# Patient Record
Sex: FEMALE | Race: WHITE | Employment: UNEMPLOYED | URBAN - METROPOLITAN AREA
[De-identification: names, ages, dates, MRNs, and addresses within clinical notes are randomized per-mention and may not be internally consistent; named-entity substitution may affect disease eponyms.]

---

## 2017-11-30 ENCOUNTER — OFFICE VISIT (OUTPATIENT)
Dept: FAMILY MEDICINE CLINIC | Age: 22
End: 2017-11-30

## 2017-11-30 VITALS
SYSTOLIC BLOOD PRESSURE: 128 MMHG | RESPIRATION RATE: 18 BRPM | TEMPERATURE: 98.4 F | HEIGHT: 64 IN | DIASTOLIC BLOOD PRESSURE: 84 MMHG | BODY MASS INDEX: 22.98 KG/M2 | OXYGEN SATURATION: 100 % | WEIGHT: 134.6 LBS | HEART RATE: 94 BPM

## 2017-11-30 DIAGNOSIS — F19.11 HISTORY OF SUBSTANCE ABUSE (HCC): ICD-10-CM

## 2017-11-30 DIAGNOSIS — N92.6 MISSED MENSES: Primary | ICD-10-CM

## 2017-11-30 DIAGNOSIS — R82.90 ABNORMAL URINALYSIS: ICD-10-CM

## 2017-11-30 PROBLEM — I48.91 ATRIAL FIBRILLATION (HCC): Status: ACTIVE | Noted: 2017-11-30

## 2017-11-30 LAB
BILIRUB UR QL STRIP: NEGATIVE
GLUCOSE UR-MCNC: NEGATIVE MG/DL
HCG URINE, QL. (POC): POSITIVE
KETONES P FAST UR STRIP-MCNC: NEGATIVE MG/DL
PH UR STRIP: 6.5 [PH] (ref 4.6–8)
PROT UR QL STRIP: NEGATIVE
SP GR UR STRIP: 1.02 (ref 1–1.03)
UA UROBILINOGEN AMB POC: NORMAL (ref 0.2–1)
URINALYSIS CLARITY POC: NORMAL
URINALYSIS COLOR POC: NORMAL
URINE BLOOD POC: NORMAL
URINE LEUKOCYTES POC: NORMAL
URINE NITRITES POC: POSITIVE
VALID INTERNAL CONTROL?: YES

## 2017-11-30 RX ORDER — CEPHALEXIN 500 MG/1
500 CAPSULE ORAL 2 TIMES DAILY
Qty: 14 CAP | Refills: 0 | Status: SHIPPED | OUTPATIENT
Start: 2017-11-30 | End: 2017-12-07

## 2017-11-30 RX ORDER — METOPROLOL SUCCINATE 25 MG/1
TABLET, EXTENDED RELEASE ORAL
Refills: 5 | COMMUNITY
Start: 2017-11-03 | End: 2017-12-19

## 2017-11-30 NOTE — PROGRESS NOTES
Subjective:   Wally Devine is a 20 y.o. female who is being seen today for possible pregnancy due to missed menses. 1. Missed menses  She had 2 positive UPT at home  LMP 10/21/2017. She reports periods regular,every 28-30 days, for 3-6 days. . First uncomplicated pregnancy, , full term. Has 2 year boy. She is sexually active with her boyfriend and is not using contraception. This is unplanned pregnancy. She did not start taking prenatal vitamin. 2. Atrial fibrillation  Diagnosed with atrial fibrillation couple of years ago. Unclear about etiology. On metoprolol 25 mg daily, did not take it for couple of days. Seeing Cardiologist at [address] [place] [address] Dr. Xuan Leach ( [phone])  No chest pain, no palpitations. 3. Extensive psychiatric history ( hx of depression with SI, PTSD, antisocial and borderline personality disorder). Was on multiple medications before. Saw the psychiatrist, last time saw him almost 1 year ago, all the medications were stopped. Reports that everything \" OK now\". Lives with her 3year old son and boyfriend. Good relationship. No stress, feels safe at home. No SI/HI. Social hx: She was smoking 1ppd for couple of years but stopped when found out that she is pregnant. Reports drinking 2-3 beers couple times a week, last drink 5 days ago. Denies illicit drug use and marijuana smoking    Allergies- reviewed:   No Known Allergies        Medications- reviewed:   Current Outpatient Prescriptions   Medication Sig    metoprolol succinate (TOPROL-XL) 25 mg XL tablet TAKE 1 TABLET BY ORAL ROUTE EVERY DAY    cephALEXin (KEFLEX) 500 mg capsule Take 1 Cap by mouth two (2) times a day for 7 days.  prenatal vit-calcium-iron-fa (PRENATAL PLUS WITH CALCIUM) 27 mg iron- 1 mg tab Take 1 Tab by mouth daily.  cloNIDine HCl (CATAPRES) 0.1 mg tablet Take  by mouth two (2) times a day.     gabapentin (NEURONTIN) 300 mg capsule Take 300 mg by mouth three (3) times daily.    gabapentin (NEURONTIN) 600 mg tablet Take  by mouth daily. Nightly    prazosin (MINIPRESS) 1 mg capsule Take  by mouth nightly.  imipramine (TOFRANIL) 10 mg tablet Take 10 mg by mouth nightly.  OLANZapine (ZYPREXA) 15 mg tablet Take 15 mg by mouth nightly.  norgestimate-ethinyl estradiol (SPRINTEC, 28,) 0.25-35 mg-mcg tab Take 1 Tab by mouth daily. No current facility-administered medications for this visit. Past Medical History- reviewed:  Past Medical History:   Diagnosis Date    [facility] ([facility] 11/30/2017    Borderline personality disorder     vs. traits thereof    Chronic tonsillitis     s/p tonsillectomy 2011    Conduct disorder     History of inadequate prenatal care 3/13/2014    Migraine     Mood disorder ([facility] 5/19/2015    Oppositional defiant disorder     Substance abuse     mj, alcohol, heroin         Past Surgical History- reviewed:   Past Surgical History:   Procedure Laterality Date    HX TONSILLECTOMY  2011             Immunizations- reviewed:   Immunization History   Administered Date(s) Administered    Tdap 02/21/2014     The pt reports having Influenza vaccine in 9/2017    ROS:  General: No fevers or chills  GI: No abdominal pain, nausea, vomiting  : No vaginal bleeding. No dysuria.       Objective:     Visit Vitals    /84 (BP 1 Location: Left arm, BP Patient Position: Sitting)    Pulse 94    Temp 98.4 °F (36.9 °C) (Oral)    Resp 18    Ht 5' 4\" (1.626 m)    Wt 134 lb 9.6 oz (61.1 kg)    LMP 10/21/2017    SpO2 100%    BMI 23.1 kg/m2       Physical Exam:  GENERAL APPEARANCE: alert, well appearing, in no apparent distress  HEAD: normocephalic, atraumatic  LUNGS: clear to auscultation, no wheezes, rales or rhonchi, symmetric air entry  HEART: regular rate and rhythm, no murmurs  ABDOMEN: FHT present  BACK: no CVA tenderness    Labs:  Urine pregnancy test   Recent Results (from the past 12 hour(s))   AMB POC URINALYSIS DIP STICK AUTO W/O MICRO    Collection Time: 11/30/17  8:24 AM   Result Value Ref Range    Color (UA POC) Dark Yellow     Clarity (UA POC) Cloudy     Glucose (UA POC) Negative Negative    Bilirubin (UA POC) Negative Negative    Ketones (UA POC) Negative Negative    Specific gravity (UA POC) 1.025 1.001 - 1.035    Blood (UA POC) Trace Negative    pH (UA POC) 6.5 4.6 - 8.0    Protein (UA POC) Negative Negative    Urobilinogen (UA POC) 0.2 mg/dL 0.2 - 1    Nitrites (UA POC) Positive Negative    Leukocyte esterase (UA POC) 2+ Negative   AMB POC URINE PREGNANCY TEST, VISUAL COLOR COMPARISON    Collection Time: 11/30/17  8:24 AM   Result Value Ref Range    VALID INTERNAL CONTROL POC Yes     HCG urine, Ql. (POC) Positive Negative         Assessment:   19 yo female who is here for missed menses. ICD-10-CM ICD-9-CM    1. Missed menses N92.6 626.4 AMB POC URINALYSIS DIP STICK AUTO W/O MICRO      AMB POC URINE PREGNANCY TEST, VISUAL COLOR COMPARISON      CULTURE, URINE      10-PANEL URINE DRUG SCREEN      cephALEXin (KEFLEX) 500 mg capsule      prenatal vit-calcium-iron-fa (PRENATAL PLUS WITH CALCIUM) 27 mg iron- 1 mg tab   2. History of substance abuse Z87.898 V13.89    3. Abnormal urinalysis R82.90 791.9        Pregnancy at 5 and 5/7 weeks by LMP. FARHAD: 7/28/2017      Plan:   1. Missed menses. UPT is positive. · Return to clinic in 1 week  for initial prenatal visit for discussion of the labs and treatment of A-fib ( as below)  · Given the extensive psychiatric hx and substance abuse getting the UDS. Will discuss in detail next visit. No active problems, no Si/HI at this point. Will monitor closely, might need involve . · The pt desires the genetic screening, request was sent to [facility]  · Prenatal vitamins ordered    2. Abnormal UA. UA with LE 2+and nitrites. Concerning for asymptomatic bacteruria during the pregnancy. Will empirically treat with Keflex. Urine cx ordered. 3. H/o substance abuse. Getting UDS today. 4.Atrial fibrillation. Unclear etiology given the young age. Suspect from hx of alcohol abuse (?). Advised the pt to hold on Metoprolol for now as contraindicated during the pregnancy. Will discuss with the cardiologist treatment options in the setting of pregnancy. Orders Placed This Encounter    CULTURE, URINE    10-PANEL URINE DRUG SCREEN    AMB POC URINALYSIS DIP STICK AUTO W/O MICRO    AMB POC URINE PREGNANCY TEST, VISUAL COLOR COMPARISON    metoprolol succinate (TOPROL-XL) 25 mg XL tablet     Sig: TAKE 1 TABLET BY ORAL ROUTE EVERY DAY     Refill:  5    cephALEXin (KEFLEX) 500 mg capsule     Sig: Take 1 Cap by mouth two (2) times a day for 7 days. Dispense:  14 Cap     Refill:  0    prenatal vit-calcium-iron-fa (PRENATAL PLUS WITH CALCIUM) 27 mg iron- 1 mg tab     Sig: Take 1 Tab by mouth daily. Dispense:  30 Tab     Refill:  11         I have discussed the diagnosis with the patient and the intended plan as seen in the above orders. The patient has received an after-visit summary and questions were answered concerning future plans. I have discussed medication side effects and warnings with the patient as well. Informed pt to return to the office or go to the ER if she experiences vaginal bleeding, vaginal discharge, leaking of fluid, pelvic cramping. The patient was discussed with Dr. Arvind Luevano ( the attending physician)    Freda Valadez MD  Family Medicine Resident, PGY-2    Addendum: I called the office for Dr. Nella Gonzalez ( the cardiologist) and left a message for the nurse to call back the clinic and discuss the patient. Will attempt to call next week.

## 2017-11-30 NOTE — PATIENT INSTRUCTIONS
Nutrition During Pregnancy: Care Instructions  Your Care Instructions    Healthy eating when you are pregnant is important for you and your baby. It can help you feel well and have a successful pregnancy and delivery. During pregnancy your nutrition needs increase. Even if you have excellent eating habits, your doctor may recommend a multivitamin to make sure you get enough iron and folic acid. Many pregnant women wonder how much weight they should gain. In general, women who were at a healthy weight before they became pregnant should gain between 25 and 35 pounds. Women who were overweight before pregnancy are usually advised to gain 15 to 25 pounds. Women who were underweight before pregnancy are usually advised to gain 28 to 40 pounds. Your doctor will work with you to set a weight goal that is right for you. Gaining a healthy amount of weight helps you have a healthy baby. Follow-up care is a key part of your treatment and safety. Be sure to make and go to all appointments, and call your doctor if you are having problems. It's also a good idea to know your test results and keep a list of the medicines you take. How can you care for yourself at home? · Eat plenty of fruits and vegetables. Include a variety of orange, yellow, and leafy dark-green vegetables every day. · Choose whole-grain bread, cereal, and pasta. Good choices include whole wheat bread, whole wheat pasta, brown rice, and oatmeal.  · Get 4 or more servings of milk and milk products each day. Good choices include nonfat or low-fat milk, yogurt, and cheese. If you cannot eat milk products, you can get calcium from calcium-fortified products such as orange juice, soy milk, and tofu. Other non-milk sources of calcium include leafy green vegetables, such as broccoli, kale, mustard greens, turnip greens, bok saurav, and brussels sprouts. · If you eat meat, pick lower-fat types.  Good choices include lean cuts of meat and chicken or turkey without the skin. · Do not eat shark, swordfish, alber mackerel, or tilefish. They have high levels of mercury, which is dangerous to your baby. You can eat up to 12 ounces a week of fish or shellfish that have low mercury levels. Good choices include shrimp, wild salmon, pollock, and catfish. Do not eat more than 6 ounces of tuna each week. · Heat lunch meats (such as turkey, ham, or bologna) to 165°F before you eat them. This reduces your risk of getting sick from a kind of bacteria that can be found in lunch meats. · Do not eat unpasteurized soft cheeses, such as brie, feta, fresh mozzarella, and blue cheese. They have a bacteria that could harm your baby. · Limit caffeine. If you drink coffee or tea, have no more than 1 cup a day. Caffeine is also found in chandra. · Do not drink any alcohol. No amount of alcohol has been found to be safe during pregnancy. · Do not diet or try to lose weight. For example, do not follow a low-carbohydrate diet. If you are overweight at the start of your pregnancy, your doctor will work with you to manage your weight gain. · Tell your doctor about all vitamins and supplements you take. When should you call for help? Watch closely for changes in your health, and be sure to contact your doctor if you have any problems. Where can you learn more? Go to http://luis-keron.info/. Enter Y785 in the search box to learn more about \"Nutrition During Pregnancy: Care Instructions. \"  Current as of: March 16, 2017  Content Version: 11.4  © 5470-5731 [employer]. Care instructions adapted under license by [employer] (which disclaims liability or warranty for this information). If you have questions about a medical condition or this instruction, always ask your healthcare professional. [address] any warranty or liability for your use of this information.

## 2017-11-30 NOTE — PROGRESS NOTES
Chief Complaint   Patient presents with    Pregnancy Test    Missed Menses    Labs     1. Have you been to the ER, urgent care clinic since your last visit? Hospitalized since your last visit? No    2. Have you seen or consulted any other health care providers outside of the [address] [place] [address] since your last visit? Include any pap smears or colon screening.  No

## 2017-11-30 NOTE — MR AVS SNAPSHOT
Visit Information Date & Time Provider Department Dept. Phone Encounter #  
 11/30/2017  8:30 AM uLc Carvajal MD [address] [facility] [phone] [id number] Your Appointments 12/21/2017  9:15 AM  
NEW OB Patient with Weston Haro MD  
[address]) Appt Note: Initial Prenatal Visit [address]  
[phone]  
  
   
 [address]  
  
    
 12/26/2017  1:00 PM  
PROCEDURE with Katt Mcneill MD  
[address]) Appt Note: Dating Ultrasound [address]  
[phone]  
  
   
 [address] [facility] [phone] Upcoming Health Maintenance Date Due  
 HPV AGE 9Y-34Y (1 of 3 - Female 3 Dose Series) 11/28/2005 Pneumococcal 19-64 Medium Risk (1 of 1 - PPSV23) 11/28/2013 PAP AKA CERVICAL CYTOLOGY 11/28/2015 DTaP/Tdap/Td series (2 - Td) 2/21/2024 Allergies as of 11/30/2017  Review Complete On: 11/30/2017 By: Magdy Long LPN No Known Allergies Current Immunizations  Reviewed on 2/20/2014 Name Date Influenza Vaccine PF 2/21/2014  2:32 PM  
 Tdap 2/21/2014  2:30 PM  
  
 Not reviewed this visit You Were Diagnosed With   
  
 Codes Comments Missed menses    -  Primary ICD-10-CM: N92.6 ICD-9-CM: 626.4 History of substance abuse     ICD-10-CM: Z87.898 ICD-9-CM: V13.89 Vitals BP Pulse Temp Resp Height(growth percentile) Weight(growth percentile) 128/84 (BP 1 Location: Left arm, BP Patient Position: Sitting) 94 98.4 °F (36.9 °C) (Oral) 18 5' 4\" (1.626 m) 134 lb 9.6 oz (61.1 kg) LMP SpO2 BMI OB Status Smoking Status 10/21/2017 100% 23.1 kg/m2 Having regular periods Current Every Day Smoker Vitals History BMI and BSA Data Body Mass Index Body Surface Area  
 23.1 kg/m 2 1.66 m 2 Preferred Pharmacy Pharmacy Name Phone [id number] [address] AT [facility] [phone] Your Updated Medication List  
  
   
This list is accurate as of: 11/30/17  9:30 AM.  Always use your most recent med list.  
  
  
  
  
 cephALEXin 500 mg capsule Commonly known as:  Gwendlyn Campi Take 1 Cap by mouth two (2) times a day for 7 days. cloNIDine HCl 0.1 mg tablet Commonly known as:  CATAPRES Take  by mouth two (2) times a day. * gabapentin 300 mg capsule Commonly known as:  NEURONTIN Take 300 mg by mouth three (3) times daily. * gabapentin 600 mg tablet Commonly known as:  NEURONTIN Take  by mouth daily. Nightly  
  
 imipramine 10 mg tablet Commonly known as:  TOFRANIL Take 10 mg by mouth nightly. metoprolol succinate 25 mg XL tablet Commonly known as:  TOPROL-XL  
TAKE 1 TABLET BY ORAL ROUTE EVERY DAY  
  
 norgestimate-ethinyl estradiol 0.25-35 mg-mcg Tab Commonly known as:  [facility] (28) Take 1 Tab by mouth daily. OLANZapine 15 mg tablet Commonly known as:  ZyPREXA Take 15 mg by mouth nightly. prazosin 1 mg capsule Commonly known as:  MINIPRESS Take  by mouth nightly. prenatal vit-calcium-iron-fa 27 mg iron- 1 mg Tab Commonly known as:  PRENATAL PLUS with CALCIUM Take 1 Tab by mouth daily. * Notice: This list has 2 medication(s) that are the same as other medications prescribed for you. Read the directions carefully, and ask your doctor or other care provider to review them with you. Prescriptions Sent to Pharmacy Refills  
 cephALEXin (KEFLEX) 500 mg capsule 0 Sig: Take 1 Cap by mouth two (2) times a day for 7 days. Class: Normal  
 Pharmacy: [address] Ph #: [phone] Route: Oral  
 prenatal vit-calcium-iron-fa (PRENATAL PLUS WITH CALCIUM) 27 mg iron- 1 mg tab 11 Sig: Take 1 Tab by mouth daily. Class: Normal  
 Pharmacy: [address] #: [phone] Route: Oral  
  
We Performed the Following 10-PANEL URINE DRUG SCREEN [[id number] Custom] AMB POC URINALYSIS DIP STICK AUTO W/O MICRO [94597 CPT(R)] AMB POC URINE PREGNANCY TEST, VISUAL COLOR COMPARISON [25123 CPT(R)] CULTURE, URINE [id number] CPT(R)] Patient Instructions Nutrition During Pregnancy: Care Instructions Your Care Instructions Healthy eating when you are pregnant is important for you and your baby. It can help you feel well and have a successful pregnancy and delivery. During pregnancy your nutrition needs increase. Even if you have excellent eating habits, your doctor may recommend a multivitamin to make sure you get enough iron and folic acid. Many pregnant women wonder how much weight they should gain. In general, women who were at a healthy weight before they became pregnant should gain between 25 and 35 pounds. Women who were overweight before pregnancy are usually advised to gain 15 to 25 pounds. Women who were underweight before pregnancy are usually advised to gain 28 to 40 pounds. Your doctor will work with you to set a weight goal that is right for you. Gaining a healthy amount of weight helps you have a healthy baby. Follow-up care is a key part of your treatment and safety. Be sure to make and go to all appointments, and call your doctor if you are having problems. It's also a good idea to know your test results and keep a list of the medicines you take. How can you care for yourself at home? · Eat plenty of fruits and vegetables. Include a variety of orange, yellow, and leafy dark-green vegetables every day. · Choose whole-grain bread, cereal, and pasta. Good choices include whole wheat bread, whole wheat pasta, brown rice, and oatmeal. 
· Get 4 or more servings of milk and milk products each day.  Good choices include nonfat or low-fat milk, yogurt, and cheese. If you cannot eat milk products, you can get calcium from calcium-fortified products such as orange juice, soy milk, and tofu. Other non-milk sources of calcium include leafy green vegetables, such as broccoli, kale, mustard greens, turnip greens, bok saurav, and brussels sprouts. · If you eat meat, pick lower-fat types. Good choices include lean cuts of meat and chicken or turkey without the skin. · Do not eat shark, swordfish, alber mackerel, or tilefish. They have high levels of mercury, which is dangerous to your baby. You can eat up to 12 ounces a week of fish or shellfish that have low mercury levels. Good choices include shrimp, wild salmon, pollock, and catfish. Do not eat more than 6 ounces of tuna each week. · Heat lunch meats (such as turkey, ham, or bologna) to 165°F before you eat them. This reduces your risk of getting sick from a kind of bacteria that can be found in lunch meats. · Do not eat unpasteurized soft cheeses, such as brie, feta, fresh mozzarella, and blue cheese. They have a bacteria that could harm your baby. · Limit caffeine. If you drink coffee or tea, have no more than 1 cup a day. Caffeine is also found in chandra. · Do not drink any alcohol. No amount of alcohol has been found to be safe during pregnancy. · Do not diet or try to lose weight. For example, do not follow a low-carbohydrate diet. If you are overweight at the start of your pregnancy, your doctor will work with you to manage your weight gain. · Tell your doctor about all vitamins and supplements you take. When should you call for help? Watch closely for changes in your health, and be sure to contact your doctor if you have any problems. Where can you learn more? Go to http://luis-keron.info/. Enter Y785 in the search box to learn more about \"Nutrition During Pregnancy: Care Instructions. \" Current as of: March 16, 2017 Content Version: 11.4 © 7554-5362 [employer]wise[employer]. Care instructions adapted under license by [employer] (which disclaims liability or warranty for this information). If you have questions about a medical condition or this instruction, always ask your healthcare professional. [address] any warranty or liability for your use of this information. Introducing [facility]! Dear Atha Oppenheim: 
Thank you for requesting a Popcuts account. Our records indicate that you already have an active Popcuts account. You can access your account anytime at [email or web address] Did you know that you can access your hospital and ER discharge instructions at any time in Popcuts? You can also review all of your test results from your hospital stay or ER visit. Additional Information If you have questions, please visit the Frequently Asked Questions section of the Popcuts website at [email or web address] Remember, Popcuts is NOT to be used for urgent needs. For medical emergencies, dial 911. Now available from your iPhone and Android! Please provide this summary of care documentation to your next provider. Your primary care clinician is listed as Denia Becerra. If you have any questions after today's visit, please call [phone].

## 2017-12-01 LAB
AMPHETAMINES UR QL SCN: NEGATIVE NG/ML
BARBITURATES UR QL SCN: NEGATIVE NG/ML
BENZODIAZ UR QL: NEGATIVE NG/ML
BZE UR QL: NEGATIVE NG/ML
CANNABINOIDS UR QL SCN: NEGATIVE NG/ML
MDMA UR QL SCN: NEGATIVE NG/ML
METHADONE UR QL SCN: NEGATIVE NG/ML
METHAQUALONE UR QL: NEGATIVE NG/ML
OPIATES UR QL: NEGATIVE NG/ML
PCP UR QL: NEGATIVE NG/ML
PROPOXYPH UR QL: NEGATIVE NG/ML

## 2017-12-01 NOTE — PROGRESS NOTES
I reviewed with the resident the medical history and the resident's findings on the physical examination. I discussed with the resident the patient's diagnosis and concur with the plan.     Close follow up with patient  Resident to contact cardiologist for further management of afib during pregnancy

## 2017-12-02 LAB — BACTERIA UR CULT: ABNORMAL

## 2017-12-04 ENCOUNTER — TELEPHONE (OUTPATIENT)
Dept: FAMILY MEDICINE CLINIC | Age: 22
End: 2017-12-04

## 2017-12-04 NOTE — PROGRESS NOTES
Urine cx with E.Coli polysuscetible, the pt is already on Keflex. UDS is negative. The pt is pregnant. Will discuss next visit.

## 2017-12-04 NOTE — TELEPHONE ENCOUNTER
Kristy of Dr. Xuan Leach office returned a call to the office. Cardiosvascular.     Physician took the forrest.    Ph [phone]

## 2017-12-04 NOTE — TELEPHONE ENCOUNTER
Received the call back from the nurse Kristy for Dr. Nella Gonzalez ( the cardiologist). Per Dr. Nella Gonzalez, the pt was on Metoprolol for palpitations ( no official dx of A-fib). Per him the pt can be safely took off the medication ( Metoprolol).   Will notify the pt.    1:24 PM  12/4/2017  Freda Valadez MD

## 2017-12-08 ENCOUNTER — INITIAL PRENATAL (OUTPATIENT)
Dept: FAMILY MEDICINE CLINIC | Age: 22
End: 2017-12-08

## 2017-12-08 ENCOUNTER — HOSPITAL ENCOUNTER (OUTPATIENT)
Dept: LAB | Age: 22
Discharge: HOME | End: 2017-12-08
Payer: COMMERCIAL

## 2017-12-08 VITALS
HEIGHT: 64 IN | RESPIRATION RATE: 16 BRPM | BODY MASS INDEX: 23.73 KG/M2 | TEMPERATURE: 98.5 F | WEIGHT: 139 LBS | HEART RATE: 79 BPM | DIASTOLIC BLOOD PRESSURE: 75 MMHG | OXYGEN SATURATION: 98 % | SYSTOLIC BLOOD PRESSURE: 115 MMHG

## 2017-12-08 DIAGNOSIS — Z34.91 PRENATAL CARE IN FIRST TRIMESTER: Primary | ICD-10-CM

## 2017-12-08 DIAGNOSIS — O99.891 BACTERIURIA DURING PREGNANCY: ICD-10-CM

## 2017-12-08 DIAGNOSIS — Z34.91 PRENATAL CARE IN FIRST TRIMESTER: ICD-10-CM

## 2017-12-08 DIAGNOSIS — R82.71 BACTERIURIA DURING PREGNANCY: ICD-10-CM

## 2017-12-08 LAB
BILIRUB UR QL STRIP: NEGATIVE
GLUCOSE UR-MCNC: NORMAL MG/DL
KETONES P FAST UR STRIP-MCNC: NEGATIVE MG/DL
PH UR STRIP: 7 [PH] (ref 4.6–8)
PROT UR QL STRIP: NEGATIVE
SP GR UR STRIP: 1.02 (ref 1–1.03)
UA UROBILINOGEN AMB POC: NORMAL (ref 0.2–1)
URINALYSIS CLARITY POC: NORMAL
URINALYSIS COLOR POC: YELLOW
URINE BLOOD POC: NEGATIVE
URINE LEUKOCYTES POC: NEGATIVE
URINE NITRITES POC: NEGATIVE

## 2017-12-08 PROCEDURE — 88175 CYTOPATH C/V AUTO FLUID REDO: CPT | Performed by: FAMILY MEDICINE
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
## 2017-12-08 NOTE — PROGRESS NOTES
Chief Complaint   Patient presents with    Routine Prenatal Visit       1. Have you been to the ER, urgent care clinic since your last visit? Hospitalized since your last visit? No    2. Have you seen or consulted any other health care providers outside of the [address] [place] [address] since your last visit? Include any pap smears or colon screening.  No

## 2017-12-08 NOTE — PATIENT INSTRUCTIONS
Weeks 6 to 10 of Your Pregnancy: Care Instructions  Your Care Instructions    Congratulations on your pregnancy. This is an exciting and important time for you. During the first 6 to 10 weeks of your pregnancy, your body goes through many changes. Your baby grows very fast, even though you cannot feel it yet. You may start to notice that you feel different, both in your body and your emotions. Because each woman's pregnancy is unique, there is no right way to feel. You may feel the healthiest you have ever been, or you may feel tired or sick to your stomach (\"morning sickness\"). These early weeks are a time to make healthy choices and to eat the best foods for you and your baby. This care sheet will give you some ideas. This is also a good time to think about birth defects testing. These are tests done during pregnancy to look for possible problems with the baby. First trimester tests for birth defects can be done between 8 and 17 weeks of pregnancy, depending on the test. Talk with your doctor about what kinds of tests are available. Follow-up care is a key part of your treatment and safety. Be sure to make and go to all appointments, and call your doctor if you are having problems. It's also a good idea to know your test results and keep a list of the medicines you take. How can you care for yourself at home? Eat well  · Eat at least 3 meals and 2 healthy snacks every day. Eat fresh, whole foods, including:  ¨ 7 or more servings of bread, tortillas, cereal, rice, pasta, or oatmeal.  ¨ 3 or more servings of vegetables, especially leafy green vegetables. ¨ 2 or more servings of fruits. ¨ 3 or more servings of milk, yogurt, or cheese. ¨ 2 or more servings of meat, turkey, chicken, fish, eggs, or dried beans. · Drink plenty of fluids, especially water. Avoid sodas and other sweetened drinks. · Choose foods that have important vitamins for your baby, such as calcium, iron, and folate.   ¨ Dairy products, tofu, canned fish with bones, almonds, broccoli, dark leafy greens, corn tortillas, and fortified orange juice are good sources of calcium. ¨ Beef, poultry, liver, spinach, lentils, dried beans, fortified cereals, and dried fruits are rich in iron. ¨ Dark leafy greens, broccoli, asparagus, liver, fortified cereals, orange juice, peanuts, and almonds are good sources of folate. · Avoid foods that could harm your baby. ¨ Do not eat raw or undercooked meat, chicken, or fish (such as sushi or raw oysters). ¨ Do not eat raw eggs or foods that contain raw eggs, such as Caesar dressing. ¨ Do not eat soft cheeses and unpasteurized dairy foods, such as Brie, feta, or blue cheese. ¨ Do not eat fish that contains a lot of mercury, such as shark, swordfish, tilefish, or alber mackerel. Do not eat more than 6 ounces of tuna each week. ¨ Do not eat raw sprouts, especially alfalfa sprouts. ¨ Cut down on caffeine, such as coffee, tea, and cola. Protect yourself and your baby  · Do not touch abi litter or cat feces. They can cause an infection that could harm your baby. · High body temperature can be harmful to your baby. So if you want to use a sauna or hot tub, be sure to talk to your doctor about how to use it safely. [place] with morning sickness  · Sip small amounts of water, juices, or shakes. Try drinking between meals, not with meals. · Eat 5 or 6 small meals a day. Try dry toast or crackers when you first get up, and eat breakfast a little later. · Avoid spicy, greasy, and fatty foods. · When you feel sick, open your windows or go for a short walk to get fresh air. · Try nausea wristbands. These help some women. · Tell your doctor if you think your prenatal vitamins make you sick. Where can you learn more? Go to http://dio.info/. Enter G112 in the search box to learn more about \"Weeks 6 to 10 of Your Pregnancy: Care Instructions. \"  Current as of: March 16, 2017  Content Version: 11.4  © 4965-7795 [employer]. Care instructions adapted under license by [employer] (which disclaims liability or warranty for this information). If you have questions about a medical condition or this instruction, always ask your healthcare professional. [address] any warranty or liability for your use of this information.

## 2017-12-12 ENCOUNTER — TELEPHONE (OUTPATIENT)
Dept: FAMILY MEDICINE CLINIC | Age: 22
End: 2017-12-12

## 2017-12-12 DIAGNOSIS — A74.9 CHLAMYDIA INFECTION AFFECTING PREGNANCY IN FIRST TRIMESTER: Primary | ICD-10-CM

## 2017-12-12 DIAGNOSIS — O98.811 CHLAMYDIA INFECTION AFFECTING PREGNANCY IN FIRST TRIMESTER: Primary | ICD-10-CM

## 2017-12-12 PROBLEM — O98.819 CHLAMYDIA INFECTION AFFECTING PREGNANCY: Status: ACTIVE | Noted: 2017-12-12

## 2017-12-12 LAB
C TRACH RRNA SPEC QL NAA+PROBE: POSITIVE
N GONORRHOEA RRNA SPEC QL NAA+PROBE: NEGATIVE

## 2017-12-12 RX ORDER — AZITHROMYCIN 500 MG/1
1000 TABLET, FILM COATED ORAL ONCE
Qty: 2 TAB | Refills: 0 | Status: SHIPPED | OUTPATIENT
Start: 2017-12-12 | End: 2017-12-12

## 2017-12-12 NOTE — TELEPHONE ENCOUNTER
I called the patient at [phone] to discuss the test results. The patient was identified by 2 identifiers. Discussed results. Informed about Chlamydia infection which has to be treated. Advised to ask partner to be tested and treated, advised against sexual intercourse for now. -Script for Azithromycin 1g was sent to the pharmacy.  Will need  BHAVIK in 4 weeks after treatment and at 35-37 weeks of pregnancy     3:12 PM  12/12/2017  Johanne Mcconnell MD

## 2017-12-14 ENCOUNTER — TELEPHONE (OUTPATIENT)
Dept: FAMILY MEDICINE CLINIC | Age: 22
End: 2017-12-14

## 2017-12-14 DIAGNOSIS — O21.0 MORNING SICKNESS: Primary | ICD-10-CM

## 2017-12-14 RX ORDER — LANOLIN ALCOHOL/MO/W.PET/CERES
50 CREAM (GRAM) TOPICAL
Qty: 90 TAB | Refills: 1 | Status: SHIPPED | OUTPATIENT
Start: 2017-12-14 | End: 2018-08-10

## 2017-12-14 NOTE — TELEPHONE ENCOUNTER
Notified patient per Dr. Pepe Quinonez prescription for vitam B6 and Unisom have been sent to pharmacy. Patient verbalized understanding.

## 2017-12-14 NOTE — TELEPHONE ENCOUNTER
Spoke with patient who stated she is 8 weeks pregnant. Patient states she is having a lot of nausea which started Tuesday. Patient states she has been drinking ginger ale with crackers to help settle stomach. Advised patient to continue with drinking the ginger ale to help settle stomach. Patient is requesting a prescription to help with nausea. Advised patient a message will be sent to doctor with request. Patient verbalized understanding.

## 2017-12-14 NOTE — TELEPHONE ENCOUNTER
Patient called to say she is nauseated all the time. Nurse aware of call. While on hold, there was a phone disconnect.

## 2017-12-19 ENCOUNTER — OFFICE VISIT (OUTPATIENT)
Dept: FAMILY MEDICINE CLINIC | Age: 22
End: 2017-12-19

## 2017-12-19 VITALS
HEIGHT: 64 IN | HEART RATE: 97 BPM | SYSTOLIC BLOOD PRESSURE: 118 MMHG | OXYGEN SATURATION: 96 % | DIASTOLIC BLOOD PRESSURE: 81 MMHG | RESPIRATION RATE: 16 BRPM | BODY MASS INDEX: 23.15 KG/M2 | WEIGHT: 135.6 LBS | TEMPERATURE: 97.9 F

## 2017-12-19 DIAGNOSIS — Z34.90 ENCOUNTER FOR SUPERVISION OF NORMAL PREGNANCY, ANTEPARTUM, UNSPECIFIED GRAVIDITY: Primary | ICD-10-CM

## 2017-12-19 LAB
BILIRUB UR QL STRIP: NEGATIVE
GLUCOSE UR-MCNC: NEGATIVE MG/DL
KETONES P FAST UR STRIP-MCNC: NEGATIVE MG/DL
PH UR STRIP: 7.5 [PH] (ref 4.6–8)
PROT UR QL STRIP: NEGATIVE
SP GR UR STRIP: 1.01 (ref 1–1.03)
UA UROBILINOGEN AMB POC: NORMAL (ref 0.2–1)
URINALYSIS CLARITY POC: NORMAL
URINALYSIS COLOR POC: YELLOW
URINE BLOOD POC: NEGATIVE
URINE LEUKOCYTES POC: NORMAL
URINE NITRITES POC: NEGATIVE

## 2017-12-19 NOTE — PROGRESS NOTES
I reviewed with the resident the medical history and the resident's findings on the physical examination. I discussed with the resident the patient's diagnosis and concur with the plan.  @ 6.6  1.  IUP: IOB labs next visit, MFM referral as she desires first trimester screening  2. Hx polysubstance abuse: UDS negative, would repeat when she presents on L&D for labor  3. Hx UTI: treated, needs BHAVIK  4.   Hx psych disorders: will need close follow up after delivery for PP depression screening

## 2017-12-19 NOTE — PROGRESS NOTES
Dating Ultrasound Procedure Report    Francie Clifton is a 20 y.o.  at [address] by LMP here for dating ultrasound. Role of ultrasound in pregnancy discussed with patient. Patient consents to undergo dating ultrasound. Currently taking PNV and Vit B6 for nausea. Currently have no other concerns. Patient recently completed 1 week course of Azithromycin 1g for Chlamydia infection. [facility]  OFFICE PROCEDURE PROGRESS NOTE        Chart reviewed for the following:   I, Leopoldo Burn, MD, have reviewed the History, Physical and updated the Allergic reactions for [address] performed immediately prior to start of procedure:   I, Leopoldo Burn, MD, have performed the following reviews on Francie Clifton prior to the start of the procedure:            * Patient was identified by name and date of birth   * Agreement on procedure being performed was verified  * Risks and Benefits explained to the patient  * Procedure site verified and marked as necessary  * Patient was positioned for comfort  * Consent was signed and verified     Time: 1:25 PM      Date of procedure: 2017    Procedure performed by: Leopoldo Burn, MD    Provider assisted by: Dr. Donnamarie Cogan (supervising provider)    Patient assisted by: self    How tolerated by patient: tolerated the procedure well with no complications    Comments: Fetal poles not clear visualized      Ultrasound performed at bedside for evaluation of pregnancy. Ultrasound Type: Transabdominal  Findings: Intrauterine pregnancy but unable to estimate gestational age because fetal poles not clearly visualized. Normal uterus, other maternal structures not visualized. Estimated Gestational Age by Ultrasound: Not measured  Fetal Position: n/a  Amniotic Fluid: n/a,   Placenta: n/a   Plan:    1. IUP: Unable to estimate gestational age via Transabdominal US in clinic.  Appointment on 2017 at 10 AM at [address] for a Transvaginal ultrasound. 2. Routine prenatal care as clinically indicated with Dr. Ruddy Madera in 4 weeks. 3. Hx of Chlamydia: Treated with 1g Azithromycin. Test of cure in 3-4 weeks. Will be checked at her next Ob visit with Dr. Ruddy Madera  4. History of UTI: Treated with Keflex. Will need BHAVIK  5. Hx of polysubstance abuse: UDS negative at last visit. Will need repeat when she presents at L&D for labor. 6. Hx of multiple Psych disorders (PTSD, Depression, mood disorder, Borderline personality disorder): Will need close follow up after delivery for postpartum depression screening. 7. Hx of Afib: Currently asymptomatic and not taking any medication. Was previously on metoprolol. Will need close monitory and need to be addressed at subsequent prenatal visits. Dr. Scar Stephenson (attending provider) present for entire procedure.   Maulik Cervantes MD; Family Medicine Resident

## 2017-12-19 NOTE — PROGRESS NOTES
Chief Complaint   Patient presents with    Ultrasound     dating ultrasound     1. Have you been to the ER, urgent care clinic since your last visit? Hospitalized since your last visit? No    2. Have you seen or consulted any other health care providers outside of the [address] [place] [address] since your last visit? Include any pap smears or colon screening. No     Patient denies any bleeding, cramping or leakage of fluid. Patient scheduled on 12/22/17 at 10:00am for transvaginal ultrasound. Patient has been notified of appointment.

## 2017-12-22 ENCOUNTER — HOSPITAL ENCOUNTER (OUTPATIENT)
Dept: ULTRASOUND IMAGING | Age: 22
Discharge: HOME | End: 2017-12-22
Attending: STUDENT IN AN ORGANIZED HEALTH CARE EDUCATION/TRAINING PROGRAM
Payer: COMMERCIAL

## 2017-12-22 DIAGNOSIS — Z34.90 ENCOUNTER FOR SUPERVISION OF NORMAL PREGNANCY, ANTEPARTUM, UNSPECIFIED GRAVIDITY: ICD-10-CM

## 2017-12-22 PROCEDURE — 76801 OB US < 14 WKS SINGLE FETUS: CPT

## 2017-12-22 PROCEDURE — 76817 TRANSVAGINAL US OBSTETRIC: CPT

## 2017-12-27 ENCOUNTER — ED HISTORICAL/CONVERTED ENCOUNTER (OUTPATIENT)
Dept: OTHER | Age: 22
End: 2017-12-27

## 2017-12-28 NOTE — PROGRESS NOTES
TVUS shows Single viable intrauterine pregnancy with estimated gestational age of 9 weeks 4 days. Normal ovaries. Patient will follow up with Dr. Cyndi Monge.

## 2017-12-28 NOTE — PROGRESS NOTES
I saw and evaluated the patient, performing the key elements of the service. I discussed the findings, assessment and plan with the resident and agree with the resident's findings and plan as documented in the resident's note. Attempted transabdominal ultrasound for verification of dates. Since we do not have transvaginal capabilities will refer to hospital for TVUS for confirmation. I personally reviewed the transvaginal results with 7w4d gestation on 12/22/18. EDC updated. Cont routine prenatal care.

## 2018-01-15 ENCOUNTER — TELEPHONE (OUTPATIENT)
Dept: FAMILY MEDICINE CLINIC | Age: 23
End: 2018-01-15

## 2018-01-15 NOTE — TELEPHONE ENCOUNTER
I called and rescheduled appt for patients. I took care of her ultra sound appt as well as her routine prenatal appt.

## 2018-01-15 NOTE — TELEPHONE ENCOUNTER
Patient called and canceled both of these appointments due to work schedule. Informed the nurse would be informed to call her. Hakan Kruse was not available. Tuesday, January 16, 2018 03:00 PM    [facility] OFFICE, ESHETUT_RES_SFFP, Procedure, 30min    Dating ultrasound.     Friday, January 19, 2018 10:45 AM   [facility] OFFICE, TREASURE_RES_SFFP, Prenatal Visit

## 2018-01-16 ENCOUNTER — OFFICE VISIT (OUTPATIENT)
Dept: FAMILY MEDICINE CLINIC | Age: 23
End: 2018-01-16

## 2018-01-16 VITALS
WEIGHT: 141 LBS | DIASTOLIC BLOOD PRESSURE: 70 MMHG | SYSTOLIC BLOOD PRESSURE: 119 MMHG | TEMPERATURE: 96.8 F | HEIGHT: 64 IN | BODY MASS INDEX: 24.07 KG/M2 | OXYGEN SATURATION: 99 % | HEART RATE: 93 BPM | RESPIRATION RATE: 16 BRPM

## 2018-01-16 DIAGNOSIS — Z34.90 ENCOUNTER FOR SUPERVISION OF NORMAL PREGNANCY, ANTEPARTUM, UNSPECIFIED GRAVIDITY: Primary | ICD-10-CM

## 2018-01-16 LAB
BILIRUB UR QL STRIP: NEGATIVE
GLUCOSE UR-MCNC: NEGATIVE MG/DL
KETONES P FAST UR STRIP-MCNC: NEGATIVE MG/DL
PH UR STRIP: 7.5 [PH] (ref 4.6–8)
PROT UR QL STRIP: NEGATIVE
SP GR UR STRIP: 1.01 (ref 1–1.03)
UA UROBILINOGEN AMB POC: NORMAL (ref 0.2–1)
URINALYSIS CLARITY POC: CLEAR
URINALYSIS COLOR POC: YELLOW
URINE BLOOD POC: NEGATIVE
URINE LEUKOCYTES POC: NORMAL
URINE NITRITES POC: NEGATIVE

## 2018-01-16 NOTE — PROGRESS NOTES
Dating Ultrasound Procedure Report    Leigha Lerma is a 20 y.o. Chalmer Shana with pregnancy at 11w1d by LMP and confirmed with TVUS on 12/22/17 here for Ultrasound. Patient wasn't sure why she needed to come and repeat U/S. Fetal doppler U/S was done initially with FHR of 90s and decision was made to undergo ultrasound for second look as she also did not get a picture in her previous TVUS visit. [facility]  OFFICE PROCEDURE PROGRESS NOTE      Chart reviewed for the following:   Antoni DAMON MD, have reviewed the History, Physical and updated the Allergic reactions for [address] Em[address] Field[address] "Cecilia" [address] performed immediately prior to start of procedure:   Antoni DAMON MD, have performed the following reviews on Leigha Lerma prior to the start of the procedure:            * Patient was identified by name and date of birth   * Agreement on procedure being performed was verified  * Risks and Benefits explained to the patient  * Procedure site verified and marked as necessary  * Patient was positioned for comfort  * Consent was signed and verified     Time: 3:37 PM    Date of procedure: 1/16/2018    Procedure performed by:  Antoni Segura MD    Provider assisted by: Dr. Arias Guadarrama    Patient assisted by: self    How tolerated by patient: tolerated the procedure well with no complications    Procedure in detail:    Ultrasound performed at bedside for evaluation of pregnancy. Ultrasound Type: Transabdominal  Findings: Single viable intrauterine pregnancy with EGA of 11w1d confirmed by previous TV U/S done on 12/22/2017  Positive fetal movement, fetal heart beat present, normal appearing amiotic fluid, normal uterus, other maternal structures not visualized. FARHAD 8/6/2018    Estimated Gestational Age by Ultrasound: 11w weeks 1day     Plan:    1.  Single viable intrauterine pregnancy with EGA of 11w1d confirmed by previous TV U/S done on 12/22/2017 and FARHAD of 8/6/2018  - Today's visit showed Positive fetal movement, fetal heart beat present, normal appearing amiotic fluid, normal uterus, other maternal structures not visualize  2. Routine prenatal care  -: Pap with G/C done in the previous initial OB visit s/p Zithromax. Urine Test of cure sent today and added other initial Ob visit labs  - Continue taking PNVs  Orders Placed This Encounter    CULTURE, URINE    CBC WITH AUTOMATED DIFF    RUBELLA AB, IGG    VZV AB, IGG    HEP B SURFACE AG    T PALLIDUM SCREEN W/REFLEX    HIV 1/2 AG/AB, 4TH GENERATION,W RFLX CONFIRM    CHLAMYDIA/GC PCR     Standing Status:   Future     Standing Expiration Date:   1/17/2019     Order Specific Question:   Sample source     Answer:   Urine [258]     Order Specific Question:   Specimen source     Answer:   Urine [258]    AMB POC URINALYSIS DIP STICK AUTO W/O MICRO    ANTIBODY SCREEN     3. Polysubstance abuse: UDS negative on 12/1/17  - Will recheck again upon presentation for delivery    4. Hx of PTSD, Depression, mood disorder, borderline personality disorder:   - Not currently on any medications  - Close follow up in prenatal and  post partum course      Dr. Sandy Alvares (attending) present for entire procedure.   Bethany Michael MD; Family Medicine Resident

## 2018-01-16 NOTE — MR AVS SNAPSHOT
[address] 
[phone] Patient: Pratik Washington MRN: [id number] :1995 Visit Information Date & Time Provider Department Dept. Phone Encounter #  
 2018  3:00 PM Bernardo Leyva MD [address] [phone] [id number] Follow-up Instructions Return in about 4 weeks (around 2018). Upcoming Health Maintenance Date Due  
 HPV AGE 9Y-34Y (1 of 3 - Female 3 Dose Series) 2005 PAP AKA CERVICAL CYTOLOGY 2020 DTaP/Tdap/Td series (2 - Td) 2024 Allergies as of 2018  Review Complete On: 2018 By: Aydee Farias LPN No Known Allergies Current Immunizations  Reviewed on 2017 Name Date Influenza Vaccine PF 2014  2:32 PM  
 Tdap 2014  2:30 PM  
  
 Not reviewed this visit You Were Diagnosed With   
  
 Codes Comments Encounter for supervision of normal pregnancy, antepartum, unspecified     -  Primary ICD-10-CM: Z34.90 ICD-9-CM: V22.1 Vitals BP Pulse Temp Resp Height(growth percentile) Weight(growth percentile) 119/70 93 96.8 °F (36 °C) (Oral) 16 5' 4\" (1.626 m) 141 lb (64 kg) LMP SpO2 BMI OB Status Smoking Status 10/21/2017 (Exact Date) 99% 24.2 kg/m2 Pregnant Former Smoker Vitals History BMI and BSA Data Body Mass Index Body Surface Area  
 24.2 kg/m 2 1.7 m 2 Preferred Pharmacy Pharmacy Name Phone [id number] [address] [facility] [phone] Your Updated Medication List  
  
   
This list is accurate as of: 18  5:13 PM.  Always use your most recent med list.  
  
  
  
  
 cloNIDine HCl 0.1 mg tablet Commonly known as:  CATAPRES Take  by mouth two (2) times a day. doxylamine succinate 25 mg tablet Commonly known as:  Raad Rodes Take 1 Tab by mouth nightly as needed for Sleep. * gabapentin 300 mg capsule Commonly known as:  NEURONTIN Take 300 mg by mouth three (3) times daily. * gabapentin 600 mg tablet Commonly known as:  NEURONTIN Take  by mouth daily. Nightly  
  
 imipramine 10 mg tablet Commonly known as:  TOFRANIL Take 10 mg by mouth nightly. OLANZapine 15 mg tablet Commonly known as:  ZyPREXA Take 15 mg by mouth nightly. prenatal vit-calcium-iron-fa 27 mg iron- 1 mg Tab Commonly known as:  PRENATAL PLUS with CALCIUM Take 1 Tab by mouth daily. pyridoxine (vitamin B6) 50 mg tablet Commonly known as:  VITAMIN B-6 Take 1 Tab by mouth three (3) times daily as needed. * Notice: This list has 2 medication(s) that are the same as other medications prescribed for you. Read the directions carefully, and ask your doctor or other care provider to review them with you. We Performed the Following AMB POC URINALYSIS DIP STICK AUTO W/O MICRO [28579 CPT(R)] ANTIBODY SCREEN R010481 CPT(R)] CBC WITH AUTOMATED DIFF [96964 CPT(R)] CULTURE, URINE [id number] CPT(R)] HEP B SURFACE AG [id number] CPT(R)] HIV 1/2 AG/AB, 4TH GENERATION,W RFLX CONFIRM [id number] CPT(R)] RUBELLA AB, IGG A642165 CPT(R)] T PALLIDUM SCREEN W/REFLEX [[id number] Custom] VZV AB, IGG [id number] CPT(R)] Follow-up Instructions Return in about 4 weeks (around 2/13/2018). To-Do List   
 01/16/2018 Lab:  CHLAMYDIA/GC PCR Introducing [facility] SERVICES! Dear Leigha Lima: 
Thank you for requesting a [employer] account. Our records indicate that you already have an active Sabre account. You can access your account anytime at [email or web address]. The Other Gu[employer] Did you know that you can access your hospital and ER discharge instructions at any time in Sabre? You can also review all of your test results from your hospital stay or ER visit. Additional Information If you have questions, please visit the Frequently Asked Questions section of the [employer] website at [email or web address] Remember, [employer] is NOT to be used for urgent needs. For medical emergencies, dial 911. Now available from your iPhone and Android! Please provide this summary of care documentation to your next provider. Your primary care clinician is listed as Nam Dinero. If you have any questions after today's visit, please call [phone].

## 2018-01-16 NOTE — PROGRESS NOTES
Chief Complaint   Patient presents with    Ultrasound     dating ultrasound      1. Have you been to the ER, urgent care clinic since your last visit? Hospitalized since your last visit? No    2. Have you seen or consulted any other health care providers outside of the [address] [place] [address] since your last visit? Include any pap smears or colon screening. No     Patient denies any bleeding, cramping or leakage of fluid.

## 2018-01-17 LAB — BACTERIA UR CULT: NO GROWTH

## 2018-01-18 LAB
BASOPHILS # BLD AUTO: 0.1 X10E3/UL (ref 0–0.2)
BASOPHILS NFR BLD AUTO: 1 %
BLD GP AB SCN SERPL QL: NEGATIVE
EOSINOPHIL # BLD AUTO: 0.2 X10E3/UL (ref 0–0.4)
EOSINOPHIL NFR BLD AUTO: 2 %
ERYTHROCYTE [DISTWIDTH] IN BLOOD BY AUTOMATED COUNT: 13.2 % (ref 12.3–15.4)
HBV SURFACE AG SERPL QL IA: NEGATIVE
HCT VFR BLD AUTO: 36.6 % (ref 34–46.6)
HGB BLD-MCNC: 12.3 G/DL (ref 11.1–15.9)
HIV 1+2 AB+HIV1 P24 AG SERPL QL IA: NON REACTIVE
IMM GRANULOCYTES # BLD: 0.1 X10E3/UL (ref 0–0.1)
IMM GRANULOCYTES NFR BLD: 1 %
LYMPHOCYTES # BLD AUTO: 2.3 X10E3/UL (ref 0.7–3.1)
LYMPHOCYTES NFR BLD AUTO: 22 %
MCH RBC QN AUTO: 30.4 PG (ref 26.6–33)
MCHC RBC AUTO-ENTMCNC: 33.6 G/DL (ref 31.5–35.7)
MCV RBC AUTO: 90 FL (ref 79–97)
MONOCYTES # BLD AUTO: 0.7 X10E3/UL (ref 0.1–0.9)
MONOCYTES NFR BLD AUTO: 7 %
NEUTROPHILS # BLD AUTO: 7.4 X10E3/UL (ref 1.4–7)
NEUTROPHILS NFR BLD AUTO: 67 %
PLATELET # BLD AUTO: 253 X10E3/UL (ref 150–379)
RBC # BLD AUTO: 4.05 X10E6/UL (ref 3.77–5.28)
RUBV IGG SERPL IA-ACNC: 2.68 INDEX
T PALLIDUM AB SER QL IA: NEGATIVE
VZV IGG SER IA-ACNC: 560 INDEX
WBC # BLD AUTO: 10.7 X10E3/UL (ref 3.4–10.8)

## 2018-01-22 ENCOUNTER — TELEPHONE (OUTPATIENT)
Dept: FAMILY MEDICINE CLINIC | Age: 23
End: 2018-01-22

## 2018-01-22 NOTE — TELEPHONE ENCOUNTER
[phone]  Patient called to make an ultrasound appt for finding out the sex of the baby and she is only 16 wks ob. Spoke with nurse for advice and that is not done at this office. It is done at 21 weeks at the  center. Please call patient.

## 2018-01-22 NOTE — TELEPHONE ENCOUNTER
Meternal- Fetal Medicine form was completed and faxed to [facility]. Spoke with Meg Sabillon from  center to schedule ultrasound . Patient was scheduled on . Patient was notified of scheduled appointment.

## 2018-01-22 NOTE — PROGRESS NOTES
CBC WNL, Ab negative, Rubella immune, VZV immune, Hep B negative, T Pallidum negative, HIV NR, Urine Cx no growth

## 2018-02-01 NOTE — PROGRESS NOTES
I saw and evaluated the patient, performing the key elements of the service. I discussed the findings, assessment and plan with the resident and agree with the resident's findings and plan as documented in the resident's note. Ultrasound c/w LMP. Cont routine prenatal care. Agree with ultrasound findings per resident notation. Single viable IUP with pos cardiac activity.

## 2018-02-08 ENCOUNTER — TELEPHONE (OUTPATIENT)
Dept: FAMILY MEDICINE CLINIC | Age: 23
End: 2018-02-08

## 2018-02-08 NOTE — TELEPHONE ENCOUNTER
Please call patient, patient have questions on prenatal testing for down syndrome markers and gender. Patient asking if test are done by blood or ultrasound?     Please call , [phone]

## 2018-02-09 NOTE — TELEPHONE ENCOUNTER
I called and spoke with patient and indicated that at next appt blood work will be drawn for Down's Syndrome testing. She also stated that she will be calling back tomorrow to schedule appt for next visit. I told her that she needs to speak with a nurse to schedule appt.

## 2018-03-02 ENCOUNTER — ROUTINE PRENATAL (OUTPATIENT)
Dept: FAMILY MEDICINE CLINIC | Age: 23
End: 2018-03-02

## 2018-03-02 VITALS
TEMPERATURE: 97.9 F | RESPIRATION RATE: 17 BRPM | BODY MASS INDEX: 24.59 KG/M2 | SYSTOLIC BLOOD PRESSURE: 118 MMHG | DIASTOLIC BLOOD PRESSURE: 82 MMHG | HEIGHT: 64 IN | HEART RATE: 93 BPM | OXYGEN SATURATION: 98 % | WEIGHT: 144 LBS

## 2018-03-02 DIAGNOSIS — O98.819 CHLAMYDIA INFECTION AFFECTING PREGNANCY, ANTEPARTUM: ICD-10-CM

## 2018-03-02 DIAGNOSIS — I48.91 ATRIAL FIBRILLATION, UNSPECIFIED TYPE (HCC): ICD-10-CM

## 2018-03-02 DIAGNOSIS — F43.10 PTSD (POST-TRAUMATIC STRESS DISORDER): ICD-10-CM

## 2018-03-02 DIAGNOSIS — Z34.90 PREGNANCY, UNSPECIFIED GESTATIONAL AGE: Primary | ICD-10-CM

## 2018-03-02 DIAGNOSIS — A74.9 CHLAMYDIA INFECTION AFFECTING PREGNANCY, ANTEPARTUM: ICD-10-CM

## 2018-03-02 DIAGNOSIS — F60.2 ANTISOCIAL PERSONALITY DISORDER (HCC): ICD-10-CM

## 2018-03-02 DIAGNOSIS — F19.20 POLYSUBSTANCE DEPENDENCE (HCC): ICD-10-CM

## 2018-03-02 LAB
BILIRUB UR QL STRIP: NEGATIVE
GLUCOSE UR-MCNC: NEGATIVE MG/DL
KETONES P FAST UR STRIP-MCNC: NEGATIVE MG/DL
PH UR STRIP: 5.5 [PH] (ref 4.6–8)
PROT UR QL STRIP: NORMAL
SP GR UR STRIP: 1.02 (ref 1–1.03)
UA UROBILINOGEN AMB POC: NORMAL (ref 0.2–1)
URINALYSIS CLARITY POC: NORMAL
URINALYSIS COLOR POC: YELLOW
URINE BLOOD POC: NORMAL
URINE LEUKOCYTES POC: NORMAL
URINE NITRITES POC: NEGATIVE

## 2018-03-02 NOTE — MR AVS SNAPSHOT
[address] 
[phone] Patient: Judy Mcmillan MRN: [id number] :1995 Visit Information Date & Time Provider Department Dept. Phone Encounter #  
 3/2/2018  8:20 AM Koby Llanes MD [address] [facility] [phone] [id number] Upcoming Health Maintenance Date Due  
 HPV AGE 9Y-34Y (1 of 3 - Female 3 Dose Series) 2005 PAP AKA CERVICAL CYTOLOGY 2020 DTaP/Tdap/Td series (2 - Td) 2024 Allergies as of 3/2/2018  Review Complete On: 3/2/2018 By: Raghu Guerra LPN No Known Allergies Current Immunizations  Reviewed on 2017 Name Date Influenza Vaccine PF 2014  2:32 PM  
 Tdap 2014  2:30 PM  
  
 Not reviewed this visit You Were Diagnosed With   
  
 Codes Comments Pregnancy, unspecified gestational age    -  Primary ICD-10-CM: Z34.90 ICD-9-CM: V22.2 Chlamydia infection affecting pregnancy, antepartum     ICD-10-CM: O98.319, A74.9 ICD-9-CM: 714.70, 079.98 PTSD (post-traumatic stress disorder)     ICD-10-CM: F43.10 ICD-9-CM: 309.81 Antisocial personality disorder     ICD-10-CM: F60.2 ICD-9-CM: 301.7 Atrial fibrillation, unspecified type ([facility]     ICD-10-CM: I48.91 
ICD-9-CM: 427.31 Polysubstance dependence ([facility]     ICD-10-CM: P63.75 ICD-9-CM: 304.80 Vitals BP Pulse Temp Resp Height(growth percentile) Weight(growth percentile) 118/82 (BP 1 Location: Right arm, BP Patient Position: Sitting) 93 97.9 °F (36.6 °C) (Oral) 17 5' 4\" (1.626 m) 144 lb (65.3 kg) LMP SpO2 BMI OB Status Smoking Status 10/21/2017 (Exact Date) 98% 24.72 kg/m2 Pregnant Former Smoker BMI and BSA Data Body Mass Index Body Surface Area 24.72 kg/m 2 1.72 m 2 Preferred Pharmacy Pharmacy Name Phone [id number] [place] AT [employer] [phone] Your Updated Medication List  
  
   
This list is accurate as of 3/2/18  8:47 AM.  Always use your most recent med list.  
  
  
  
  
 cloNIDine HCl 0.1 mg tablet Commonly known as:  CATAPRES Take  by mouth two (2) times a day. doxylamine succinate 25 mg tablet Commonly known as:  Princess Sale Take 1 Tab by mouth nightly as needed for Sleep. * gabapentin 300 mg capsule Commonly known as:  NEURONTIN Take 300 mg by mouth three (3) times daily. * gabapentin 600 mg tablet Commonly known as:  NEURONTIN Take  by mouth daily. Nightly  
  
 imipramine 10 mg tablet Commonly known as:  TOFRANIL Take 10 mg by mouth nightly. OLANZapine 15 mg tablet Commonly known as:  ZyPREXA Take 15 mg by mouth nightly. prenatal vit-calcium-iron-fa 27 mg iron- 1 mg Tab Commonly known as:  PRENATAL PLUS with CALCIUM Take 1 Tab by mouth daily. pyridoxine (vitamin B6) 50 mg tablet Commonly known as:  VITAMIN B-6 Take 1 Tab by mouth three (3) times daily as needed. * Notice: This list has 2 medication(s) that are the same as other medications prescribed for you. Read the directions carefully, and ask your doctor or other care provider to review them with you. We Performed the Following AFP TETRA SCREEN, OBSTETRICAL [id number] CPT(R)] AMB POC URINALYSIS DIP STICK AUTO W/O MICRO [63892 CPT(R)] BLOOD TYPE, (ABO+RH) [15083 CPT(R)] CHLAMYDIA/GC PCR [00947 CPT(R)] Patient Instructions Semanas 14 a 18 de white embarazo: Instrucciones de cuidado - [ Ceclia Rolls 14 to 25 of Your Pregnancy: Care Instructions ] Instrucciones de cuidado Kassy [employer], es posible que se le empiece a notar que está [place]. También podría observar algunos cambios en la piel, megan picazón en algunas zonas de las awa de las leander o acné en la cely.  
Sonia Colla, white bebé puede orinar y dawood primeras heces (meconio) Mira Lawrence a acumularse en el intestino. Kuldip Marino a crecerle el itzel en la nicole. En brito próxima visita, [employer] 18 y 21, brito médico podría hacerle talat ecografía. La prueba le permite al médico verificar si hay ciertos problemas. Brito médico también puede determinar el sexo de brito bebé. Leslye es un buen momento para pensar si [facility] si brito bebé es Elby Coho. Hable con brito médico acerca de ponerse la vacuna contra la gripe para ayudar a mantenerse janet jillian el embarazo. Con el transcurrir del Vikram, es común sentirse preocupada o ansiosa. Brito cuerpo está [employer] Y usted está pensando en peace a gaurav, en la pranay de brito bebé y en convertirse en madre. Puede aprender a sobrellevar la ansiedad y el estrés que siente. La atención de seguimiento es talat parte clave de brito tratamiento y seguridad. Asegúrese de hacer y acudir a todas las citas, y llame a brito médico si está teniendo problemas. También es talat buena idea saber los resultados de los exámenes y mantener talat lista de los medicamentos que gilberto. Cómo puede cuidarse en el hogar? ?Neal Vick ? · Pida ayuda para cocinar y [employer]. ? · Entienda quién o qué le provoca estrés. Evite a estas personas o situaciones tanto megan le sea posible. ? · Relájese todos los días. Tomarse descansos de 10 a 15 minutos puede hacerle sentir tlaat gran diferencia. Camine, escuche música o tome un baño tibio. ? · [place] clases de yoga o educación prenatal para aprender técnicas de relajación. También puede comprar un disco compacto de relajación. ? · [employer] lista de dawood temores acerca de tener el bebé y ser [place]. Comparta la lista con alguien de brito confianza. Vernard Betancourt inquietudes son verdaderamente pequeñas, y trate de deshacerse de ellas. Ejercicio ? · Si no hizo mucho ejercicio antes del embarazo, comience poco a poco. Lo mejor es caminar. Regule brito ritmo y twila un poco más cada día. ? · La caminata rápida, el trote lento, los ejercicios aeróbicos de bajo impacto, los ejercicios aeróbicos en el agua y el yoga son Francie Calico opciones. Algunos deportes, megan el buceo, la equitación, el esquí Julissa, la gimnasia y el esquí acuático no son Brenden estevez. ? · Trate de hacer por lo menos 2½ horas de ejercicio moderado a la semana, megan, por ejemplo, talat caminata rápida. Viridiana Mookie de hacer esto es estar activo 30 minutos al día, por lo menos 5 días de la [place]. Está yobani estar activo en bloques de 10 minutos o más jillian el día y la [place]. ? · Use ropa holgada. Use zapatos y un sostén que le proporcionen un buen soporte. ? · Andrae Lackey de calentamiento y enfriamiento para comenzar y finalizar dawood ejercicios. ? · Si desea usar pesas, asegúrese de que rufus livianas. Estas reducen la tensión en las articulaciones. ?Manténgase en el peso ideal para usted ? · Los expertos recomiendan el aumento de 1 markus (medio kilo) al [facility] jillian los 3 primeros meses del [facility]. ? · También recomiendan aumentar 1 markus a la [employer] 6 últimos meses del [facility], para aumentar de 25 a 35 libras (11 a 16 kg) en total.  
? · Si está por debajo del peso recomendable para usted, necesitará aumentar más, de 28 a 40 libras (13 a 18 kg) aproximadamente. ? · Si tiene sobrepeso, quizás no deba aumentar tanto de [place], de 15 a 25 libras (7 a 11 kg) aproximadamente. ? · Si está subiendo de Zachery Soares, use white sentido común. Andrae Lackey [facility], y Evomail, la comida rápida y [place]. Mark Jordan, rhetts y verduras. ? · Si va a tener gemelos o más bebés, es posible que white médico la remita a un dietista. Dónde puede encontrar más información en inglés? Reddy Benitez a http://luis-keron.info/. Tasia Hanly V424 en la búsqueda para aprender más acerca de \"Semanas 14 a 18 de white embarazo:  Instrucciones de cuidado - [ Master Heater 14 to 25 of Your Pregnancy: Care Instructions ]. \" 
Revisado: 16 marzo, 2017 Versión del contenido: 11.4 © 4086-0886 Healthwise, Incorporated. Las instrucciones de cuidado fueron adaptadas bajo licencia por [employer] (which disclaims liability or warranty for this information). Si usted tiene [place] afección médica o sobre estas instrucciones, siempre pregunte a white profesional de pranay. Healthwise, Incorporated niega toda garantía o responsabilidad por white uso de esta información. Introducing [facility] SERVICES! Dear Shawna Lozoya: 
Thank you for requesting a [employer] account. Our records indicate that you already have an active [employer] account. You can access your account anytime at [email or web address]. KeepIdeas/[employer] Did you know that you can access your hospital and ER discharge instructions at any time in [employer]? You can also review all of your test results from your hospital stay or ER visit. Additional Information If you have questions, please visit the Frequently Asked Questions section of the [employer] website at [email or web address] Remember, [employer] is NOT to be used for urgent needs. For medical emergencies, dial 911. Now available from your iPhone and Android! Please provide this summary of care documentation to your next provider. Your primary care clinician is listed as Verla Babinski. If you have any questions after today's visit, please call [phone].

## 2018-03-02 NOTE — PROGRESS NOTES
I reviewed with the resident the medical history and the resident's findings on the physical examination. I discussed with the resident the patient's diagnosis and concur with the plan. Monika Covarrubias is a 20 y.o. female  at 17w4d. presents for routine PNC. Concerns addressed: Routine PNC  Pregnancy complicated by: Past hx of polysubstance abuse (currently abstienence), PTSD, Depression, Borderline (off meds). Chlamydia this pregnancy, due for BHAVIK. UTI x1 this pregnancy  Denies VB, LOF, Contractions. + Fetal movement. Weight gain reviewed.   RTC in 4 weeks    Visit Vitals    /82 (BP 1 Location: Right arm, BP Patient Position: Sitting)    Pulse 93    Temp 97.9 °F (36.6 °C) (Oral)    Resp 17    Ht 5' 4\" (1.626 m)    Wt 144 lb (65.3 kg)    LMP 10/21/2017 (Exact Date)    SpO2 98%    BMI 24.72 kg/m2     FHT: 508J  FH: below umbilicus    Recent Results (from the past 24 hour(s))   AMB POC URINALYSIS DIP STICK AUTO W/O MICRO    Collection Time: 18  8:25 AM   Result Value Ref Range    Color (UA POC) Yellow     Clarity (UA POC) Cloudy     Glucose (UA POC) Negative Negative    Bilirubin (UA POC) Negative Negative    Ketones (UA POC) Negative Negative    Specific gravity (UA POC) 1.025 1.001 - 1.035    Blood (UA POC) 1+ Negative    pH (UA POC) 5.5 4.6 - 8.0    Protein (UA POC) Trace Negative    Urobilinogen (UA POC) 0.2 mg/dL 0.2 - 1    Nitrites (UA POC) Negative Negative    Leukocyte esterase (UA POC) 1+ Negative

## 2018-03-02 NOTE — PATIENT INSTRUCTIONS
Semanas 14 a 18 de brito embarazo: Instrucciones de cuidado - [ Merry Cuadraton 14 to 25 of Your Pregnancy: Care Instructions ]  Instrucciones de cuidado    Fogd [address], es posible que se le empiece a notar que está [place]. También podría observar algunos cambios en la piel, megan picazón en algunas zonas de las awa de las leander o acné en la cely. [place] [employer], brito bebé puede orinar y dawood primeras heces (meconio) comienzan a acumularse en el intestino. Viet Remak a crecerle el itzel en la nicole. En brito próxima visita, [employer] 18 y 21, brito médico podría hacerle talat ecografía. La prueba le permite al médico verificar si hay ciertos problemas. Brito médico también puede determinar el sexo de brito bebé. Leslye es un buen momento para pensar si Jeniffer Blunt si brito bebé es Loyce Pipes. Hable con brito médico acerca de ponerse la vacuna contra la gripe para ayudar a mantenerse janet jillian el embarazo. Con el transcurrir del [facility], es común sentirse preocupada o ansiosa. Brito cuerpo está [employer] Y usted está pensando en peace a gaurav, en la pranay de brito bebé y en convertirse en madre. Puede aprender a sobrellevar la ansiedad y el estrés que siente. La atención de seguimiento es talat parte clave de brito tratamiento y seguridad. Asegúrese de hacer y acudir a todas las citas, y llame a brito médico si está teniendo problemas. También es talat buena idea saber los resultados de los exámenes y mantener talat lista de los medicamentos que gilberto. ¿Cómo puede cuidarse en el hogar? ?Avery Andino  ? · Pida ayuda para cocinar y [employer]. ? · Entienda quién o qué le provoca estrés. Evite a estas personas o situaciones tanto megan le sea posible. ? · Relájese todos los días. Tomarse descansos de 10 a 15 minutos puede hacerle sentir talat gran diferencia. Camine, escuche música o tome un baño tibio. ? · [place] clases de yoga o educación prenatal para aprender técnicas de relajación.  También puede comprar un disco compacto de relajación. ? · [employer] lista de dawood temores acerca de tener el bebé y ser [place]. Comparta la lista con alguien de white confianza. Treasure Betancourt inquietudes son verdaderamente pequeñas, y trate de deshacerse de ellas. Ejercicio  ? · Si no hizo mucho ejercicio antes del embarazo, comience poco a poco. Lo mejor es caminar. Regule white ritmo y twila un poco más cada día. ? · La caminata rápida, el trote lento, los ejercicios aeróbicos de bajo impacto, los ejercicios aeróbicos en el agua y el yoga son Isabel Ratel opciones. Algunos deportes, megan el buceo, la equitación, el esquí Julissa, la gimnasia y el esquí acuático no son Navdeep estevez. ? · Trate de hacer por lo menos 2½ horas de ejercicio moderado a la semana, megan, por ejemplo, talat caminata rápida. Cherrie Rideau de hacer esto es estar activo 30 minutos al día, por lo menos 5 días de la [place]. Está yobani estar activo en bloques de 10 minutos o más jillian el día y la Boyce. ? · Use ropa holgada. Use zapatos y un sostén que le proporcionen un buen soporte. ? · Zorita [place] de calentamiento y enfriamiento para comenzar y finalizar dawood ejercicios. ? · Si desea usar pesas, asegúrese de que rufus livianas. Estas reducen la tensión en las articulaciones. ?Manténgase en el peso ideal para usted  ? · Los expertos recomiendan el aumento de 1 markus (medio kilo) al [employer] jillian los 3 primeros meses del Zetta Mends. ? · También recomiendan aumentar 1 markus a la [employer] 6 últimos meses del [facility], para aumentar de 25 a 35 libras (11 a 16 kg) en total.   ? · Si está por debajo del peso recomendable para usted, necesitará aumentar más, de 28 a 40 libras (13 a 18 kg) aproximadamente. ? · Si tiene sobrepeso, quizás no deba aumentar tanto de [place], de 15 a 25 libras (7 a 11 kg) aproximadamente. ? · Si está subiendo de Zachery Soares, use white sentido común. Kathy Chad [facility] [employer], y [employer], la comida rápida y [place]. Loetta Leisure, frutas y verduras. ? · Si va a tener gemelos o más bebés, es posible que white médico la remita a un dietista. ¿Dónde puede encontrar más información en inglés? Caridad Quiver a http://luis-keron.info/. Shan Sit W224 en la búsqueda para aprender más acerca de \"Semanas 14 a 18 de white embarazo: Instrucciones de cuidado - [ Davina Leo 14 to 25 of Your Pregnancy: Care Instructions ]. \"  Revisado: 16 marzo, 2017  Versión del contenido: 11.4  © 6611-1218 Healthwise, Incorporated. Las instrucciones de cuidado fueron adaptadas bajo licencia por [employer] (which disclaims liability or warranty for this information). Si usted tiene [place] Fest[place] afección médica o sobre estas instrucciones, siempre pregunte a white profesional de pranay. [place], Supportie niega toda garantía o responsabilidad por white uso de esta información.
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
## 2018-03-02 NOTE — PROGRESS NOTES
Chief Complaint   Patient presents with    Routine Prenatal Visit         Low Blood Sugar     Per Patient Blood Sugar Levels have been 90s, even after eating. Ms/ Steven Flores is in the office today for a routine prenatal visit. Patient denies fluid leakage, vaginal discharge, or abnormal spotting or bleeding. Patient also denies abdominal pain or cramping or contraction at this time. Patient is compliant with taking prenatal vitamins as prescribed. Patient voiced concern in reference to blood sugar level, states blood sugar are running in the 90s after meal and has dropped at low at 40s prior to eating. POC Urinalysis order per standing order. Visit Vitals    /82 (BP 1 Location: Right arm, BP Patient Position: Sitting)    Pulse 93    Temp 97.9 °F (36.6 °C) (Oral)    Resp 17    Ht 5' 4\" (1.626 m)    Wt 144 lb (65.3 kg)    SpO2 98%    BMI 24.72 kg/m2       1. Have you been to the ER, urgent care clinic since your last visit? Hospitalized since your last visit? Yes Where: [facility] [address] Due to Influenza Reason for visit: Influenza    2. Have you seen or consulted any other health care providers outside of the [employer] since your last visit? Include any pap smears or colon screening.  No\

## 2018-03-04 LAB
BACTERIA UR CULT: NORMAL
C TRACH RRNA SPEC QL NAA+PROBE: NEGATIVE
N GONORRHOEA RRNA SPEC QL NAA+PROBE: NEGATIVE

## 2018-03-06 LAB
2ND TRIMESTER 4 SCREEN SERPL-IMP: NORMAL
2ND TRIMESTER 4 SCREEN SERPL-IMP: NORMAL
ABO GROUP BLD: NORMAL
AFP ADJ MOM SERPL: 0.54
AFP SERPL-MCNC: 22.7 NG/ML
AGE AT DELIVERY: 23.6 YEARS
COMMENTS,018272: NORMAL
FET TS 18 RISK FROM MAT AGE: NORMAL
FET TS 21 RISK FROM MAT AGE: 1084
GA METHOD: NORMAL
GA: 17.6 WEEKS
HCG ADJ MOM SERPL: 0.2
HCG SERPL-ACNC: 5852 MIU/ML
IDDM PATIENT QL: NO
INHIBIN A ADJ MOM SERPL: 0.92
INHIBIN A SERPL-MCNC: 163.41 PG/ML
MULTIPLE PREGNANCY: NO
NEURAL TUBE DEFECT RISK FETUS: NORMAL %
RESULTS, 017389: NORMAL
RH BLD: NEGATIVE
TS 18 RISK FETUS: NORMAL
TS 21 RISK FETUS: NORMAL
U ESTRIOL ADJ MOM SERPL: 0.83
U ESTRIOL SERPL-MCNC: 1.02 NG/ML

## 2018-03-20 ENCOUNTER — HOSPITAL ENCOUNTER (OUTPATIENT)
Dept: PERINATAL CARE | Age: 23
Discharge: HOME | End: 2018-03-20
Attending: OBSTETRICS & GYNECOLOGY
Payer: COMMERCIAL

## 2018-03-20 DIAGNOSIS — O09.72 SUPERVISION OF HIGH RISK PREGNANCY DUE TO SOCIAL PROBLEMS IN SECOND TRIMESTER: ICD-10-CM

## 2018-03-20 DIAGNOSIS — I48.91 ATRIAL FIBRILLATION, UNSPECIFIED TYPE (HCC): Primary | ICD-10-CM

## 2018-03-20 PROCEDURE — 76805 OB US >/= 14 WKS SNGL FETUS: CPT | Performed by: OBSTETRICS & GYNECOLOGY

## 2018-03-20 NOTE — PROGRESS NOTES
High risk pregnancy   EDC 01  Pregnancy complicated by hx of atrial fibrillation not currently on medication. Clinically stable. Referral to cardiology. Otherwise [facility] recommended follow up as clinically indicated.

## 2018-03-29 ENCOUNTER — ROUTINE PRENATAL (OUTPATIENT)
Dept: FAMILY MEDICINE CLINIC | Age: 23
End: 2018-03-29

## 2018-03-29 VITALS
TEMPERATURE: 98.8 F | RESPIRATION RATE: 16 BRPM | OXYGEN SATURATION: 99 % | SYSTOLIC BLOOD PRESSURE: 110 MMHG | BODY MASS INDEX: 26.4 KG/M2 | HEIGHT: 64 IN | DIASTOLIC BLOOD PRESSURE: 73 MMHG | HEART RATE: 82 BPM | WEIGHT: 154.6 LBS

## 2018-03-29 DIAGNOSIS — Z3A.21 21 WEEKS GESTATION OF PREGNANCY: Primary | ICD-10-CM

## 2018-03-29 DIAGNOSIS — I48.91 ATRIAL FIBRILLATION, UNSPECIFIED TYPE (HCC): ICD-10-CM

## 2018-03-29 DIAGNOSIS — Z86.19 HISTORY OF CHLAMYDIA: ICD-10-CM

## 2018-03-29 DIAGNOSIS — O26.899 RH NEGATIVE STATE IN ANTEPARTUM PERIOD: ICD-10-CM

## 2018-03-29 DIAGNOSIS — F19.11 HISTORY OF POLYDRUG ABUSE (HCC): ICD-10-CM

## 2018-03-29 DIAGNOSIS — Z67.91 RH NEGATIVE STATE IN ANTEPARTUM PERIOD: ICD-10-CM

## 2018-03-29 LAB
BILIRUB UR QL STRIP: NEGATIVE
GLUCOSE UR-MCNC: NEGATIVE MG/DL
KETONES P FAST UR STRIP-MCNC: NEGATIVE MG/DL
PH UR STRIP: 8.5 [PH] (ref 4.6–8)
PROT UR QL STRIP: NEGATIVE
SP GR UR STRIP: 1.02 (ref 1–1.03)
UA UROBILINOGEN AMB POC: ABNORMAL (ref 0.2–1)
URINALYSIS CLARITY POC: CLEAR
URINALYSIS COLOR POC: YELLOW
URINE BLOOD POC: NEGATIVE
URINE LEUKOCYTES POC: ABNORMAL
URINE NITRITES POC: NEGATIVE

## 2018-03-29 NOTE — MR AVS SNAPSHOT
[address] [facility] [address] [facility] 
[phone] Patient: Pratik Washington MRN: [id number] :1995 Visit Information Date & Time Provider Department Dept. Phone Encounter #  
 3/29/2018  9:00 AM Keren Park MD [address] [place] [address] [phone] [id number] Follow-up Instructions Return in about 4 weeks (around 2018). 2018  1:30 PM  
OB VISIT with Keren Park MD  
[address]) Appt Note: 25 Weeks 1 Day  
 [address] [facility] 3 100[address] [facility]  
[phone]  
  
   
 [address] [place] [address] 3 [facility] [phone] Upcoming Health Maintenance Date Due  
 HPV AGE 9Y-34Y (1 of 3 - Female 3 Dose Series) 2005 PAP AKA CERVICAL CYTOLOGY 2020 DTaP/Tdap/Td series (2 - Td) 2024 Allergies as of 3/29/2018  Review Complete On: 3/29/2018 By: Danni Simmons LPN No Known Allergies Current Immunizations  Reviewed on 2017 Name Date Influenza Vaccine PF 2014  2:32 PM  
 Tdap 2014  2:30 PM  
  
 Not reviewed this visit You Were Diagnosed With   
  
 Codes Comments 21 weeks gestation of pregnancy    -  Primary ICD-10-CM: Z3A.21 
ICD-9-CM: V22.2 Rh negative state in antepartum period     ICD-10-CM: O09.899 ICD-9-CM: V23.89 History of polydrug abuse     ICD-10-CM: Z87.898 ICD-9-CM: 305.93 History of chlamydia     ICD-10-CM: Z86.19 ICD-9-CM: V12.09 Vitals BP Pulse Temp Resp Height(growth percentile) Weight(growth percentile) 110/73 82 98.8 °F (37.1 °C) (Oral) 16 5' 4\" (1.626 m) 154 lb 9.6 oz (70.1 kg) LMP SpO2 BMI OB Status Smoking Status 10/21/2017 (Exact Date) 99% 26.54 kg/m2 Pregnant Former Smoker Vitals History BMI and BSA Data Body Mass Index Body Surface Area  
 26.54 kg/m 2 1.78 m 2 Preferred Pharmacy Pharmacy Name Phone [id number]- [address] [facility] [phone] Your Updated Medication List  
  
   
This list is accurate as of 3/29/18  9:45 AM.  Always use your most recent med list.  
  
  
  
  
 cloNIDine HCl 0.1 mg tablet Commonly known as:  CATAPRES Take  by mouth two (2) times a day. doxylamine succinate 25 mg tablet Commonly known as:  Alphonso Grajeda Take 1 Tab by mouth nightly as needed for Sleep. * gabapentin 300 mg capsule Commonly known as:  NEURONTIN Take 300 mg by mouth three (3) times daily. * gabapentin 600 mg tablet Commonly known as:  NEURONTIN Take  by mouth daily. Nightly  
  
 imipramine 10 mg tablet Commonly known as:  TOFRANIL Take 10 mg by mouth nightly. OLANZapine 15 mg tablet Commonly known as:  ZyPREXA Take 15 mg by mouth nightly. prenatal vit-calcium-iron-fa 27 mg iron- 1 mg Tab Commonly known as:  PRENATAL PLUS with CALCIUM Take 1 Tab by mouth daily. pyridoxine (vitamin B6) 50 mg tablet Commonly known as:  VITAMIN B-6 Take 1 Tab by mouth three (3) times daily as needed. * Notice: This list has 2 medication(s) that are the same as other medications prescribed for you. Read the directions carefully, and ask your doctor or other care provider to review them with you. We Performed the Following 10-PANEL URINE DRUG SCREEN [[id number] Custom] AMB POC URINALYSIS DIP STICK AUTO W/O MICRO [55241 CPT(R)] Follow-up Instructions Return in about 4 weeks (around 4/26/2018). To-Do List   
 03/29/2018 Lab:  CHLAMYDIA/GC PCR Patient Instructions Weeks 22 to 26 of Your Pregnancy: Care Instructions Your Care Instructions As you enter your 7th month of pregnancy at week 26, your baby's lungs are growing stronger and getting ready to breathe. You may notice that your baby responds to the sound of your or your partner's voice.  You may also notice that your baby does less turning and twisting and more squirming or jerking. Jerking often means that your baby has the hiccups. Hiccups are perfectly normal and are only temporary. You may want to think about attending a childbirth preparation class. This is also a good time to start thinking about whether you want to have pain medicine during labor. Most pregnant women are tested for gestational diabetes between weeks 25 and 28. Gestational diabetes occurs when your blood sugar level gets too high when you're pregnant. The test is important, because you can have gestational diabetes and not know it. But the condition can cause problems for your baby. Follow-up care is a key part of your treatment and safety. Be sure to make and go to all appointments, and call your doctor if you are having problems. It's also a good idea to know your test results and keep a list of the medicines you take. How can you care for yourself at home? Ease discomfort from your baby's kicking · Change your position. Sometimes this will cause your baby to change position too. · Take a deep breath while you raise your arm over your head. Then breathe out while you drop your arm. Do Kegel exercises to prevent urine from leaking · You can do Kegel exercises while you stand or sit. ¨ Squeeze the same muscles you would use to stop your urine. Your belly and thighs should not move. ¨ Hold the squeeze for 3 seconds, and then relax for 3 seconds. ¨ Start with 3 seconds. Then add 1 second each week until you are able to squeeze for 10 seconds. ¨ Repeat the exercise 10 to 15 times for each session. Do three or more sessions each day. Ease or reduce swelling in your feet, ankles, hands, and fingers · If your fingers are puffy, take off your rings. · Do not eat high-salt foods, such as potato chips. · Prop up your feet on a stool or couch as much as possible. Sleep with pillows under your feet. · Do not stand for long periods of time or wear tight shoes. · Wear support stockings. Where can you learn more? Go to http://luis-keron.info/. Enter G264 in the search box to learn more about \"Weeks 22 to 26 of Your Pregnancy: Care Instructions. \" Current as of: March 16, 2017 Content Version: 11.4 © 4890-6632 [employer]. Care instructions adapted under license by [employer] (which disclaims liability or warranty for this information). If you have questions about a medical condition or this instruction, always ask your healthcare professional. [place] [address] any warranty or liability for your use of this information. Introducing [facility]! Dear Corazon Soto: 
Thank you for requesting a weave energy account. Our records indicate that you already have an active weave energy account. You can access your account anytime at [email or web address] Did you know that you can access your hospital and ER discharge instructions at any time in weave energy? You can also review all of your test results from your hospital stay or ER visit. Additional Information If you have questions, please visit the Frequently Asked Questions section of the weave energy website at [email or web address] Remember, weave energy is NOT to be used for urgent needs. For medical emergencies, dial 911. Now available from your iPhone and Android! Please provide this summary of care documentation to your next provider. Your primary care clinician is listed as Chantelle Ziegler. If you have any questions after today's visit, please call [phone].
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
## 2018-03-29 NOTE — PATIENT INSTRUCTIONS
Weeks 22 to 26 of Your Pregnancy: Care Instructions  Your Care Instructions    As you enter your 7th month of pregnancy at week 26, your baby's lungs are growing stronger and getting ready to breathe. You may notice that your baby responds to the sound of your or your partner's voice. You may also notice that your baby does less turning and twisting and more squirming or jerking. Jerking often means that your baby has the hiccups. Hiccups are perfectly normal and are only temporary. You may want to think about attending a childbirth preparation class. This is also a good time to start thinking about whether you want to have pain medicine during labor. Most pregnant women are tested for gestational diabetes between weeks 25 and 28. Gestational diabetes occurs when your blood sugar level gets too high when you're pregnant. The test is important, because you can have gestational diabetes and not know it. But the condition can cause problems for your baby. Follow-up care is a key part of your treatment and safety. Be sure to make and go to all appointments, and call your doctor if you are having problems. It's also a good idea to know your test results and keep a list of the medicines you take. How can you care for yourself at home? Ease discomfort from your baby's kicking  · Change your position. Sometimes this will cause your baby to change position too. · Take a deep breath while you raise your arm over your head. Then breathe out while you drop your arm. Do Kegel exercises to prevent urine from leaking  · You can do Kegel exercises while you stand or sit. ¨ Squeeze the same muscles you would use to stop your urine. Your belly and thighs should not move. ¨ Hold the squeeze for 3 seconds, and then relax for 3 seconds. ¨ Start with 3 seconds. Then add 1 second each week until you are able to squeeze for 10 seconds. ¨ Repeat the exercise 10 to 15 times for each session.  Do three or more sessions each day.  Ease or reduce swelling in your feet, ankles, hands, and fingers  · If your fingers are puffy, take off your rings. · Do not eat high-salt foods, such as potato chips. · Prop up your feet on a stool or couch as much as possible. Sleep with pillows under your feet. · Do not stand for long periods of time or wear tight shoes. · Wear support stockings. Where can you learn more? Go to http://luis-keron.info/. Enter G264 in the search box to learn more about \"Weeks 22 to 26 of Your Pregnancy: Care Instructions. \"  Current as of: March 16, 2017  Content Version: 11.4  © 5179-1098 [employer]wise, [employer]. Care instructions adapted under license by [employer] (which disclaims liability or warranty for this information). If you have questions about a medical condition or this instruction, always ask your healthcare professional. [place] [address] any warranty or liability for your use of this information.

## 2018-03-29 NOTE — PROGRESS NOTES
Chief Complaint   Patient presents with    Routine Prenatal Visit     21 Weeks 3 Days       Visit Vitals    /73    Pulse 82    Temp 98.8 °F (37.1 °C) (Oral)    Resp 16    Ht 5' 4\" (1.626 m)    Wt 154 lb 9.6 oz (70.1 kg)    SpO2 99%    BMI 26.54 kg/m2       1. Have you been to the ER, urgent care clinic since your last visit? Hospitalized since your last visit? No    2. Have you seen or consulted any other health care providers outside of the [address] [place] [address] since your last visit? Include any pap smears or colon screening.  No

## 2018-04-24 ENCOUNTER — ROUTINE PRENATAL (OUTPATIENT)
Dept: FAMILY MEDICINE CLINIC | Age: 23
End: 2018-04-24

## 2018-04-24 VITALS
HEIGHT: 64 IN | SYSTOLIC BLOOD PRESSURE: 103 MMHG | BODY MASS INDEX: 27.31 KG/M2 | DIASTOLIC BLOOD PRESSURE: 65 MMHG | WEIGHT: 160 LBS | HEART RATE: 83 BPM | TEMPERATURE: 97.5 F | OXYGEN SATURATION: 100 % | RESPIRATION RATE: 17 BRPM

## 2018-04-24 DIAGNOSIS — F19.11 HISTORY OF POLYDRUG ABUSE (HCC): ICD-10-CM

## 2018-04-24 DIAGNOSIS — Z3A.25 25 WEEKS GESTATION OF PREGNANCY: ICD-10-CM

## 2018-04-24 DIAGNOSIS — Z3A.25 25 WEEKS GESTATION OF PREGNANCY: Primary | ICD-10-CM

## 2018-04-24 DIAGNOSIS — O26.899 RH NEGATIVE STATE IN ANTEPARTUM PERIOD: ICD-10-CM

## 2018-04-24 DIAGNOSIS — Z67.91 RH NEGATIVE STATE IN ANTEPARTUM PERIOD: ICD-10-CM

## 2018-04-24 DIAGNOSIS — Z86.19 HISTORY OF CHLAMYDIA INFECTION: ICD-10-CM

## 2018-04-24 LAB
BILIRUB UR QL STRIP: NEGATIVE
GLUCOSE UR-MCNC: NEGATIVE MG/DL
KETONES P FAST UR STRIP-MCNC: NEGATIVE MG/DL
PH UR STRIP: 7 [PH] (ref 4.6–8)
PROT UR QL STRIP: NEGATIVE
SP GR UR STRIP: 1.02 (ref 1–1.03)
UA UROBILINOGEN AMB POC: NORMAL (ref 0.2–1)
URINALYSIS CLARITY POC: CLEAR
URINALYSIS COLOR POC: YELLOW
URINE BLOOD POC: NEGATIVE
URINE LEUKOCYTES POC: NORMAL
URINE NITRITES POC: NEGATIVE

## 2018-04-24 NOTE — PROGRESS NOTES
at 25 weeks  Rh negative  + chlamydia in first trimester  Hx UTI during early pregnancy    + fetal movements    103/65    FH - 25 cm    FHT -- 145-155 bpm    Hx polysubstance abuse - will check UDS    Chart indicated hx atrial fib -- Dr. Sergio Corrales spoke with cardiologist --- did not have a-fib and was told metoprolol could be discontinued. I reviewed with the resident the medical history and the resident's findings on the physical examination. I discussed with the resident the patient's diagnosis and concur with the plan.
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
## 2018-04-24 NOTE — PATIENT INSTRUCTIONS
Weeks 22 to 26 of Your Pregnancy: Care Instructions  Your Care Instructions    As you enter your 7th month of pregnancy at week 26, your baby's lungs are growing stronger and getting ready to breathe. You may notice that your baby responds to the sound of your or your partner's voice. You may also notice that your baby does less turning and twisting and more squirming or jerking. Jerking often means that your baby has the hiccups. Hiccups are perfectly normal and are only temporary. You may want to think about attending a childbirth preparation class. This is also a good time to start thinking about whether you want to have pain medicine during labor. Most pregnant women are tested for gestational diabetes between weeks 25 and 28. Gestational diabetes occurs when your blood sugar level gets too high when you're pregnant. The test is important, because you can have gestational diabetes and not know it. But the condition can cause problems for your baby. Follow-up care is a key part of your treatment and safety. Be sure to make and go to all appointments, and call your doctor if you are having problems. It's also a good idea to know your test results and keep a list of the medicines you take. How can you care for yourself at home? Ease discomfort from your baby's kicking  · Change your position. Sometimes this will cause your baby to change position too. · Take a deep breath while you raise your arm over your head. Then breathe out while you drop your arm. Do Kegel exercises to prevent urine from leaking  · You can do Kegel exercises while you stand or sit. ¨ Squeeze the same muscles you would use to stop your urine. Your belly and thighs should not move. ¨ Hold the squeeze for 3 seconds, and then relax for 3 seconds. ¨ Start with 3 seconds. Then add 1 second each week until you are able to squeeze for 10 seconds. ¨ Repeat the exercise 10 to 15 times for each session.  Do three or more sessions each day.  Ease or reduce swelling in your feet, ankles, hands, and fingers  · If your fingers are puffy, take off your rings. · Do not eat high-salt foods, such as potato chips. · Prop up your feet on a stool or couch as much as possible. Sleep with pillows under your feet. · Do not stand for long periods of time or wear tight shoes. · Wear support stockings. Where can you learn more? Go to http://luis-keron.info/. Enter G264 in the search box to learn more about \"Weeks 22 to 26 of Your Pregnancy: Care Instructions. \"  Current as of: 2017  Content Version: 11.4  © 4934-4382 [employer]. Care instructions adapted under license by [employer] (which disclaims liability or warranty for this information). If you have questions about a medical condition or this instruction, always ask your healthcare professional. [place] [address] any warranty or liability for your use of this information. Weeks 26 to 30 of Your Pregnancy: Care Instructions  Your Care Instructions    You are now in your last trimester of pregnancy. Your baby is growing rapidly. And you'll probably feel your baby moving around more often. Your doctor may ask you to count your baby's kicks. Your back may ache as your body gets used to your baby's size and length. If you haven't already had the Tdap shot during this pregnancy, talk to your doctor about getting it. It will help protect your  against pertussis infection. During this time, it's important to take care of yourself and pay attention to what your body needs. If you feel sexual, explore ways to be close with your partner that match your comfort and desire. Use the tips provided in this care sheet to find ways to be sexual in your own way. Follow-up care is a key part of your treatment and safety. Be sure to make and go to all appointments, and call your doctor if you are having problems.  It's also a good idea to know your test results and keep a list of the medicines you take. How can you care for yourself at home? Take it easy at work  · Take frequent breaks. If possible, stop working when you are tired, and rest during your lunch hour. · Take bathroom breaks every 2 hours. · Change positions often. If you sit for long periods, stand up and walk around. · When you stand for a long time, keep one foot on a low stool with your knee bent. After standing a lot, sit with your feet up. · Avoid fumes, chemicals, and tobacco smoke. Be sexual in your own way  · Having sex during pregnancy is okay, unless your doctor tells you not to. · You may be very interested in sex, or you may have no interest at all. · Your growing belly can make it hard to find a good position during intercourse. [place] and explore. · You may get cramps in your uterus when your partner touches your breasts. · A back rub may relieve the backache or cramps that sometimes follow orgasm. Learn about  labor  · Watch for signs of  labor. You may be going into labor if:  ¨ You have menstrual-like cramps, with or without nausea. ¨ You have about 4 or more contractions in 20 minutes, or about 8 or more within 1 hour, even after you have had a glass of water and are resting. ¨ You have a low, dull backache that does not go away when you change your position. ¨ You have pain or pressure in your pelvis that comes and goes in a pattern. ¨ You have intestinal cramping or flu-like symptoms, with or without diarrhea. ¨ You notice an increase or change in your vaginal discharge. Discharge may be heavy, mucus-like, watery, or streaked with blood. ¨ Your water breaks. · If you think you have  labor:  ¨ Drink 2 or 3 glasses of water or juice. Not drinking enough fluids can cause contractions. ¨ Stop what you are doing, and empty your bladder. Then lie down on your left side for at least 1 hour.   ¨ While lying on your side, find your breast bone. Put your fingers in the soft spot just below it. Move your fingers down toward your belly button to find the top of your uterus. Check to see if it is tight. ¨ Contractions can be weak or strong. Record your contractions for an hour. Time a contraction from the start of one contraction to the start of the next one. ¨ Single or several strong contractions without a pattern are called Mark-Simpson contractions. They are practice contractions but not the start of labor. They often stop if you change what you are doing. ¨ Call your doctor if you have regular contractions. Where can you learn more? Go to http://luis-keron.info/. Enter W963 in the search box to learn more about \"Weeks 26 to 30 of Your Pregnancy: Care Instructions. \"  Current as of: March 16, 2017  Content Version: 11.4  © 8381-0213 [employer]. Care instructions adapted under license by [employer] (which disclaims liability or warranty for this information). If you have questions about a medical condition or this instruction, always ask your healthcare professional. [address] any warranty or liability for your use of this information.

## 2018-04-24 NOTE — MR AVS SNAPSHOT
[address] 
[phone] Patient: Mayo Garvey MRN: [id number] :1995 Visit Information Date & Time Provider Department Dept. Phone Encounter #  
 2018  1:30 PM Garrett العراقي MD [facility] [phone] [id number] Your Appointments 2018  9:00 AM  
Nurse Visit with NURSE [facility] ([address])  
 [address] [place], [place] [address] [address]  
[phone]  
  
   
 [address] [place], [place] [address] [address]  
  
    
  
 2018  9:15 AM  
OB VISIT with Garrett العراقي MD  
[facility] [address] [place] [address]) Appt Note: routine prenatal  
 [address] [place], [place] [address] [address]  
[phone]  
  
   
 [address] [place], [place] [address] [address] [address] Upcoming Health Maintenance Date Due  
 HPV Age 9Y-34Y (1 of 1 - Female 3 Dose Series) 2005 PAP AKA CERVICAL CYTOLOGY 2020 DTaP/Tdap/Td series (2 - Td) 2024 Allergies as of 2018  Review Complete On: 2018 By: Leandro Zuniga LPN No Known Allergies Current Immunizations  Reviewed on 2017 Name Date Influenza Vaccine PF 2014  2:32 PM  
 Tdap 2018, 2014  2:30 PM  
  
 Not reviewed this visit You Were Diagnosed With   
  
 Codes Comments Prenatal care in second trimester    -  Primary ICD-10-CM: Z34.92 
ICD-9-CM: V22.1 Encounter for supervision of normal pregnancy, antepartum, unspecified      ICD-10-CM: Z34.90 ICD-9-CM: V22.1 Rh negative state in antepartum period     ICD-10-CM: O09.899, Z67.91 
ICD-9-CM: V23.89 History of polydrug abuse     ICD-10-CM: Z87.898 ICD-9-CM: 305.93 Vitals BP Pulse Temp Resp Height(growth percentile) Weight(growth percentile) 103/65 83 97.5 °F (36.4 °C) (Oral) 17 5' 4\" (1.626 m) 160 lb (72.6 kg) LMP SpO2 BMI OB Status Smoking Status 10/21/2017 (Exact Date) 100% 27.46 kg/m2 Pregnant Former Smoker Vitals History BMI and BSA Data Body Mass Index Body Surface Area  
 27.46 kg/m 2 1.81 m 2 Preferred Pharmacy Pharmacy Name Phone [id number] [facility] [address] [phone] Your Updated Medication List  
  
   
This list is accurate as of 4/24/18  2:14 PM.  Always use your most recent med list.  
  
  
  
  
 cloNIDine HCl 0.1 mg tablet Commonly known as:  CATAPRES Take  by mouth two (2) times a day. doxylamine succinate 25 mg tablet Commonly known as:  Jacques Rain Take 1 Tab by mouth nightly as needed for Sleep. * gabapentin 300 mg capsule Commonly known as:  NEURONTIN Take 300 mg by mouth three (3) times daily. * gabapentin 600 mg tablet Commonly known as:  NEURONTIN Take  by mouth daily. Nightly  
  
 imipramine 10 mg tablet Commonly known as:  TOFRANIL Take 10 mg by mouth nightly. OLANZapine 15 mg tablet Commonly known as:  ZyPREXA Take 15 mg by mouth nightly. prenatal vit-calcium-iron-fa 27 mg iron- 1 mg Tab Commonly known as:  PRENATAL PLUS with CALCIUM Take 1 Tab by mouth daily. pyridoxine (vitamin B6) 50 mg tablet Commonly known as:  VITAMIN B-6 Take 1 Tab by mouth three (3) times daily as needed. * Notice: This list has 2 medication(s) that are the same as other medications prescribed for you. Read the directions carefully, and ask your doctor or other care provider to review them with you. We Performed the Following AMB POC URINALYSIS DIP STICK AUTO W/O MICRO [97965 CPT(R)] CHLAMYDIA/GC PCR [50254 CPT(R)] HGB & HCT [85906 CPT(R)] TETANUS, DIPHTHERIA TOXOIDS AND ACELLULAR PERTUSSIS VACCINE (TDAP), IN INDIVIDS. >=7, IM [id number] CPT(R)] To-Do List   
 04/24/2018 Lab:  CBC W/O DIFF   
  
 04/24/2018 Lab: GLUCOSE, GESTATIONAL 1 HR TOLERANCE Patient Instructions Weeks 22 to 26 of Your Pregnancy: Care Instructions Your Care Instructions As you enter your 7th month of pregnancy at week 26, your baby's lungs are growing stronger and getting ready to breathe. You may notice that your baby responds to the sound of your or your partner's voice. You may also notice that your baby does less turning and twisting and more squirming or jerking. Jerking often means that your baby has the hiccups. Hiccups are perfectly normal and are only temporary. You may want to think about attending a childbirth preparation class. This is also a good time to start thinking about whether you want to have pain medicine during labor. Most pregnant women are tested for gestational diabetes between weeks 25 and 28. Gestational diabetes occurs when your blood sugar level gets too high when you're pregnant. The test is important, because you can have gestational diabetes and not know it. But the condition can cause problems for your baby. Follow-up care is a key part of your treatment and safety. Be sure to make and go to all appointments, and call your doctor if you are having problems. It's also a good idea to know your test results and keep a list of the medicines you take. How can you care for yourself at home? Ease discomfort from your baby's kicking · Change your position. Sometimes this will cause your baby to change position too. · Take a deep breath while you raise your arm over your head. Then breathe out while you drop your arm. Do Kegel exercises to prevent urine from leaking · You can do Kegel exercises while you stand or sit. ¨ Squeeze the same muscles you would use to stop your urine. Your belly and thighs should not move. ¨ Hold the squeeze for 3 seconds, and then relax for 3 seconds. ¨ Start with 3 seconds. Then add 1 second each week until you are able to squeeze for 10 seconds. ¨ Repeat the exercise 10 to 15 times for each session. Do three or more sessions each day. Ease or reduce swelling in your feet, ankles, hands, and fingers · If your fingers are puffy, take off your rings. · Do not eat high-salt foods, such as potato chips. · Prop up your feet on a stool or couch as much as possible. Sleep with pillows under your feet. · Do not stand for long periods of time or wear tight shoes. · Wear support stockings. Where can you learn more? Go to http://luis-keron.info/. Enter G264 in the search box to learn more about \"Weeks 22 to 26 of Your Pregnancy: Care Instructions. \" Current as of: 2017 Content Version: 11.4 © 6725-7867 [employer]. Care instructions adapted under license by [employer] (which disclaims liability or warranty for this information). If you have questions about a medical condition or this instruction, always ask your healthcare professional. [place] [address] any warranty or liability for your use of this information. Weeks 26 to 30 of Your Pregnancy: Care Instructions Your Care Instructions You are now in your last trimester of pregnancy. Your baby is growing rapidly. And you'll probably feel your baby moving around more often. Your doctor may ask you to count your baby's kicks. Your back may ache as your body gets used to your baby's size and length. If you haven't already had the Tdap shot during this pregnancy, talk to your doctor about getting it. It will help protect your  against pertussis infection. During this time, it's important to take care of yourself and pay attention to what your body needs. If you feel sexual, explore ways to be close with your partner that match your comfort and desire. Use the tips provided in this care sheet to find ways to be sexual in your own way. Follow-up care is a key part of your treatment and safety.  Be sure to make and go to all appointments, and call your doctor if you are having problems. It's also a good idea to know your test results and keep a list of the medicines you take. How can you care for yourself at home? Take it easy at work · Take frequent breaks. If possible, stop working when you are tired, and rest during your lunch hour. · Take bathroom breaks every 2 hours. · Change positions often. If you sit for long periods, stand up and walk around. · When you stand for a long time, keep one foot on a low stool with your knee bent. After standing a lot, sit with your feet up. · Avoid fumes, chemicals, and tobacco smoke. Be sexual in your own way · Having sex during pregnancy is okay, unless your doctor tells you not to. · You may be very interested in sex, or you may have no interest at all. · Your growing belly can make it hard to find a good position during intercourse. [place] and explore. · You may get cramps in your uterus when your partner touches your breasts. · A back rub may relieve the backache or cramps that sometimes follow orgasm. Learn about  labor · Watch for signs of  labor. You may be going into labor if: 
¨ You have menstrual-like cramps, with or without nausea. ¨ You have about 4 or more contractions in 20 minutes, or about 8 or more within 1 hour, even after you have had a glass of water and are resting. ¨ You have a low, dull backache that does not go away when you change your position. ¨ You have pain or pressure in your pelvis that comes and goes in a pattern. ¨ You have intestinal cramping or flu-like symptoms, with or without diarrhea. ¨ You notice an increase or change in your vaginal discharge. Discharge may be heavy, mucus-like, watery, or streaked with blood. ¨ Your water breaks. · If you think you have  labor: ¨ Drink 2 or 3 glasses of water or juice. Not drinking enough fluids can cause contractions. ¨ Stop what you are doing, and empty your bladder. Then lie down on your left side for at least 1 hour. ¨ While lying on your side, find your breast bone. Put your fingers in the soft spot just below it. Move your fingers down toward your belly button to find the top of your uterus. Check to see if it is tight. ¨ Contractions can be weak or strong. Record your contractions for an hour. Time a contraction from the start of one contraction to the start of the next one. ¨ Single or several strong contractions without a pattern are called Mark-Simpson contractions. They are practice contractions but not the start of labor. They often stop if you change what you are doing. ¨ Call your doctor if you have regular contractions. Where can you learn more? Go to http://luis-keron.info/. Enter L840 in the search box to learn more about \"Weeks 26 to 30 of Your Pregnancy: Care Instructions. \" Current as of: March 16, 2017 Content Version: 11.4 © 0352-6062 [employer]. Care instructions adapted under license by [employer] (which disclaims liability or warranty for this information). If you have questions about a medical condition or this instruction, always ask your healthcare professional. [place] [address] any warranty or liability for your use of this information. Introducing [facility]! Dear Heriberto Funes: 
Thank you for requesting a [employer]My[employer] account. Our records indicate that you already have an active [employer]My[employer] account. You can access your account anytime at [email or web address]://[employer]. [email or web address] TV/[employer] Did you know that you can access your hospital and ER discharge instructions at any time in BuyMyHome? You can also review all of your test results from your hospital stay or ER visit. Additional Information If you have questions, please visit the Frequently Asked Questions section of the [employer] website at [email or web address] Remember, [employer] is NOT to be used for urgent needs. For medical emergencies, dial 911. Now available from your iPhone and Android! Please provide this summary of care documentation to your next provider. Your primary care clinician is listed as Lavern Neil. If you have any questions after today's visit, please call [phone].

## 2018-04-24 NOTE — PROGRESS NOTES
Chief Complaint   Patient presents with    Routine Prenatal Visit     1. Have you been to the ER, urgent care clinic since your last visit? Hospitalized since your last visit? No    2. Have you seen or consulted any other health care providers outside of the [address] [place] [address] since your last visit? Include any pap smears or colon screening.  No     No abnormal bleeding or discharge    Can feel baby move

## 2018-05-18 ENCOUNTER — LAB ONLY (OUTPATIENT)
Dept: FAMILY MEDICINE CLINIC | Age: 23
End: 2018-05-18

## 2018-05-18 DIAGNOSIS — Z67.91 RH NEGATIVE STATUS DURING PREGNANCY IN SECOND TRIMESTER: Primary | ICD-10-CM

## 2018-05-18 DIAGNOSIS — O26.892 RH NEGATIVE STATUS DURING PREGNANCY IN SECOND TRIMESTER: Primary | ICD-10-CM

## 2018-05-19 LAB
ERYTHROCYTE [DISTWIDTH] IN BLOOD BY AUTOMATED COUNT: 12.9 % (ref 12.3–15.4)
GLUCOSE 1H P 50 G GLC PO SERPL-MCNC: 133 MG/DL (ref 65–139)
HCT VFR BLD AUTO: 34.9 % (ref 34–46.6)
HGB BLD-MCNC: 11.4 G/DL (ref 11.1–15.9)
MCH RBC QN AUTO: 30 PG (ref 26.6–33)
MCHC RBC AUTO-ENTMCNC: 32.7 G/DL (ref 31.5–35.7)
MCV RBC AUTO: 92 FL (ref 79–97)
PLATELET # BLD AUTO: 222 X10E3/UL (ref 150–379)
RBC # BLD AUTO: 3.8 X10E6/UL (ref 3.77–5.28)
WBC # BLD AUTO: 15.3 X10E3/UL (ref 3.4–10.8)

## 2018-05-22 NOTE — PROGRESS NOTES
1 hour GTT upper side normal. No risk factor for gestational diabetes. No indications to perform 3 hours GTT. Will check random POC BG next visit. Discussed with Dr. Francine Salas ( the attending physician). CBC with Hg of 11.4, Plt 222.

## 2018-05-24 ENCOUNTER — ROUTINE PRENATAL (OUTPATIENT)
Dept: FAMILY MEDICINE CLINIC | Age: 23
End: 2018-05-24

## 2018-05-24 VITALS
RESPIRATION RATE: 17 BRPM | HEIGHT: 64 IN | SYSTOLIC BLOOD PRESSURE: 98 MMHG | OXYGEN SATURATION: 97 % | TEMPERATURE: 99 F | WEIGHT: 168 LBS | HEART RATE: 91 BPM | BODY MASS INDEX: 28.68 KG/M2 | DIASTOLIC BLOOD PRESSURE: 64 MMHG

## 2018-05-24 DIAGNOSIS — Z34.90 PRENATAL CARE, ANTEPARTUM: Primary | ICD-10-CM

## 2018-05-24 PROBLEM — R00.0 TACHYCARDIA: Status: ACTIVE | Noted: 2017-11-30

## 2018-05-24 LAB
BILIRUB UR QL STRIP: NEGATIVE
GLUCOSE POC: 104 MG/DL
GLUCOSE UR-MCNC: NEGATIVE MG/DL
KETONES P FAST UR STRIP-MCNC: NEGATIVE MG/DL
PH UR STRIP: 7 [PH] (ref 4.6–8)
PROT UR QL STRIP: NEGATIVE
SP GR UR STRIP: 1.02 (ref 1–1.03)
UA UROBILINOGEN AMB POC: NORMAL (ref 0.2–1)
URINALYSIS CLARITY POC: CLEAR
URINALYSIS COLOR POC: YELLOW
URINE BLOOD POC: NEGATIVE
URINE LEUKOCYTES POC: NORMAL
URINE NITRITES POC: NEGATIVE

## 2018-05-24 NOTE — PROGRESS NOTES
I reviewed with the resident the medical history and the resident's findings on the physical examination. I discussed with the resident the patient's diagnosis and concur with the plan. 23yo  @ 29.3 by LMP=first tri sono  1. IUP: GTT WNL, anatomy normal, s/p tdap   2. Rh neg: s/p rhogam on 18  3. Hx chlamydia: treated with neg BHAVIK, repeat with GBS  4. Hx UTI: treated and neg BHAVIK  5. Hx substance abuse: UDS's negative in pregnancy, checking Q trimester and at time of labor   6. Psych disorder: will need close f/u for PP depression screening   7.   Hx tachycardia: per pt's cardiologist this is due to her hx of alcohol abuse (Pt reports a hx of Afib which cardiology denies)
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
## 2018-05-24 NOTE — PATIENT INSTRUCTIONS
Weeks 26 to 30 of Your Pregnancy: Care Instructions  Your Care Instructions    You are now in your last trimester of pregnancy. Your baby is growing rapidly. And you'll probably feel your baby moving around more often. Your doctor may ask you to count your baby's kicks. Your back may ache as your body gets used to your baby's size and length. If you haven't already had the Tdap shot during this pregnancy, talk to your doctor about getting it. It will help protect your  against pertussis infection. During this time, it's important to take care of yourself and pay attention to what your body needs. If you feel sexual, explore ways to be close with your partner that match your comfort and desire. Use the tips provided in this care sheet to find ways to be sexual in your own way. Follow-up care is a key part of your treatment and safety. Be sure to make and go to all appointments, and call your doctor if you are having problems. It's also a good idea to know your test results and keep a list of the medicines you take. How can you care for yourself at home? Take it easy at work  · Take frequent breaks. If possible, stop working when you are tired, and rest during your lunch hour. · Take bathroom breaks every 2 hours. · Change positions often. If you sit for long periods, stand up and walk around. · When you stand for a long time, keep one foot on a low stool with your knee bent. After standing a lot, sit with your feet up. · Avoid fumes, chemicals, and tobacco smoke. Be sexual in your own way  · Having sex during pregnancy is okay, unless your doctor tells you not to. · You may be very interested in sex, or you may have no interest at all. · Your growing belly can make it hard to find a good position during intercourse. [place] and explore. · You may get cramps in your uterus when your partner touches your breasts.   · A back rub may relieve the backache or cramps that sometimes follow orgasm. Learn about  labor  · Watch for signs of  labor. You may be going into labor if:  ¨ You have menstrual-like cramps, with or without nausea. ¨ You have about 4 or more contractions in 20 minutes, or about 8 or more within 1 hour, even after you have had a glass of water and are resting. ¨ You have a low, dull backache that does not go away when you change your position. ¨ You have pain or pressure in your pelvis that comes and goes in a pattern. ¨ You have intestinal cramping or flu-like symptoms, with or without diarrhea. ¨ You notice an increase or change in your vaginal discharge. Discharge may be heavy, mucus-like, watery, or streaked with blood. ¨ Your water breaks. · If you think you have  labor:  ¨ Drink 2 or 3 glasses of water or juice. Not drinking enough fluids can cause contractions. ¨ Stop what you are doing, and empty your bladder. Then lie down on your left side for at least 1 hour. ¨ While lying on your side, find your breast bone. Put your fingers in the soft spot just below it. Move your fingers down toward your belly button to find the top of your uterus. Check to see if it is tight. ¨ Contractions can be weak or strong. Record your contractions for an hour. Time a contraction from the start of one contraction to the start of the next one. ¨ Single or several strong contractions without a pattern are called Mark-Simpson contractions. They are practice contractions but not the start of labor. They often stop if you change what you are doing. ¨ Call your doctor if you have regular contractions. Where can you learn more? Go to http://luis-keron.info/. Enter E346 in the search box to learn more about \"Weeks 26 to 30 of Your Pregnancy: Care Instructions. \"  Current as of: 2017  Content Version: 11.4  © 8953-6460 [employer].  Care instructions adapted under license by [employer] (which disclaims liability or warranty for this information). If you have questions about a medical condition or this instruction, always ask your healthcare professional. [place] [address] any warranty or liability for your use of this information.

## 2018-05-24 NOTE — MR AVS SNAPSHOT
[address] 
[phone] Patient: Antonieta Mcgarry MRN: [id number] :1995 Visit Information Date & Time Provider Department Dept. Phone Encounter #  
 2018  9:15 AM Valentina Ayala MD [facility] [phone] [id number] Follow-up Instructions Return in about 2 weeks (around 2018). 2018 11:05 AM  
OB VISIT with Valentina Ayala MD  
[facility]: routine prenatal  
 [address]  
[phone]  
  
   
 [phone] Upcoming Health Maintenance Date Due  
 HPV Age 9Y-34Y (1 of 1 - Female 3 Dose Series) 2005 Influenza Age 5 to Adult 2018 PAP AKA CERVICAL CYTOLOGY 2020 DTaP/Tdap/Td series (3 - Td) 2028 Allergies as of 2018  Review Complete On: 2018 By: Payam Sotelo LPN No Known Allergies Current Immunizations  Reviewed on 2018 Name Date Influenza Vaccine PF 2014  2:32 PM  
 Rho(D) Immune Globulin - IM 2018 Tdap 2018, 2014  2:30 PM  
  
 Reviewed by Valentina Ayala MD on 2018 at  9:26 AM  
You Were Diagnosed With   
  
 Codes Comments Prenatal care, antepartum    -  Primary ICD-10-CM: Z34.90 ICD-9-CM: V22.1 Vitals BP Pulse Temp Resp Height(growth percentile) Weight(growth percentile) 98/64 91 99 °F (37.2 °C) (Oral) 17 5' 4\" (1.626 m) 168 lb (76.2 kg) LMP SpO2 BMI OB Status Smoking Status 10/21/2017 (Exact Date) 97% 28.84 kg/m2 Pregnant Former Smoker Vitals History BMI and BSA Data Body Mass Index Body Surface Area  
 28.84 kg/m 2 1.85 m 2 Preferred Pharmacy Pharmacy Name Phone [id number] [address] [facility] [phone] Your Updated Medication List  
  
   
This list is accurate as of 18  9:46 AM.  Always use your most recent med list.  
  
  
  
  
 doxylamine succinate 25 mg tablet Commonly known as:  Georgia Gutierrez Take 1 Tab by mouth nightly as needed for Sleep.  
  
 prenatal vit-calcium-iron-fa 27 mg iron- 1 mg Tab Commonly known as:  PRENATAL PLUS with CALCIUM Take 1 Tab by mouth daily. pyridoxine (vitamin B6) 50 mg tablet Commonly known as:  VITAMIN B-6 Take 1 Tab by mouth three (3) times daily as needed. We Performed the Following AMB POC GLUCOSE BLOOD, BY GLUCOSE MONITORING DEVICE [58494 CPT(R)] AMB POC URINALYSIS DIP STICK AUTO W/O MICRO [09324 CPT(R)] Follow-up Instructions Return in about 2 weeks (around 2018). Patient Instructions Weeks 26 to 30 of Your Pregnancy: Care Instructions Your Care Instructions You are now in your last trimester of pregnancy. Your baby is growing rapidly. And you'll probably feel your baby moving around more often. Your doctor may ask you to count your baby's kicks. Your back may ache as your body gets used to your baby's size and length. If you haven't already had the Tdap shot during this pregnancy, talk to your doctor about getting it. It will help protect your  against pertussis infection. During this time, it's important to take care of yourself and pay attention to what your body needs. If you feel sexual, explore ways to be close with your partner that match your comfort and desire. Use the tips provided in this care sheet to find ways to be sexual in your own way. Follow-up care is a key part of your treatment and safety. Be sure to make and go to all appointments, and call your doctor if you are having problems. It's also a good idea to know your test results and keep a list of the medicines you take. How can you care for yourself at home? Take it easy at work · Take frequent breaks. If possible, stop working when you are tired, and rest during your lunch hour. · Take bathroom breaks every 2 hours. · Change positions often. If you sit for long periods, stand up and walk around. · When you stand for a long time, keep one foot on a low stool with your knee bent. After standing a lot, sit with your feet up. · Avoid fumes, chemicals, and tobacco smoke. Be sexual in your own way · Having sex during pregnancy is okay, unless your doctor tells you not to. · You may be very interested in sex, or you may have no interest at all. · Your growing belly can make it hard to find a good position during intercourse. [place] and explore. · You may get cramps in your uterus when your partner touches your breasts. · A back rub may relieve the backache or cramps that sometimes follow orgasm. Learn about  labor · Watch for signs of  labor. You may be going into labor if: 
¨ You have menstrual-like cramps, with or without nausea. ¨ You have about 4 or more contractions in 20 minutes, or about 8 or more within 1 hour, even after you have had a glass of water and are resting. ¨ You have a low, dull backache that does not go away when you change your position. ¨ You have pain or pressure in your pelvis that comes and goes in a pattern. ¨ You have intestinal cramping or flu-like symptoms, with or without diarrhea. ¨ You notice an increase or change in your vaginal discharge. Discharge may be heavy, mucus-like, watery, or streaked with blood. ¨ Your water breaks. · If you think you have  labor: ¨ Drink 2 or 3 glasses of water or juice. Not drinking enough fluids can cause contractions. ¨ Stop what you are doing, and empty your bladder. Then lie down on your left side for at least 1 hour. ¨ While lying on your side, find your breast bone. Put your fingers in the soft spot just below it.  Move your fingers down toward your belly button to find the top of your uterus. Check to see if it is tight. ¨ Contractions can be weak or strong. Record your contractions for an hour. Time a contraction from the start of one contraction to the start of the next one. ¨ Single or several strong contractions without a pattern are called [place]-Simpson contractions. They are practice contractions but not the start of labor. They often stop if you change what you are doing. ¨ Call your doctor if you have regular contractions. Where can you learn more? Go to http://luis-keron.info/. Enter O362 in the search box to learn more about \"Weeks 26 to 30 of Your Pregnancy: Care Instructions. \" Current as of: March 16, 2017 Content Version: 11.4 © 2624-9483 [employer]. Care instructions adapted under license by [employer] (which disclaims liability or warranty for this information). If you have questions about a medical condition or this instruction, always ask your healthcare professional. [place] [address] any warranty or liability for your use of this information. Introducing [facility]! Dear Joe Eid: 
Thank you for requesting a [employer] account. Our records indicate that you already have an active [employer] account. You can access your account anytime at [email or web address] Did you know that you can access your hospital and ER discharge instructions at any time in [employer]? You can also review all of your test results from your hospital stay or ER visit. Additional Information If you have questions, please visit the Frequently Asked Questions section of the [employer] website at [email or web address] Remember, [employer] is NOT to be used for urgent needs. For medical emergencies, dial 911. Now available from your iPhone and Android! Please provide this summary of care documentation to your next provider. Your primary care clinician is listed as Jyl Felty. If you have any questions after today's visit, please call [phone].

## 2018-05-24 NOTE — PROGRESS NOTES
Chief Complaint   Patient presents with    Routine Prenatal Visit     1. Have you been to the ER, urgent care clinic since your last visit? Hospitalized since your last visit? No    2. Have you seen or consulted any other health care providers outside of the [address] [place] [address] since your last visit? Include any pap smears or colon screening.  No     No abnormal bleeding or discharge    Can feel baby move

## 2018-06-08 ENCOUNTER — ROUTINE PRENATAL (OUTPATIENT)
Dept: FAMILY MEDICINE CLINIC | Age: 23
End: 2018-06-08

## 2018-06-08 VITALS
SYSTOLIC BLOOD PRESSURE: 118 MMHG | WEIGHT: 172.4 LBS | OXYGEN SATURATION: 96 % | TEMPERATURE: 98.2 F | BODY MASS INDEX: 29.43 KG/M2 | HEART RATE: 96 BPM | HEIGHT: 64 IN | RESPIRATION RATE: 18 BRPM | DIASTOLIC BLOOD PRESSURE: 81 MMHG

## 2018-06-08 DIAGNOSIS — R00.0 TACHYCARDIA: Primary | ICD-10-CM

## 2018-06-08 DIAGNOSIS — Z34.83 ENCOUNTER FOR SUPERVISION OF OTHER NORMAL PREGNANCY IN THIRD TRIMESTER: ICD-10-CM

## 2018-06-08 LAB
BILIRUB UR QL STRIP: NEGATIVE
GLUCOSE UR-MCNC: NEGATIVE MG/DL
KETONES P FAST UR STRIP-MCNC: NEGATIVE MG/DL
PH UR STRIP: 6 [PH] (ref 4.6–8)
PROT UR QL STRIP: NEGATIVE
SP GR UR STRIP: 1.02 (ref 1–1.03)
UA UROBILINOGEN AMB POC: NORMAL (ref 0.2–1)
URINALYSIS CLARITY POC: CLEAR
URINALYSIS COLOR POC: YELLOW
URINE BLOOD POC: NEGATIVE
URINE LEUKOCYTES POC: NORMAL
URINE NITRITES POC: NEGATIVE

## 2018-06-08 NOTE — PROGRESS NOTES
Identified Patient with two Patient identifiers (Name and ). Two Patient Identifiers confirmed. Reviewed record in preparation for visit and have obtained necessary documentation. Patient in the office to for routine prenatal appointment today. Patient denies vaginal discharge, abnormal vaginal spotting/bleeding or fluid leakage. Patient c/o right pelvic pain/cramping that radiates to the left side of the pelvis, denies contractions. States she is compliant with taking prenatal vitamins as prescribed. No further concerns voiced at this time     Chief Complaint   Patient presents with    Routine Prenatal Visit      -  31 Weeks 4 Days    Pelvic Pain     c/o right side pelvic pain that radiates to the left side of the pelvis, per patient pain begin this morning       Visit Vitals    /81 (BP 1 Location: Left arm, BP Patient Position: Sitting)    Pulse 96    Temp 98.2 °F (36.8 °C) (Oral)    Resp 18    Ht 5' 4\" (1.626 m)    Wt 172 lb 6.4 oz (78.2 kg)    SpO2 96%    BMI 29.59 kg/m2       1. Have you been to the ER, urgent care clinic since your last visit? Hospitalized since your last visit? No    2. Have you seen or consulted any other health care providers outside of the [address] [place] [address] since your last visit? Include any pap smears or colon screening.  No

## 2018-06-08 NOTE — PATIENT INSTRUCTIONS

## 2018-06-08 NOTE — MR AVS SNAPSHOT
[address] 
[phone] Patient: Nicolle Reid MRN: [id number] :1995 Visit Information Date & Time Provider Department Dept. Phone Encounter #  
 2018 11:05 AM Monica Cain MD [address] [place] [address] [phone] [id number] Follow-up Instructions Return in about 2 weeks (around 2018). 2018 10:45 AM  
OB VISIT with Monica Cain MD  
[address] [place] [address]) Appt Note: 33 Weeks 4 Days - Prenatal Visit [address] [place] [address],Merged with [facility] 3 [address] [place] [address]  
[phone]  
  
   
 [address] [place] [address] 3 Formerly [facility] [phone] Upcoming Health Maintenance Date Due  
 HPV Age 9Y-34Y (1 of 1 - Female 3 Dose Series) 2005 Influenza Age 5 to Adult 2018 PAP AKA CERVICAL CYTOLOGY 2020 DTaP/Tdap/Td series (3 - Td) 2028 Allergies as of 2018  Review Complete On: 2018 By: Nita Gutierrez LPN No Known Allergies Current Immunizations  Reviewed on 2018 Name Date Influenza Vaccine PF 2014  2:32 PM  
 Rho(D) Immune Globulin - IM 2018 Tdap 2018, 2014  2:30 PM  
  
 Not reviewed this visit You Were Diagnosed With   
  
 Codes Comments Tachycardia    -  Primary ICD-10-CM: R00.0 ICD-9-CM: 785.0 Encounter for supervision of other normal pregnancy in third trimester     ICD-10-CM: Z34.83 ICD-9-CM: V22.1 Vitals BP Pulse Temp Resp Height(growth percentile) Weight(growth percentile) 118/81 (BP 1 Location: Left arm, BP Patient Position: Sitting) 96 98.2 °F (36.8 °C) (Oral) 18 5' 4\" (1.626 m) 172 lb 6.4 oz (78.2 kg) LMP SpO2 BMI OB Status Smoking Status 10/21/2017 (Exact Date) 96% 29.59 kg/m2 Pregnant Former Smoker Vitals History BMI and BSA Data  Body Mass Index Body Surface Area  
 29.59 kg/m 2 1.88 m 2  
  
  
 Preferred Pharmacy Pharmacy Name Phone [id number] [place], [address] AT [facility] [phone] Your Updated Medication List  
  
   
This list is accurate as of 6/8/18 11:40 AM.  Always use your most recent med list.  
  
  
  
  
 doxylamine succinate 25 mg tablet Commonly known as:  Darryn  Take 1 Tab by mouth nightly as needed for Sleep.  
  
 prenatal vit-calcium-iron-fa 27 mg iron- 1 mg Tab Commonly known as:  PRENATAL PLUS with CALCIUM Take 1 Tab by mouth daily. pyridoxine (vitamin B6) 50 mg tablet Commonly known as:  VITAMIN B-6 Take 1 Tab by mouth three (3) times daily as needed. We Performed the Following AMB POC EKG ROUTINE W/ 12 LEADS, INTER & REP [08308 CPT(R)] AMB POC URINALYSIS DIP STICK AUTO W/O MICRO [10739 CPT(R)] Follow-up Instructions Return in about 2 weeks (around 6/22/2018). Patient Instructions Weeks 32 to 34 of Your Pregnancy: Care Instructions Your Care Instructions During the last few weeks of your pregnancy, you may have more aches and pains. It's important to rest when you can. Your growing baby is putting more pressure on your bladder. So you may need to urinate more often. Hemorrhoids are also common. These are painful, itchy veins in the rectal area. In the 36th week, most women have a test for group B streptococcus (GBS). GBS is a common bacteria that can live in the vagina and rectum. It can make your baby sick after birth. If you test positive, you will get antibiotics during labor. These will keep your baby from getting the bacteria. You may want to talk with your doctor about banking your baby's umbilical cord blood. This is the blood left in the cord after birth. If you want to save this blood, you must arrange it ahead of time. You can't decide at the last minute.  
If you haven't already had the Tdap shot during this pregnancy, talk to your doctor about getting it. It will help protect your  against pertussis infection. Follow-up care is a key part of your treatment and safety. Be sure to make and go to all appointments, and call your doctor if you are having problems. It's also a good idea to know your test results and keep a list of the medicines you take. How can you care for yourself at home? Ease hemorrhoids · Get more liquids, fruits, vegetables, and fiber in your diet. This will help keep your stools soft. · Avoid sitting for too long. Lie on your left side several times a day. · Clean yourself with soft, moist toilet paper. Or you can use witch hazel pads or personal hygiene pads. · If you are uncomfortable, try ice packs. Or you can sit in a warm sitz bath. Do these for 20 minutes at a time, as needed. · Use hydrocortisone cream for pain and itching. Two examples are Anusol and Preparation H Hydrocortisone. · Ask your doctor about taking an over-the-counter stool softener. Consider breastfeeding · Experts recommend that women breastfeed for 1 year or longer. Breast milk is the perfect food for babies. · Breast milk is easier for babies to digest than formula. And it is always available, just the right temperature, and free. · In general, babies who are  are healthier than formula-fed babies. ¨  babies are less likely to get ear infections, colds, diarrhea, and pneumonia. ¨  babies who are fed only breast milk are less likely to get asthma and allergies. ¨  babies are less likely to be obese. ¨  babies are less likely to get diabetes or heart disease. · Women who breastfeed have less bleeding after the birth. Their uteruses also shrink back faster. · Some women who breastfeed lose weight faster. Making milk burns calories. · Breastfeeding can lower your risk of breast cancer, ovarian cancer, and osteoporosis. Decide about circumcision for boys · As you make this decision, it may help to think about your personal, Yarsanism, and family traditions. You get to decide if you will keep your son's penis natural or if he will be circumcised. · If you decide that you would like to have your baby circumcised, talk with your doctor. You can share your concerns about pain. And you can discuss your preferences for anesthesia. Where can you learn more? Go to http://luis-keron.info/. Enter X110 in the search box to learn more about \"Weeks 32 to 34 of Your Pregnancy: Care Instructions. \" Current as of: March 16, 2017 Content Version: 11.4 © 2198-2033 [employer]. Care instructions adapted under license by [employer] (which disclaims liability or warranty for this information). If you have questions about a medical condition or this instruction, always ask your healthcare professional. [address] any warranty or liability for your use of this information. Introducing [facility]! Dear Danyelle Aguero: 
Thank you for requesting a Collisionable account. Our records indicate that you already have an active Collisionable account. You can access your account anytime at [email or web address] Did you know that you can access your hospital and ER discharge instructions at any time in Collisionable? You can also review all of your test results from your hospital stay or ER visit. Additional Information If you have questions, please visit the Frequently Asked Questions section of the Coll[employer]able website at [email or web address] Remember, Coll[employer]able is NOT to be used for urgent needs. For medical emergencies, dial 911. Now available from your iPhone and Android! Please provide this summary of care documentation to your next provider. Your primary care clinician is listed as David Carreno.  If you have any questions after today's visit, please call [phone].
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
## 2018-06-22 ENCOUNTER — ROUTINE PRENATAL (OUTPATIENT)
Dept: FAMILY MEDICINE CLINIC | Age: 23
End: 2018-06-22

## 2018-06-22 VITALS
OXYGEN SATURATION: 98 % | DIASTOLIC BLOOD PRESSURE: 81 MMHG | HEIGHT: 64 IN | SYSTOLIC BLOOD PRESSURE: 124 MMHG | WEIGHT: 175 LBS | BODY MASS INDEX: 29.88 KG/M2 | RESPIRATION RATE: 18 BRPM | TEMPERATURE: 98.4 F | HEART RATE: 78 BPM

## 2018-06-22 DIAGNOSIS — F19.11 HISTORY OF SUBSTANCE ABUSE (HCC): ICD-10-CM

## 2018-06-22 DIAGNOSIS — Z34.93 PRENATAL CARE IN THIRD TRIMESTER: Primary | ICD-10-CM

## 2018-06-22 LAB
BILIRUB UR QL STRIP: NEGATIVE
GLUCOSE UR-MCNC: NEGATIVE MG/DL
KETONES P FAST UR STRIP-MCNC: NEGATIVE MG/DL
PH UR STRIP: 5.5 [PH] (ref 4.6–8)
PROT UR QL STRIP: NEGATIVE
SP GR UR STRIP: 1.02 (ref 1–1.03)
UA UROBILINOGEN AMB POC: NORMAL (ref 0.2–1)
URINALYSIS CLARITY POC: CLEAR
URINALYSIS COLOR POC: YELLOW
URINE BLOOD POC: NEGATIVE
URINE LEUKOCYTES POC: NORMAL
URINE NITRITES POC: NEGATIVE

## 2018-06-22 NOTE — PROGRESS NOTES
1. Have you been to the ER, urgent care clinic since your last visit? Hospitalized since your last visit? No    2. Have you seen or consulted any other health care providers outside of the [address] [place] [address] since your last visit? Include any pap smears or colon screening. No    Chief Complaint   Patient presents with    Routine Prenatal Visit     33w4d     Patient states no bleeding, no leaking of body fluid, does sometimes have cramping.  + for fetal movement. Blood pressure 131/86, pulse (!) 125, temperature 98.4 °F (36.9 °C), temperature source Oral, resp. rate 18, height 5' 4\" (1.626 m), weight 175 lb (79.4 kg), last menstrual period 10/21/2017, SpO2 98 %.
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
## 2018-06-22 NOTE — PATIENT INSTRUCTIONS

## 2018-06-22 NOTE — MR AVS SNAPSHOT
[address] 
[phone] Patient: Adrianna Hong MRN: [id number] :1995 Visit Information Date & Time Provider Department Dept. Phone Encounter #  
 2018 10:45 AM Alisha Hoffman MD 1000 [facility] [phone] [id number]  
  
 2018  8:40 AM  
OB VISIT with Abbe Epstein MD  
[address] [place] [address] ([address] [place] [address] Road) Appt Note: Per Dr. Liss Brooke. [address] [place] [address]  
[phone]  
  
   
 9250 [facility] [phone] Upcoming Health Maintenance Date Due  
 HPV Age 9Y-34Y (1 of 1 - Female 3 Dose Series) 2005 Influenza Age 5 to Adult 2018 PAP AKA CERVICAL CYTOLOGY 2020 DTaP/Tdap/Td series (3 - Td) 2028 Allergies as of 2018  Review Complete On: 2018 By: Marissa Alvarado LPN No Known Allergies Current Immunizations  Reviewed on 2018 Name Date Influenza Vaccine PF 2014  2:32 PM  
 Rho(D) Immune Globulin - IM 2018 Tdap 2018, 2014  2:30 PM  
  
 Reviewed by Marissa Alvarado LPN on 5842 at 53:39 AM  
You Were Diagnosed With   
  
 Codes Comments Prenatal care in third trimester    -  Primary ICD-10-CM: Z34.93 
ICD-9-CM: V22.1 History of substance abuse     ICD-10-CM: Z87.898 ICD-9-CM: V13.89 Vitals BP Pulse Temp Resp Height(growth percentile) Weight(growth percentile) 124/81 (BP 1 Location: Right arm, BP Patient Position: Sitting) 78 98.4 °F (36.9 °C) (Oral) 18 5' 4\" (1.626 m) 175 lb (79.4 kg) LMP SpO2 BMI OB Status Smoking Status 10/21/2017 (Exact Date) 98% 30.04 kg/m2 Pregnant Former Smoker Vitals History BMI and BSA Data Body Mass Index Body Surface Area 30.04 kg/m 2 1.89 m 2 Preferred Pharmacy Pharmacy Name Phone [facility]Y #[id number]- [address] AT [facility] [phone] Your Updated Medication List  
  
   
This list is accurate as of 18 11:11 AM.  Always use your most recent med list.  
  
  
  
  
 doxylamine succinate 25 mg tablet Commonly known as:  Gabo Matte Take 1 Tab by mouth nightly as needed for Sleep.  
  
 prenatal vit-calcium-iron-fa 27 mg iron- 1 mg Tab Commonly known as:  PRENATAL PLUS with CALCIUM Take 1 Tab by mouth daily. pyridoxine (vitamin B6) 50 mg tablet Commonly known as:  VITAMIN B-6 Take 1 Tab by mouth three (3) times daily as needed. We Performed the Following 10-PANEL URINE DRUG SCREEN [[id number] Custom] AMB POC URINALYSIS DIP STICK AUTO W/O MICRO [67482 CPT(R)] Patient Instructions Weeks 32 to 34 of Your Pregnancy: Care Instructions Your Care Instructions During the last few weeks of your pregnancy, you may have more aches and pains. It's important to rest when you can. Your growing baby is putting more pressure on your bladder. So you may need to urinate more often. Hemorrhoids are also common. These are painful, itchy veins in the rectal area. In the 36th week, most women have a test for group B streptococcus (GBS). GBS is a common bacteria that can live in the vagina and rectum. It can make your baby sick after birth. If you test positive, you will get antibiotics during labor. These will keep your baby from getting the bacteria. You may want to talk with your doctor about banking your baby's umbilical cord blood. This is the blood left in the cord after birth. If you want to save this blood, you must arrange it ahead of time. You can't decide at the last minute. If you haven't already had the Tdap shot during this pregnancy, talk to your doctor about getting it. It will help protect your  against pertussis infection. Follow-up care is a key part of your treatment and safety. Be sure to make and go to all appointments, and call your doctor if you are having problems. It's also a good idea to know your test results and keep a list of the medicines you take. How can you care for yourself at home? Ease hemorrhoids · Get more liquids, fruits, vegetables, and fiber in your diet. This will help keep your stools soft. · Avoid sitting for too long. Lie on your left side several times a day. · Clean yourself with soft, moist toilet paper. Or you can use witch hazel pads or personal hygiene pads. · If you are uncomfortable, try ice packs. Or you can sit in a warm sitz bath. Do these for 20 minutes at a time, as needed. · Use hydrocortisone cream for pain and itching. Two examples are Anusol and Preparation H Hydrocortisone. · Ask your doctor about taking an over-the-counter stool softener. Consider breastfeeding · Experts recommend that women breastfeed for 1 year or longer. Breast milk is the perfect food for babies. · Breast milk is easier for babies to digest than formula. And it is always available, just the right temperature, and free. · In general, babies who are  are healthier than formula-fed babies. ¨  babies are less likely to get ear infections, colds, diarrhea, and pneumonia. ¨  babies who are fed only breast milk are less likely to get asthma and allergies. ¨  babies are less likely to be obese. ¨  babies are less likely to get diabetes or heart disease. · Women who breastfeed have less bleeding after the birth. Their uteruses also shrink back faster. · Some women who breastfeed lose weight faster. Making milk burns calories. · Breastfeeding can lower your risk of breast cancer, ovarian cancer, and osteoporosis. Decide about circumcision for boys · As you make this decision, it may help to think about your personal, Quaker, and family traditions. You get to decide if you will keep your son's penis natural or if he will be circumcised. · If you decide that you would like to have your baby circumcised, talk with your doctor. You can share your concerns about pain. And you can discuss your preferences for anesthesia. Where can you learn more? Go to http://luis-keron.info/. Enter G840 in the search box to learn more about \"Weeks 32 to 34 of Your Pregnancy: Care Instructions. \" Current as of: March 16, 2017 Content Version: 11.4 © 4738-7222 Good Eggs. Care instructions adapted under license by dbTwang (which disclaims liability or warranty for this information). If you have questions about a medical condition or this instruction, always ask your healthcare professional. [address] any warranty or liability for your use of this information. Introducing [facility] & HEALTH SERVICES! Dear Joe Eid: 
Thank you for requesting a Cybernet Software Systems account. Our records indicate that you already have an active Cybernet Software Systems account. You can access your account anytime at [email or web address] Did you know that you can access your hospital and ER discharge instructions at any time in Cybernet Software Systems? You can also review all of your test results from your hospital stay or ER visit. Additional Information If you have questions, please visit the Frequently Asked Questions section of the Cybernet Software Systems website at [email or web address] Remember, Cybernet Software Systems is NOT to be used for urgent needs. For medical emergencies, dial 911. Now available from your iPhone and Android! Please provide this summary of care documentation to your next provider. Your primary care clinician is listed as Nannette Christine. If you have any questions after today's visit, please call [phone].

## 2018-06-23 ENCOUNTER — HOSPITAL ENCOUNTER (EMERGENCY)
Age: 23
Discharge: HOME | End: 2018-06-24
Attending: FAMILY MEDICINE | Admitting: OBSTETRICS & GYNECOLOGY
Payer: COMMERCIAL

## 2018-06-23 LAB
A1 MICROGLOB PLACENTAL VAG QL: NEGATIVE
AMPHET UR QL SCN: NEGATIVE
AMPHETAMINES UR QL SCN: NEGATIVE NG/ML
APPEARANCE UR: CLEAR
BACTERIA URNS QL MICRO: NEGATIVE /HPF
BARBITURATES UR QL SCN: NEGATIVE
BARBITURATES UR QL SCN: NEGATIVE NG/ML
BENZODIAZ UR QL: NEGATIVE
BENZODIAZ UR QL: NEGATIVE NG/ML
BILIRUB UR QL: NEGATIVE
BZE UR QL: NEGATIVE NG/ML
CANNABINOIDS UR QL SCN: NEGATIVE
CANNABINOIDS UR QL SCN: NEGATIVE NG/ML
COCAINE UR QL SCN: NEGATIVE
COLOR UR: NORMAL
CONTROL LINE PRESENT?: NORMAL
DAILY QC (YES/NO)?: YES
DRUG SCRN COMMENT,DRGCM: NORMAL
EPITH CASTS URNS QL MICRO: NORMAL /LPF
EXPIRATION DATE: NORMAL
GLUCOSE UR STRIP.AUTO-MCNC: NEGATIVE MG/DL
HGB UR QL STRIP: NEGATIVE
HYALINE CASTS URNS QL MICRO: NORMAL /LPF (ref 0–5)
INTERNAL NEGATIVE CONTROL: NORMAL
KETONES UR QL STRIP.AUTO: NEGATIVE MG/DL
KIT LOT NO.: NORMAL
LEUKOCYTE ESTERASE UR QL STRIP.AUTO: NEGATIVE
MDMA UR QL SCN: NEGATIVE NG/ML
METHADONE UR QL SCN: NEGATIVE NG/ML
METHADONE UR QL: NEGATIVE
METHAQUALONE UR QL: NEGATIVE NG/ML
NITRITE UR QL STRIP.AUTO: NEGATIVE
OPIATES UR QL: NEGATIVE
OPIATES UR QL: NEGATIVE NG/ML
PCP UR QL: NEGATIVE
PCP UR QL: NEGATIVE NG/ML
PH UR STRIP: 7 [PH] (ref 5–8)
PH, VAGINAL FLUID: 5 (ref 5–6.1)
PROPOXYPH UR QL SCN: NEGATIVE NG/ML
PROT UR STRIP-MCNC: NEGATIVE MG/DL
RBC #/AREA URNS HPF: NORMAL /HPF (ref 0–5)
SP GR UR REFRACTOMETRY: 1 (ref 1–1.03)
UA: UC IF INDICATED,UAUC: NORMAL
UROBILINOGEN UR QL STRIP.AUTO: 0.2 EU/DL (ref 0.2–1)
WBC URNS QL MICRO: NORMAL /HPF (ref 0–4)

## 2018-06-23 PROCEDURE — 84112 EVAL AMNIOTIC FLUID PROTEIN: CPT | Performed by: OBSTETRICS & GYNECOLOGY

## 2018-06-23 PROCEDURE — 83986 ASSAY PH BODY FLUID NOS: CPT | Performed by: OBSTETRICS & GYNECOLOGY

## 2018-06-23 PROCEDURE — 80307 DRUG TEST PRSMV CHEM ANLYZR: CPT | Performed by: OBSTETRICS & GYNECOLOGY

## 2018-06-23 PROCEDURE — 81001 URINALYSIS AUTO W/SCOPE: CPT | Performed by: OBSTETRICS & GYNECOLOGY

## 2018-06-23 NOTE — IP AVS SNAPSHOT
Dani Bourgeois 
 
 
 [address] [place] [address] 
[phone] Patient: Guilherme Mac MRN: [id number] :1995 A check cholo indicates which time of day the medication should be taken. My Medications ASK your doctor about these medications Instructions Each Dose to Equal  
 Morning Noon Evening Bedtime  
 doxylamine succinate 25 mg tablet Commonly known as:  Teena Cobb Your last dose was: Your next dose is: Take 1 Tab by mouth nightly as needed for Sleep. 25 mg  
    
   
   
   
  
 prenatal vit-calcium-iron-fa 27 mg iron- 1 mg Tab Commonly known as:  PRENATAL PLUS with CALCIUM Your last dose was: Your next dose is: Take 1 Tab by mouth daily. 1 Tab  
    
   
   
   
  
 pyridoxine (vitamin B6) 50 mg tablet Commonly known as:  VITAMIN B-6 Your last dose was: Your next dose is: Take 1 Tab by mouth three (3) times daily as needed.   
 50 mg

## 2018-06-23 NOTE — IP AVS SNAPSHOT
303 [facility] 104 1007 [facility] 
[phone] Patient: Kane Becerra MRN: [id number] :1995 About your hospitalization You were admitted on:  N/A You last received care in the:  [facility] 2 LABOR & DELIVERY You were discharged on:  2018 Why you were hospitalized Your primary diagnosis was:  Not on File Follow-up Information None Your Scheduled Appointments 2018  8:40 AM EDT  
OB VISIT with Abbe Epstein MD  
[address] [place] [address]  
 2143 [facility] 7106 [facility]  
[phone] Discharge Orders None A check cholo indicates which time of day the medication should be taken. My Medications ASK your doctor about these medications Instructions Each Dose to Equal  
 Morning Noon Evening Bedtime  
 doxylamine succinate 25 mg tablet Commonly known as:  Neetu Ginger Your last dose was: Your next dose is: Take 1 Tab by mouth nightly as needed for Sleep. 25 mg  
    
   
   
   
  
 prenatal vit-calcium-iron-fa 27 mg iron- 1 mg Tab Commonly known as:  PRENATAL PLUS with CALCIUM Your last dose was: Your next dose is: Take 1 Tab by mouth daily. 1 Tab  
    
   
   
   
  
 pyridoxine (vitamin B6) 50 mg tablet Commonly known as:  VITAMIN B-6 Your last dose was: Your next dose is: Take 1 Tab by mouth three (3) times daily as needed. 50 mg Discharge Instructions GLGt Activation Thank you for requesting access to Acronis. Please follow the instructions below to securely access and download your online medical record. Acronis allows you to send messages to your doctor, view your test results, renew your prescriptions, schedule appointments, and more. How Do I Sign Up? 1. In your internet browser, go to [email or web address] 
2. Click on the First Time User? Click Here link in the Sign In box. You will be redirect to the New Member Sign Up page. 3. Enter your Ooolala Access Code exactly as it appears below. You will not need to use this code after youve completed the sign-up process. If you do not sign up before the expiration date, you must request a new code. Gamblit Gamingt Access Code: Activation code not generated Current Ooolala Status: Active (This is the date your Gamblit Gamingt access code will ) 4. Enter the last four digits of your Social Security Number (xxxx) and Date of Birth (mm/dd/yyyy) as indicated and click Submit. You will be taken to the next sign-up page. 5. Create a Gamblit Gamingt ID. This will be your Ooolala login ID and cannot be changed, so think of one that is secure and easy to remember. 6. Create a Ooolala password. You can change your password at any time. 7. Enter your Password Reset Question and Answer. This can be used at a later time if you forget your password. 8. Enter your e-mail address. You will receive e-mail notification when new information is available in [address] 9. Click Sign Up. You can now view and download portions of your medical record. 10. Click the Download Summary menu link to download a portable copy of your medical information. Additional Information If you have questions, please visit the Frequently Asked Questions section of the Ooolala website at [email or web address] Remember, Ooolala is NOT to be used for urgent needs. For medical emergencies, dial 911. Weeks 32 to 34 of Your Pregnancy: Care Instructions Your Care Instructions During the last few weeks of your pregnancy, you may have more aches and pains. It's important to rest when you can. Your growing baby is putting more pressure on your bladder. So you may need to urinate more often. Hemorrhoids are also common.  These are painful, itchy veins in the rectal area. In the 36th week, most women have a test for group B streptococcus (GBS). GBS is a common bacteria that can live in the vagina and rectum. It can make your baby sick after birth. If you test positive, you will get antibiotics during labor. These will keep your baby from getting the bacteria. You may want to talk with your doctor about banking your baby's umbilical cord blood. This is the blood left in the cord after birth. If you want to save this blood, you must arrange it ahead of time. You can't decide at the last minute. If you haven't already had the Tdap shot during this pregnancy, talk to your doctor about getting it. It will help protect your  against pertussis infection. Follow-up care is a key part of your treatment and safety. Be sure to make and go to all appointments, and call your doctor if you are having problems. It's also a good idea to know your test results and keep a list of the medicines you take. How can you care for yourself at home? Ease hemorrhoids · Get more liquids, fruits, vegetables, and fiber in your diet. This will help keep your stools soft. · Avoid sitting for too long. Lie on your left side several times a day. · Clean yourself with soft, moist toilet paper. Or you can use witch hazel pads or personal hygiene pads. · If you are uncomfortable, try ice packs. Or you can sit in a warm sitz bath. Do these for 20 minutes at a time, as needed. · Use hydrocortisone cream for pain and itching. Two examples are Anusol and Preparation H Hydrocortisone. · Ask your doctor about taking an over-the-counter stool softener. Consider breastfeeding · Experts recommend that women breastfeed for 1 year or longer. Breast milk is the perfect food for babies. · Breast milk is easier for babies to digest than formula. And it is always available, just the right temperature, and free. · In general, babies who are  are healthier than formula-fed babies. ¨  babies are less likely to get ear infections, colds, diarrhea, and pneumonia. ¨  babies who are fed only breast milk are less likely to get asthma and allergies. ¨  babies are less likely to be obese. ¨  babies are less likely to get diabetes or heart disease. · Women who breastfeed have less bleeding after the birth. Their uteruses also shrink back faster. · Some women who breastfeed lose weight faster. Making milk burns calories. · Breastfeeding can lower your risk of breast cancer, ovarian cancer, and osteoporosis. Decide about circumcision for boys · As you make this decision, it may help to think about your personal, Presybeterian, and family traditions. You get to decide if you will keep your son's penis natural or if he will be circumcised. · If you decide that you would like to have your baby circumcised, talk with your doctor. You can share your concerns about pain. And you can discuss your preferences for anesthesia. Where can you learn more? Go to http://luis-keron.info/. Enter W212 in the search box to learn more about \"Weeks 32 to 34 of Your Pregnancy: Care Instructions. \" Current as of: March 16, 2017 Content Version: 11.4 © 4052-3610 LatinComics. Care instructions adapted under license by Fliplingo (which disclaims liability or warranty for this information). If you have questions about a medical condition or this instruction, always ask your healthcare professional. [place] [address] any warranty or liability for your use of this information. Learning About When to Call Your Doctor During Pregnancy (After 20 Weeks) Your Care Instructions It's common to have concerns about what might be a problem during pregnancy.  Although most pregnant women don't have any serious problems, it's important to know when to call your doctor if you have certain symptoms or signs of labor. These are general suggestions. Your doctor may give you some more information about when to call. When to call your doctor (after 20 weeks) Call 911 anytime you think you may need emergency care. For example, call if: 
· You have severe vaginal bleeding. · You have sudden, severe pain in your belly. · You passed out (lost consciousness). · You have a seizure. · You see or feel the umbilical cord. · You think you are about to deliver your baby and can't make it safely to the hospital. 
Call your doctor now or seek immediate medical care if: 
· You have vaginal bleeding. · You have belly pain. · You have a fever. · You have symptoms of preeclampsia, such as: 
¨ Sudden swelling of your face, hands, or feet. ¨ New vision problems (such as dimness or blurring). ¨ A severe headache. · You have a sudden release of fluid from your vagina. (You think your water broke.) · You think that you may be in labor. This means that you've had at least 4 contractions within 20 minutes or at least 8 contractions in an hour. · You notice that your baby has stopped moving or is moving much less than normal. 
· You have symptoms of a urinary tract infection. These may include: 
¨ Pain or burning when you urinate. ¨ A frequent need to urinate without being able to pass much urine. ¨ Pain in the flank, which is just below the rib cage and above the waist on either side of the back. ¨ Blood in your urine. Watch closely for changes in your health, and be sure to contact your doctor if: 
· You have vaginal discharge that smells bad. · You have skin changes, such as: ¨ A rash. ¨ Itching. ¨ Yellow color to your skin. · You have other concerns about your pregnancy. If you have labor signs at 37 weeks or more If you have signs of labor at 37 weeks or more, your doctor may tell you to call when your labor becomes more active. Symptoms of active labor include: 
· Contractions that are regular. · Contractions that are less than 5 minutes apart. · Contractions that are hard to talk through. Follow-up care is a key part of your treatment and safety. Be sure to make and go to all appointments, and call your doctor if you are having problems. It's also a good idea to know your test results and keep a list of the medicines you take. Where can you learn more? Go to http://luis-keron.info/. Enter  in the search box to learn more about \"Learning About When to Call Your Doctor During Pregnancy (After 20 Weeks). \" 
Current as of: March 16, 2017 Content Version: 11.4 © 8747-8593 Netccm. Care instructions adapted under license by Hunton Oil (which disclaims liability or warranty for this information). If you have questions about a medical condition or this instruction, always ask your healthcare professional. [place] [address] any warranty or liability for your use of this information. Introducing [facility] & HEALTH SERVICES! Dear Oscar Hollins: 
Thank you for requesting a Edustation.me account. Our records indicate that you already have an active Edustation.me account. You can access your account anytime at [email or web address] Did you know that you can access your hospital and ER discharge instructions at any time in Edustation.me? You can also review all of your test results from your hospital stay or ER visit. Additional Information If you have questions, please visit the Frequently Asked Questions section of the Edustation.me website at [email or web address] Remember, Edustation.me is NOT to be used for urgent needs. For medical emergencies, dial 911. Now available from your iPhone and Android! Introducing Yusuf Dale As a [facility] patient, I wanted to make you aware of our electronic visit tool called Yusuf X BODYwhitleyHandUp PBC. Noelle Old 24/7 allows you to connect within minutes with a medical provider 24 hours a day, seven days a week via a mobile device or tablet or logging into a secure website from your computer. You can access Sometrics from anywhere in the [place]. A virtual visit might be right for you when you have a simple condition and feel like you just dont want to get out of bed, or cant get away from work for an appointment, when your regular [facility] provider is not available (evenings, weekends or holidays), or when youre out of town and need minor care. Electronic visits cost only $49 and if the [facility] 24/7 provider determines a prescription is needed to treat your condition, one can be electronically transmitted to a nearby pharmacy*. Please take a moment to enroll today if you have not already done so. The enrollment process is free and takes just a few minutes. To enroll, please download the Noelle Old 24/7 tarun to your tablet or phone, or visit [email or web address] to enroll on your computer. And, as an [address] [place] [address] patient with a Energy Points account, the results of your visits will be scanned into your electronic medical record and your primary care provider will be able to view the scanned results. We urge you to continue to see your regular [facility] provider for your ongoing medical care. And while your primary care provider may not be the one available when you seek a Sometrics virtual visit, the peace of mind you get from getting a real diagnosis real time can be priceless. For more information on Sometrics, view our Frequently Asked Questions (FAQs) at [email or web address]. Sincerely, 
 
Harmeet Garcia MD 
Chief Medical Officer [facility] *:  certain medications cannot be prescribed via Sometrics Unresulted tests-please follow up with your PCP on these results Procedure/Test Authorizing Provider Lionel Cough, URINE Rusty Aguiar MD  
 URINALYSIS W/ REFLEX CULTURE Rusty Aguiar MD  
  
Providers Seen During Your Hospitalization Provider Specialty Primary office phone Marlon Halsted, MD Family Practice [phone] Your Primary Care Physician (PCP) Primary Care Physician Office Phone Office Fax DEONTE MORENO ** None ** ** None ** You are allergic to the following No active allergies Recent Documentation Height Weight BMI OB Status Smoking Status 1.651 m 79.4 kg 29.12 kg/m2 Pregnant Former Smoker Emergency Contacts Name Discharge Info Relation Home Work Mobile [place] DISCHARGE CAREGIVER [3] Mother [14] [phone] [place]aim DISCHARGE CAREGIVER [3] Father [15] [phone] [place]aim  Parent [1] [phone] Patient Belongings The following personal items are in your possession at time of discharge: 
                             
 
  
  
 Please provide this summary of care documentation to your next provider. Signatures-by signing, you are acknowledging that this After Visit Summary has been reviewed with you and you have received a copy. Patient Signature:  ____________________________________________________________ Date:  ____________________________________________________________  
  
Inder Dunn Provider Signature:  ____________________________________________________________ Date:  ____________________________________________________________

## 2018-06-24 VITALS
DIASTOLIC BLOOD PRESSURE: 72 MMHG | HEIGHT: 65 IN | HEART RATE: 118 BPM | OXYGEN SATURATION: 98 % | BODY MASS INDEX: 29.16 KG/M2 | TEMPERATURE: 98.6 F | WEIGHT: 175 LBS | SYSTOLIC BLOOD PRESSURE: 117 MMHG | RESPIRATION RATE: 16 BRPM

## 2018-06-24 PROCEDURE — 99285 EMERGENCY DEPT VISIT HI MDM: CPT

## 2018-06-24 PROCEDURE — 59025 FETAL NON-STRESS TEST: CPT

## 2018-06-24 NOTE — DISCHARGE INSTRUCTIONS
[employer] Activation    Thank you for requesting access to [employer]. Please follow the instructions below to securely access and download your online medical record. [employer] allows you to send messages to your doctor, view your test results, renew your prescriptions, schedule appointments, and more. How Do I Sign Up? 1. In your internet browser, go to [email or web address]  2. Click on the First Time User? Click Here link in the Sign In box. You will be redirect to the New Member Sign Up page. 3. Enter your [employer] Access Code exactly as it appears below. You will not need to use this code after youve completed the sign-up process. If you do not sign up before the expiration date, you must request a new code. [employer] Access Code: Activation code not generated  Current [employer] Status: Active (This is the date your [employer] access code will )    4. Enter the last four digits of your Social Security Number (xxxx) and Date of Birth (mm/dd/yyyy) as indicated and click Submit. You will be taken to the next sign-up page. 5. Create a [employer] ID. This will be your [employer] login ID and cannot be changed, so think of one that is secure and easy to remember. 6. Create a [employer] password. You can change your password at any time. 7. Enter your Password Reset Question and Answer. This can be used at a later time if you forget your password. 8. Enter your e-mail address. You will receive e-mail notification when new information is available in [address] 9. Click Sign Up. You can now view and download portions of your medical record. 10. Click the Download Summary menu link to download a portable copy of your medical information. Additional Information    If you have questions, please visit the Frequently Asked Questions section of the [employer] website at [email or web address] Remember, [employer] is NOT to be used for urgent needs. For medical emergencies, dial 911.            Weeks 32 to 34 of Your Pregnancy: Care Instructions  Your Care Instructions    During the last few weeks of your pregnancy, you may have more aches and pains. It's important to rest when you can. Your growing baby is putting more pressure on your bladder. So you may need to urinate more often. Hemorrhoids are also common. These are painful, itchy veins in the rectal area. In the 36th week, most women have a test for group B streptococcus (GBS). GBS is a common bacteria that can live in the vagina and rectum. It can make your baby sick after birth. If you test positive, you will get antibiotics during labor. These will keep your baby from getting the bacteria. You may want to talk with your doctor about banking your baby's umbilical cord blood. This is the blood left in the cord after birth. If you want to save this blood, you must arrange it ahead of time. You can't decide at the last minute. If you haven't already had the Tdap shot during this pregnancy, talk to your doctor about getting it. It will help protect your  against pertussis infection. Follow-up care is a key part of your treatment and safety. Be sure to make and go to all appointments, and call your doctor if you are having problems. It's also a good idea to know your test results and keep a list of the medicines you take. How can you care for yourself at home? Ease hemorrhoids  · Get more liquids, fruits, vegetables, and fiber in your diet. This will help keep your stools soft. · Avoid sitting for too long. Lie on your left side several times a day. · Clean yourself with soft, moist toilet paper. Or you can use witch hazel pads or personal hygiene pads. · If you are uncomfortable, try ice packs. Or you can sit in a warm sitz bath. Do these for 20 minutes at a time, as needed. · Use hydrocortisone cream for pain and itching. Two examples are Anusol and Preparation H Hydrocortisone.   · Ask your doctor about taking an over-the-counter stool softener. Consider breastfeeding  · Experts recommend that women breastfeed for 1 year or longer. Breast milk is the perfect food for babies. · Breast milk is easier for babies to digest than formula. And it is always available, just the right temperature, and free. · In general, babies who are  are healthier than formula-fed babies. ¨  babies are less likely to get ear infections, colds, diarrhea, and pneumonia. ¨  babies who are fed only breast milk are less likely to get asthma and allergies. ¨  babies are less likely to be obese. ¨  babies are less likely to get diabetes or heart disease. · Women who breastfeed have less bleeding after the birth. Their uteruses also shrink back faster. · Some women who breastfeed lose weight faster. Making milk burns calories. · Breastfeeding can lower your risk of breast cancer, ovarian cancer, and osteoporosis. Decide about circumcision for boys  · As you make this decision, it may help to think about your personal, Congregation, and family traditions. You get to decide if you will keep your son's penis natural or if he will be circumcised. · If you decide that you would like to have your baby circumcised, talk with your doctor. You can share your concerns about pain. And you can discuss your preferences for anesthesia. Where can you learn more? Go to http://luis-keron.info/. Enter B382 in the search box to learn more about \"Weeks 32 to 34 of Your Pregnancy: Care Instructions. \"  Current as of: March 16, 2017  Content Version: 11.4  © 4129-7494 [employer]. Care instructions adapted under license by [email or web address] (which disclaims liability or warranty for this information). If you have questions about a medical condition or this instruction, always ask your healthcare professional. [place] [address] any warranty or liability for your use of this information. Learning About When to Call Your Doctor During Pregnancy (After 20 Weeks)  Your Care Instructions  It's common to have concerns about what might be a problem during pregnancy. Although most pregnant women don't have any serious problems, it's important to know when to call your doctor if you have certain symptoms or signs of labor. These are general suggestions. Your doctor may give you some more information about when to call. When to call your doctor (after 20 weeks)  Call 911 anytime you think you may need emergency care. For example, call if:  · You have severe vaginal bleeding. · You have sudden, severe pain in your belly. · You passed out (lost consciousness). · You have a seizure. · You see or feel the umbilical cord. · You think you are about to deliver your baby and can't make it safely to the hospital.  Call your doctor now or seek immediate medical care if:  · You have vaginal bleeding. · You have belly pain. · You have a fever. · You have symptoms of preeclampsia, such as:  ¨ Sudden swelling of your face, hands, or feet. ¨ New vision problems (such as dimness or blurring). ¨ A severe headache. · You have a sudden release of fluid from your vagina. (You think your water broke.)  · You think that you may be in labor. This means that you've had at least 4 contractions within 20 minutes or at least 8 contractions in an hour. · You notice that your baby has stopped moving or is moving much less than normal.  · You have symptoms of a urinary tract infection. These may include:  ¨ Pain or burning when you urinate. ¨ A frequent need to urinate without being able to pass much urine. ¨ Pain in the flank, which is just below the rib cage and above the waist on either side of the back. ¨ Blood in your urine. Watch closely for changes in your health, and be sure to contact your doctor if:  · You have vaginal discharge that smells bad.   · You have skin changes, such as:  ¨ A rash.  ¨ Itching. ¨ Yellow color to your skin. · You have other concerns about your pregnancy. If you have labor signs at 37 weeks or more  If you have signs of labor at 37 weeks or more, your doctor may tell you to call when your labor becomes more active. Symptoms of active labor include:  · Contractions that are regular. · Contractions that are less than 5 minutes apart. · Contractions that are hard to talk through. Follow-up care is a key part of your treatment and safety. Be sure to make and go to all appointments, and call your doctor if you are having problems. It's also a good idea to know your test results and keep a list of the medicines you take. Where can you learn more? Go to http://luis-keron.info/. Enter  in the search box to learn more about \"Learning About When to Call Your Doctor During Pregnancy (After 20 Weeks). \"  Current as of: March 16, 2017  Content Version: 11.4  © 6270-3812 [employer]. Care instructions adapted under license by [employer] (which disclaims liability or warranty for this information). If you have questions about a medical condition or this instruction, always ask your healthcare professional. [place] [address] any warranty or liability for your use of this information.

## 2018-06-24 NOTE — H&P
History & Physical    Name: Swetha Garcia MRN: [id number]  SSN: [id number]    YOB: 1995  Age: 20 y.o. Sex: female      Subjective:     Reason for Admission:  Pregnancy and leakage of fluid    History of Present Illness: Ms. Yahir Sanchez is a 20 y.o.   female with an estimated gestational age of 32w6d with Estimated Date of Delivery: 18. Patient complains of leakage of fluids since 7:30PM. Pregnancy has been complicated by hx of substance abuse, Rh negative status, hx of Chlamydia in 1st trimester, hx of tachycardia, hx of multiple psych disorders. Patient denies contractions, vaginal bleeding, chest pain, headaches, blurred vision, dysuria, hematuria. Endorses good fetal movement. OB History    Para Term  AB Living   2 1 1   1   SAB TAB Ectopic Molar Multiple Live Births              # Outcome Date GA Lbr Ray/2nd Weight Sex Delivery Anes PTL Lv   2 Current            1 Term 14   6 lb 8 oz (2.948 kg) M VAGINAL DELI         Birth Comments: Delivered at home, no prenatal care        Past Medical History:   Diagnosis Date    Atrial fibrillation ([facility] 2017    Borderline personality disorder     vs. traits thereof    Chlamydia infection affecting pregnancy 2017    Chronic tonsillitis     s/p tonsillectomy     Conduct disorder     History of inadequate prenatal care 3/13/2014    Migraine     [facility]    Oppositional defiant disorder     Substance abuse     mj, alcohol, heroin     Past Surgical History:   Procedure Laterality Date    HX OTHER SURGICAL      HX TONSILLECTOMY       Social History     Occupational History    Not on file. Social History Main Topics    Smoking status: Former Smoker     Packs/day: 0.25     Quit date: 2016    Smokeless tobacco: Never Used    Alcohol use Yes      Comment: socially    Drug use: No      Comment: states no longer doing marijuana.      Sexual activity: Yes Partners: Male     Birth control/ protection: None      Family History   Problem Relation Age of Onset    Anxiety Mother     Depression Mother     Alcohol abuse Mother      still using    Anxiety Father     Lupus Maternal Grandmother     Depression Sister     Depression Sister        No Known Allergies  Prior to Admission medications    Medication Sig Start Date End Date Taking? Authorizing Provider   prenatal vit-calcium-iron-fa (PRENATAL PLUS WITH CALCIUM) 27 mg iron- 1 mg tab Take 1 Tab by mouth daily. 17  Yes Maryuri King MD   doxylamine succinate (UNISOM) 25 mg tablet Take 1 Tab by mouth nightly as needed for Sleep. 17   Maryuri King MD   pyridoxine, vitamin B6, (VITAMIN B-6) 50 mg tablet Take 1 Tab by mouth three (3) times daily as needed. 17   Maryuri King MD        Review of Systems:  Pertinent items are noted in the History of Present Illness. Objective:     Vitals:    Vitals:    18 2120 18   BP: 128/85     Pulse: (!) 130     Resp:  16    Temp:  98.6 °F (37 °C)    Weight:   175 lb (79.4 kg)   Height:   5' 5\" (1.651 m)      Temp (24hrs), Av.6 °F (37 °C), Min:98.6 °F (37 °C), Max:98.6 °F (37 °C)    BP  Min: 128/85  Max: 128/85     Physical Exam:  Heart: Regular rate and rhythm, S1S2 present or without murmur or extra heart sounds  Lung: clear to auscultation throughout lung fields, no wheezes, no rales, no rhonchi and normal respiratory effort  Abdomen: soft, nontender  Lower Extremities: no edema or TTP  Membranes:  Intact  Uterine Activity:  Contractions q8 min  Fetal Heart Rate: 135, mod variability, accels present, no decels    Lab/Data Review:  Recent Results (from the past 24 hour(s))   PH BY NITRAZINE, POC    Collection Time: 18  9:46 PM   Result Value Ref Range    pH-Nitrazine paper 5.0 5.0 - 6.1    Daily QC performed?  Yes    RUPTURE OF FETAL MEMBRANES, POC    Collection Time: 18  9:59 PM   Result Value Ref Range    Rupture of fetal membrane Negative Negative    Control line present? Acceptable     Internal negative control Acceptable     Kit Lot No. [id number]     Expiration date 10-        Assessment and Plan:     Ms. Caroline Carrillo is a 20 y.o.   female pregnancy complicated by hx of substance abuse, Rh negative status, hx of Chlamydia in 1st trimester, hx of tachycardia, hx of multiple psych disorders with an estimated gestational age of 32w6d who is being observed for leakage of fluid.   PNL:  B-, rubella immune, VZV immune, HepB neg, Tpal neg, HIV NR, 1 hr OGTT 133, genetic screening neg  - Nitrazine and Amnisure neg  - UA, UDS  - PO hydrate  - Will continue to monitor FHT    Patient discussed with Dr. Jerod Nick DO  Family Medicine Resident

## 2018-06-24 NOTE — PROGRESS NOTES
Dr Mars Sabillon in room to see pt.  Discharge orders given  0154 Pt off the unit in stable condition

## 2018-06-27 NOTE — PROGRESS NOTES
I reviewed with the resident the medical history and the resident's findings on the physical examination. I discussed with the resident the patient's diagnosis and concur with the plan. 21yo  @ 31.4 by LMP=first tri sono  1. IUP: GTT WNL, anatomy normal, s/p tdap   2. Rh neg: s/p rhogam on 18  3. Hx chlamydia: treated with neg BHAVIK, repeat with GBS  4. Hx UTI: treated and neg BHAVIK  5. Hx substance abuse: UDS's negative in pregnancy, checking Q trimester and at time of labor   6. Psych disorder: will need close f/u for PP depression screening   7.   Hx tachycardia: per pt's cardiologist this is due to her hx of alcohol abuse (Pt reports a hx of Afib which cardiology denies)

## 2018-06-27 NOTE — PROGRESS NOTES
I reviewed with the resident the medical history and the resident's findings on the physical examination.   I discussed with the resident the patient's diagnosis and concur with the plan.     21yo  @ 33.4 by LMP=first tri sono  1.  IUP: GTT WNL, anatomy normal, s/p tdap   2.  Rh neg: s/p rhogam on 18  3.  Hx chlamydia: treated with neg BHAVIK, repeat with GBS  4.  Hx UTI: treated and neg BHAVIK  5.  Hx substance abuse: UDS's negative in pregnancy, checking Q trimester and at time of labor   6.  Psych disorder: will need close f/u for PP depression screening   7.  Hx tachycardia: per pt's cardiologist this is due to her hx of alcohol abuse (Pt reports a hx of Afib which cardiology denies)

## 2018-07-03 ENCOUNTER — ROUTINE PRENATAL (OUTPATIENT)
Dept: FAMILY MEDICINE CLINIC | Age: 23
End: 2018-07-03

## 2018-07-03 VITALS
RESPIRATION RATE: 18 BRPM | WEIGHT: 177 LBS | DIASTOLIC BLOOD PRESSURE: 78 MMHG | OXYGEN SATURATION: 98 % | TEMPERATURE: 98.9 F | HEART RATE: 96 BPM | HEIGHT: 65 IN | BODY MASS INDEX: 29.49 KG/M2 | SYSTOLIC BLOOD PRESSURE: 113 MMHG

## 2018-07-03 DIAGNOSIS — Z3A.35 35 WEEKS GESTATION OF PREGNANCY: Primary | ICD-10-CM

## 2018-07-03 LAB
BILIRUB UR QL STRIP: NEGATIVE
GLUCOSE UR-MCNC: NEGATIVE MG/DL
KETONES P FAST UR STRIP-MCNC: NEGATIVE MG/DL
PH UR STRIP: 7 [PH] (ref 4.6–8)
PROT UR QL STRIP: NORMAL
SP GR UR STRIP: 1.02 (ref 1–1.03)
UA UROBILINOGEN AMB POC: NORMAL (ref 0.2–1)
URINALYSIS CLARITY POC: CLEAR
URINALYSIS COLOR POC: YELLOW
URINE BLOOD POC: NEGATIVE
URINE LEUKOCYTES POC: NORMAL
URINE NITRITES POC: NEGATIVE

## 2018-07-03 NOTE — PATIENT INSTRUCTIONS
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
## 2018-07-03 NOTE — PROGRESS NOTES
Chief Complaint   Patient presents with    Routine Prenatal Visit     35w1d     1. Have you been to the ER, urgent care clinic since your last visit? Hospitalized since your last visit? No    2. Have you seen or consulted any other health care providers outside of the [address] [place] [address] since your last visit? Include any pap smears or colon screening.  No

## 2018-07-03 NOTE — MR AVS SNAPSHOT
[address] 
[phone] Patient: Sorin Early MRN: [id number] :1995 Visit Information Date & Time Provider Department Dept. Phone Encounter #  
 7/3/2018  3:15 PM Rene Reed MD 15[facility] [phone] [id number] 7/10/2018  1:30 PM  
OB VISIT with Rene Reed MD  
[address]) Appt Note: 36 weeks [address] [place] [address]  
[phone]  
  
   
 [address] [place] [id number] Upcoming Health Maintenance Date Due  
 HPV Age 9Y-34Y (1 of 1 - Female 3 Dose Series) 2005 Influenza Age 5 to Adult 2018 PAP AKA CERVICAL CYTOLOGY 2020 DTaP/Tdap/Td series (3 - Td) 2028 Allergies as of 7/3/2018  Review Complete On: 2018 By: Derrick Graves RN No Known Allergies Current Immunizations  Reviewed on 2018 Name Date Influenza Vaccine PF 2014  2:32 PM  
 Rho(D) Immune Globulin - IM 2018 Tdap 2018, 2014  2:30 PM  
  
 Not reviewed this visit You Were Diagnosed With   
  
 Codes Comments 35 weeks gestation of pregnancy    -  Primary ICD-10-CM: Z3A.35 
ICD-9-CM: V22.2 Vitals BP Pulse Temp Resp Height(growth percentile) Weight(growth percentile) 113/78 (BP 1 Location: Right arm, BP Patient Position: Sitting) 96 98.9 °F (37.2 °C) (Oral) 18 5' 5\" (1.651 m) 177 lb (80.3 kg) LMP SpO2 BMI OB Status Smoking Status 10/21/2017 (Exact Date) 98% 29.45 kg/m2 Pregnant Former Smoker Vitals History BMI and BSA Data Body Mass Index Body Surface Area  
 29.45 kg/m 2 1.92 m 2 Preferred Pharmacy Pharmacy Name Phone [id number] [place] [address] AT [facility] [phone] Your Updated Medication List  
  
   
 This list is accurate as of 7/3/18  4:19 PM.  Always use your most recent med list.  
  
  
  
  
 doxylamine succinate 25 mg tablet Commonly known as:  Morelia Handler Take 1 Tab by mouth nightly as needed for Sleep.  
  
 prenatal vit-calcium-iron-fa 27 mg iron- 1 mg Tab Commonly known as:  PRENATAL PLUS with CALCIUM Take 1 Tab by mouth daily. pyridoxine (vitamin B6) 50 mg tablet Commonly known as:  VITAMIN B-6 Take 1 Tab by mouth three (3) times daily as needed. We Performed the Following AMB POC URINALYSIS DIP STICK AUTO W/O MICRO [28089 CPT(R)] CHLAMYDIA/GC PCR [82188 CPT(R)] GROUP B STREP BY EDUARDO [[id number] Custom] Patient Instructions Weeks 34 to 36 of Your Pregnancy: Care Instructions Your Care Instructions By now, your baby and your belly have grown quite large. It is almost time to give birth. A full-term pregnancy can deliver between 37 and 42 weeks. Your baby's lungs are almost ready to breathe air. The bones in your baby's head are now firm enough to protect it, but soft enough to move down through the birth canal. 
You may feel excited, happy, anxious, or scared. You may wonder how you will know if you are in labor or what to expect during labor. Try to be flexible in your expectations of the birth. Because each birth is different, there is no way to know exactly what childbirth will be like for you. This care sheet will help you know what to expect and how to prepare. This may make your childbirth easier. If you haven't already had the Tdap shot during this pregnancy, talk to your doctor about getting it. It will help protect your  against pertussis infection. In the 36th week, most women have a test for group B streptococcus (GBS). GBS is a common bacteria that can live in the vagina and rectum. It can make your baby sick after birth. If you test positive, you will get antibiotics during labor.  The medicine will keep your baby from getting the bacteria. Follow-up care is a key part of your treatment and safety. Be sure to make and go to all appointments, and call your doctor if you are having problems. It's also a good idea to know your test results and keep a list of the medicines you take. How can you care for yourself at home? Learn about pain relief choices · Pain is different for every woman. Talk with your doctor about your feelings about pain. · You can choose from several types of pain relief. These include medicine or breathing techniques, as well as comfort measures. You can use more than one option. · If you choose to have pain medicine during labor, talk to your doctor about your options. Some medicines lower anxiety and help with some of the pain. Others make your lower body numb so that you won't feel pain. · Be sure to tell your doctor about your pain medicine choice before you start labor or very early in your labor. You may be able to change your mind as labor progresses. · Rarely, a woman is put to sleep by medicine given through a mask or an IV. Labor and delivery · The first stage of labor has three parts: early, active, and transition. ¨ Most women have early labor at home. You can stay busy or rest, eat light snacks, drink clear fluids, and start counting contractions. ¨ When talking during a contraction gets hard, you may be moving to active labor. During active labor, you should head for the hospital if you are not there already. ¨ You are in active labor when contractions come every 3 to 4 minutes and last about 60 seconds. Your cervix is opening more rapidly. ¨ If your water breaks, contractions will come faster and stronger. ¨ During transition, your cervix is stretching, and contractions are coming more rapidly. ¨ You may want to push, but your cervix might not be ready. Your doctor will tell you when to push. · The second stage starts when your cervix is completely opened and you are ready to push. ¨ Contractions are very strong to push the baby down the birth canal. 
¨ You will feel the urge to push. You may feel like you need to have a bowel movement. ¨ You may be coached to push with contractions. These contractions will be very strong, but you will not have them as often. You can get a little rest between contractions. ¨ You may be emotional and irritable. You may not be aware of what is going on around you. ¨ One last push, and your baby is born. · The third stage is when a few more contractions push out the placenta. This may take 30 minutes or less. · The fourth stage is the welcome recovery. You may feel overwhelmed with emotions and exhausted but alert. This is a good time to start breastfeeding. Where can you learn more? Go to http://luis-keron.info/. Enter W954 in the search box to learn more about \"Weeks 34 to 36 of Your Pregnancy: Care Instructions. \" Current as of: March 16, 2017 Content Version: 11.4 © 6606-8653 [employer]. Care instructions adapted under license by CloudAccess (which disclaims liability or warranty for this information). If you have questions about a medical condition or this instruction, always ask your healthcare professional. [place] [address] any warranty or liability for your use of this information. Introducing [facility]! Dear Sanjeev Navarro: 
Thank you for requesting a Factery account. Our records indicate that you already have an active Factery account. You can access your account anytime at [email or web address] Did you know that you can access your hospital and ER discharge instructions at any time in Factery? You can also review all of your test results from your hospital stay or ER visit. Additional Information If you have questions, please visit the Frequently Asked Questions section of the Factery website at [email or web address] Remember, MyChart is NOT to be used for urgent needs. For medical emergencies, dial 911. Now available from your iPhone and Android! Please provide this summary of care documentation to your next provider. Your primary care clinician is listed as Lissette Rocha. If you have any questions after today's visit, please call [phone].

## 2018-07-04 LAB
C TRACH RRNA SPEC QL NAA+PROBE: NEGATIVE
N GONORRHOEA RRNA SPEC QL NAA+PROBE: NEGATIVE

## 2018-07-05 LAB — GP B STREP DNA SPEC QL NAA+PROBE: NEGATIVE

## 2018-07-10 ENCOUNTER — ROUTINE PRENATAL (OUTPATIENT)
Dept: FAMILY MEDICINE CLINIC | Age: 23
End: 2018-07-10

## 2018-07-10 VITALS
SYSTOLIC BLOOD PRESSURE: 116 MMHG | DIASTOLIC BLOOD PRESSURE: 82 MMHG | RESPIRATION RATE: 18 BRPM | OXYGEN SATURATION: 97 % | BODY MASS INDEX: 29.42 KG/M2 | HEART RATE: 94 BPM | TEMPERATURE: 98.1 F | WEIGHT: 176.6 LBS | HEIGHT: 65 IN

## 2018-07-10 DIAGNOSIS — Z34.83 ENCOUNTER FOR SUPERVISION OF OTHER NORMAL PREGNANCY IN THIRD TRIMESTER: Primary | ICD-10-CM

## 2018-07-10 LAB
BILIRUB UR QL STRIP: NORMAL
GLUCOSE UR-MCNC: NEGATIVE MG/DL
KETONES P FAST UR STRIP-MCNC: NEGATIVE MG/DL
PH UR STRIP: 6 [PH] (ref 4.6–8)
PROT UR QL STRIP: NORMAL
SP GR UR STRIP: 1.03 (ref 1–1.03)
UA UROBILINOGEN AMB POC: NORMAL (ref 0.2–1)
URINALYSIS CLARITY POC: NORMAL
URINALYSIS COLOR POC: YELLOW
URINE BLOOD POC: NEGATIVE
URINE LEUKOCYTES POC: NEGATIVE
URINE NITRITES POC: NEGATIVE

## 2018-07-10 NOTE — MR AVS SNAPSHOT
[address] 
[phone] Patient: Mali Skinner MRN: [id number] :1995 Visit Information Date & Time Provider Department Dept. Phone Encounter #  
 7/10/2018  1:30 PM Doc Noble MD [facility] [phone] [id number] Follow-up Instructions Return in about 1 week (around 2018). 2018  1:45 PM  
OB VISIT with Doc Noble MD  
[address] [place] [address] [facility]: 40 Week Prenatal Visit [address] [employer] [address]  
[phone]  
  
   
 [facility] [address] Upcoming Health Maintenance Date Due  
 HPV Age 9Y-34Y (1 of 1 - Female 3 Dose Series) 2005 Influenza Age 5 to Adult 2018 PAP AKA CERVICAL CYTOLOGY 2020 DTaP/Tdap/Td series (3 - Td) 2028 Allergies as of 7/10/2018  Review Complete On: 7/10/2018 By: Brad Cain LPN No Known Allergies Current Immunizations  Reviewed on 2018 Name Date Influenza Vaccine PF 2014  2:32 PM  
 Rho(D) Immune Globulin - IM 2018 Tdap 2018, 2014  2:30 PM  
  
 Not reviewed this visit You Were Diagnosed With   
  
 Codes Comments Encounter for supervision of other normal pregnancy in third trimester    -  Primary ICD-10-CM: Z34.83 ICD-9-CM: V22.1 Vitals BP Pulse Temp Resp Height(growth percentile) Weight(growth percentile) 116/82 (BP 1 Location: Right arm, BP Patient Position: Sitting) 94 98.1 °F (36.7 °C) (Oral) 18 5' 5\" (1.651 m) 176 lb 9.6 oz (80.1 kg) LMP SpO2 BMI OB Status Smoking Status 10/21/2017 (Exact Date) 97% 29.39 kg/m2 Pregnant Former Smoker Vitals History BMI and BSA Data Body Mass Index Body Surface Area  
 29.39 kg/m 2 1.92 m 2 Preferred Pharmacy Pharmacy Name Phone [id number] [address] AT [facility] [phone] Your Updated Medication List  
  
   
This list is accurate as of 7/10/18  1:44 PM.  Always use your most recent med list.  
  
  
  
  
 doxylamine succinate 25 mg tablet Commonly known as:  Jordon Deleon Take 1 Tab by mouth nightly as needed for Sleep.  
  
 prenatal vit-calcium-iron-fa 27 mg iron- 1 mg Tab Commonly known as:  PRENATAL PLUS with CALCIUM Take 1 Tab by mouth daily. pyridoxine (vitamin B6) 50 mg tablet Commonly known as:  VITAMIN B-6 Take 1 Tab by mouth three (3) times daily as needed. We Performed the Following AMB POC URINALYSIS DIP STICK AUTO W/O MICRO [91208 CPT(R)] Follow-up Instructions Return in about 1 week (around 7/17/2018). Patient Instructions Week 37 of Your Pregnancy: Care Instructions Your Care Instructions You are near the end of your pregnancy-and you're probably pretty uncomfortable. It may be harder to walk around. Lying down probably isn't comfortable either. You may have trouble getting to sleep or staying asleep. Most women deliver their babies between 40 and 41 weeks. This is a good time to think about packing a bag for the hospital with items you'll need. Then you'll be ready when labor starts. Follow-up care is a key part of your treatment and safety. Be sure to make and go to all appointments, and call your doctor if you are having problems. It's also a good idea to know your test results and keep a list of the medicines you take. How can you care for yourself at home? Learn about breastfeeding · Breastfeeding is best for your baby and good for you. · Breast milk has antibodies to help your baby fight infections. · Mothers who breastfeed often lose weight faster, because making milk burns calories. · Learning the best ways to hold your baby will make breastfeeding easier. · Let your partner bathe and diaper the baby to keep your partner from feeling left out. Snuggle together when you breastfeed. · You may want to learn how to use a breast pump and store your milk. · If you choose to bottle feed, make the feeding feel like breastfeeding so you can bond with your baby. Always hold your baby and the bottle. Do not prop bottles or let your baby fall asleep with a bottle. Learn about crying · It is common for babies to cry for 1 to 3 hours a day. Some cry more, some cry less. · Babies don't cry to make you upset or because you are a bad parent. · Crying is how your baby communicates. Your baby may be hungry; have gas; need a diaper change; or feel cold, warm, tired, lonely, or tense. Sometimes babies cry for unknown reasons. · If you respond to your baby's needs, he or she will learn to trust you. · Try to stay calm when your baby cries. Your baby may get more upset if he or she senses that you are upset. Know how to care for your  · Your baby's umbilical cord stump will drop off on its own, usually between 1 and 2 weeks. To care for your baby's umbilical cord area: ¨ Clean the area at the bottom of the cord 2 or 3 times a day. ¨ Pay special attention to the area where the cord attaches to the skin. ¨ Keep the diaper folded below the cord. ¨ Use a damp washcloth or cotton ball to sponge bathe your baby until the stump has come off. · Your baby's first dark stool is called meconium. After the meconium is passed, your baby will develop his or her own bowel pattern. ¨ Some babies, especially  babies, have several bowel movements a day. Others have one or two a day, or one every 2 to 3 days. ¨  babies often have loose, yellow stools. Formula-fed babies have more formed stools. ¨ If your baby's stools look like little pellets, he or she is constipated. After 2 days of constipation, call your baby's doctor. · If your baby will be circumcised, you can care for him at home. ¨ Gently rinse his penis with warm water after every diaper change. Do not try to remove the film that forms on the penis. This film will go away on its own. Pat dry. ¨ Put petroleum ointment, such as Vaseline, on the area of the diaper that will touch your baby's penis. This will keep the diaper from sticking to your baby. ¨ Ask the doctor about giving your baby acetaminophen (Tylenol) for pain. Where can you learn more? Go to http://luis-keron.info/. Enter 68 21 [id number] in the search box to learn more about \"Week 37 of Your Pregnancy: Care Instructions. \" Current as of: November 21, 2017 Content Version: 11.7 © 0712-1772 [employer]. Care instructions adapted under license by Aprilage (which disclaims liability or warranty for this information). If you have questions about a medical condition or this instruction, always ask your healthcare professional. [place] [address] any warranty or liability for your use of this information. Introducing [facility]! Dear Rachele Up: 
Thank you for requesting a Skylines account. Our records indicate that you already have an active Skylines account. You can access your account anytime at [email or web address] Did you know that you can access your hospital and ER discharge instructions at any time in Skylines? You can also review all of your test results from your hospital stay or ER visit. Additional Information If you have questions, please visit the Frequently Asked Questions section of the Skylines website at [email or web address] / [email or web address]/. Remember, Skylines is NOT to be used for urgent needs. For medical emergencies, dial 911. Now available from your iPhone and Android! Please provide this summary of care documentation to your next provider. Your primary care clinician is listed as Sajan Nevarez. If you have any questions after today's visit, please call [phone].

## 2018-07-10 NOTE — PROGRESS NOTES
Identified Patient with two Patient identifiers (Name and ). Two Patient Identifiers confirmed. Reviewed record in preparation for visit and have obtained necessary documentation. Chief Complaint   Patient presents with    Routine Prenatal Visit     36 Weeks 1 Day     Patient in the office to for routine prenatal appointment today. Patient denies vaginal discharge, abnormal vaginal spotting/bleeding or fluid leakage. Patient also denies abdominal pain/cramping/discomfort. State she has had irregular contractions. States she is compliant with taking prenatal vitamins as prescribed. No further concerns voiced at this time     Visit Vitals    /82 (BP 1 Location: Right arm, BP Patient Position: Sitting)    Pulse (!) 101    Temp 98.1 °F (36.7 °C) (Oral)    Resp 18    Ht 5' 5\" (1.651 m)    Wt 176 lb 9.6 oz (80.1 kg)    SpO2 97%    BMI 29.39 kg/m2       1. Have you been to the ER, urgent care clinic since your last visit? Hospitalized since your last visit? No    2. Have you seen or consulted any other health care providers outside of the [facility] since your last visit? Include any pap smears or colon screening.  No
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
## 2018-07-10 NOTE — PATIENT INSTRUCTIONS
Week 37 of Your Pregnancy: Care Instructions  Your Care Instructions    You are near the end of your pregnancy-and you're probably pretty uncomfortable. It may be harder to walk around. Lying down probably isn't comfortable either. You may have trouble getting to sleep or staying asleep. Most women deliver their babies between 40 and 41 weeks. This is a good time to think about packing a bag for the hospital with items you'll need. Then you'll be ready when labor starts. Follow-up care is a key part of your treatment and safety. Be sure to make and go to all appointments, and call your doctor if you are having problems. It's also a good idea to know your test results and keep a list of the medicines you take. How can you care for yourself at home? Learn about breastfeeding  · Breastfeeding is best for your baby and good for you. · Breast milk has antibodies to help your baby fight infections. · Mothers who breastfeed often lose weight faster, because making milk burns calories. · Learning the best ways to hold your baby will make breastfeeding easier. · Let your partner bathe and diaper the baby to keep your partner from feeling left out. Snuggle together when you breastfeed. · You may want to learn how to use a breast pump and store your milk. · If you choose to bottle feed, make the feeding feel like breastfeeding so you can bond with your baby. Always hold your baby and the bottle. Do not prop bottles or let your baby fall asleep with a bottle. Learn about crying  · It is common for babies to cry for 1 to 3 hours a day. Some cry more, some cry less. · Babies don't cry to make you upset or because you are a bad parent. · Crying is how your baby communicates. Your baby may be hungry; have gas; need a diaper change; or feel cold, warm, tired, lonely, or tense. Sometimes babies cry for unknown reasons. · If you respond to your baby's needs, he or she will learn to trust you.   · Try to stay calm when your baby cries. Your baby may get more upset if he or she senses that you are upset. Know how to care for your   · Your baby's umbilical cord stump will drop off on its own, usually between 1 and 2 weeks. To care for your baby's umbilical cord area:  ¨ Clean the area at the bottom of the cord 2 or 3 times a day. ¨ Pay special attention to the area where the cord attaches to the skin. ¨ Keep the diaper folded below the cord. ¨ Use a damp washcloth or cotton ball to sponge bathe your baby until the stump has come off. · Your baby's first dark stool is called meconium. After the meconium is passed, your baby will develop his or her own bowel pattern. ¨ Some babies, especially  babies, have several bowel movements a day. Others have one or two a day, or one every 2 to 3 days. ¨  babies often have loose, yellow stools. Formula-fed babies have more formed stools. ¨ If your baby's stools look like little pellets, he or she is constipated. After 2 days of constipation, call your baby's doctor. · If your baby will be circumcised, you can care for him at home. ¨ Gently rinse his penis with warm water after every diaper change. Do not try to remove the film that forms on the penis. This film will go away on its own. Pat dry. ¨ Put petroleum ointment, such as Vaseline, on the area of the diaper that will touch your baby's penis. This will keep the diaper from sticking to your baby. ¨ Ask the doctor about giving your baby acetaminophen (Tylenol) for pain. Where can you learn more? Go to http://luis-keron.info/. Enter 68  97 in the search box to learn more about \"Week 37 of Your Pregnancy: Care Instructions. \"  Current as of: 2017  Content Version: 11.7  © 4918-5405 [employer]. Care instructions adapted under license by [employer]uncher [employer] (which disclaims liability or warranty for this information).  If you have questions about a medical condition or this instruction, always ask your healthcare professional. [place] [address] any warranty or liability for your use of this information.

## 2018-07-13 ENCOUNTER — TELEPHONE (OUTPATIENT)
Dept: FAMILY MEDICINE CLINIC | Age: 23
End: 2018-07-13

## 2018-07-13 ENCOUNTER — HOSPITAL ENCOUNTER (EMERGENCY)
Age: 23
Discharge: HOME | End: 2018-07-13
Attending: FAMILY MEDICINE | Admitting: OBSTETRICS & GYNECOLOGY
Payer: COMMERCIAL

## 2018-07-13 VITALS
HEIGHT: 65 IN | DIASTOLIC BLOOD PRESSURE: 89 MMHG | WEIGHT: 176 LBS | OXYGEN SATURATION: 97 % | TEMPERATURE: 98 F | RESPIRATION RATE: 16 BRPM | HEART RATE: 96 BPM | BODY MASS INDEX: 29.32 KG/M2 | SYSTOLIC BLOOD PRESSURE: 131 MMHG

## 2018-07-13 LAB
AMPHET UR QL SCN: NEGATIVE
APPEARANCE UR: CLEAR
BACTERIA URNS QL MICRO: NEGATIVE /HPF
BARBITURATES UR QL SCN: NEGATIVE
BENZODIAZ UR QL: NEGATIVE
BILIRUB UR QL: NEGATIVE
CANNABINOIDS UR QL SCN: NEGATIVE
COCAINE UR QL SCN: NEGATIVE
COLOR UR: NORMAL
DRUG SCRN COMMENT,DRGCM: NORMAL
EPITH CASTS URNS QL MICRO: NORMAL /LPF
GLUCOSE UR STRIP.AUTO-MCNC: NEGATIVE MG/DL
HGB UR QL STRIP: NEGATIVE
HYALINE CASTS URNS QL MICRO: NORMAL /LPF (ref 0–5)
KETONES UR QL STRIP.AUTO: NEGATIVE MG/DL
LEUKOCYTE ESTERASE UR QL STRIP.AUTO: NEGATIVE
METHADONE UR QL: NEGATIVE
NITRITE UR QL STRIP.AUTO: NEGATIVE
OPIATES UR QL: NEGATIVE
PCP UR QL: NEGATIVE
PH UR STRIP: 6.5 [PH] (ref 5–8)
PROT UR STRIP-MCNC: NEGATIVE MG/DL
RBC #/AREA URNS HPF: NORMAL /HPF (ref 0–5)
SP GR UR REFRACTOMETRY: <1.005 (ref 1–1.03)
UA: UC IF INDICATED,UAUC: NORMAL
UROBILINOGEN UR QL STRIP.AUTO: 0.2 EU/DL (ref 0.2–1)
WBC URNS QL MICRO: NORMAL /HPF (ref 0–4)

## 2018-07-13 PROCEDURE — 80307 DRUG TEST PRSMV CHEM ANLYZR: CPT | Performed by: OBSTETRICS & GYNECOLOGY

## 2018-07-13 PROCEDURE — 99283 EMERGENCY DEPT VISIT LOW MDM: CPT

## 2018-07-13 PROCEDURE — 81001 URINALYSIS AUTO W/SCOPE: CPT | Performed by: OBSTETRICS & GYNECOLOGY

## 2018-07-13 RX ORDER — ACETAMINOPHEN 325 MG/1
650 TABLET ORAL ONCE
Status: DISCONTINUED | OUTPATIENT
Start: 2018-07-13 | End: 2018-07-14 | Stop reason: HOSPADM

## 2018-07-13 NOTE — IP AVS SNAPSHOT
[address] 
[phone] Patient: Ольга Calvillo MRN: [id number] :1995 About your hospitalization You were admitted on:  N/A You last received care in the:  [facility] 2 LABOR & DELIVERY You were discharged on:  2018 Why you were hospitalized Your primary diagnosis was:  Not on File Follow-up Information None Your Scheduled Appointments 2018  1:45 PM EDT  
OB VISIT with Heather Wright MD  
[address] [place] [address]  
 [address]  
[phone] Discharge Orders None A check cholo indicates which time of day the medication should be taken. My Medications ASK your doctor about these medications Instructions Each Dose to Equal  
 Morning Noon Evening Bedtime  
 doxylamine succinate 25 mg tablet Commonly known as:  Muriel Bingham Your last dose was: Your next dose is: Take 1 Tab by mouth nightly as needed for Sleep. 25 mg  
    
   
   
   
  
 prenatal vit-calcium-iron-fa 27 mg iron- 1 mg Tab Commonly known as:  PRENATAL PLUS with CALCIUM Your last dose was: Your next dose is: Take 1 Tab by mouth daily. 1 Tab  
    
   
   
   
  
 pyridoxine (vitamin B6) 50 mg tablet Commonly known as:  VITAMIN B-6 Your last dose was: Your next dose is: Take 1 Tab by mouth three (3) times daily as needed. 50 mg Discharge Instructions Bookyahart Activation Thank you for requesting access to Tilera. Please follow the instructions below to securely access and download your online medical record. Tilera allows you to send messages to your doctor, view your test results, renew your prescriptions, schedule appointments, and more. How Do I Sign Up? 1. In your internet browser, go to [email or web address] 
2. Click on the First Time User? Click Here link in the Sign In box. You will be redirect to the New Member Sign Up page. 3. Enter your Veset Access Code exactly as it appears below. You will not need to use this code after youve completed the sign-up process. If you do not sign up before the expiration date, you must request a new code. DemoHiret Access Code: Activation code not generated Current Veset Status: Active (This is the date your DemoHiret access code will ) 4. Enter the last four digits of your Social Security Number (xxxx) and Date of Birth (mm/dd/yyyy) as indicated and click Submit. You will be taken to the next sign-up page. 5. Create a DemoHiret ID. This will be your Veset login ID and cannot be changed, so think of one that is secure and easy to remember. 6. Create a Veset password. You can change your password at any time. 7. Enter your Password Reset Question and Answer. This can be used at a later time if you forget your password. 8. Enter your e-mail address. You will receive e-mail notification when new information is available in [address] 9. Click Sign Up. You can now view and download portions of your medical record. 10. Click the Download Summary menu link to download a portable copy of your medical information. Additional Information If you have questions, please visit the Frequently Asked Questions section of the Veset website at [email or web address] Remember, Veset is NOT to be used for urgent needs. For medical emergencies, dial 911. Pregnancy Precautions: Care Instructions Your Care Instructions There is no sure way to prevent labor before your due date ( labor) or to prevent most other pregnancy problems. But there are things you can do to increase your chances of a healthy pregnancy.  Go to your appointments, follow your doctor's advice, and take good care of yourself. Eat well, and exercise (if your doctor agrees). And make sure to drink plenty of water. Follow-up care is a key part of your treatment and safety. Be sure to make and go to all appointments, and call your doctor if you are having problems. It's also a good idea to know your test results and keep a list of the medicines you take. How can you care for yourself at home? · Make sure you go to your prenatal appointments. At each visit, your doctor will check your blood pressure. Your doctor will also check to see if you have protein in your urine. High blood pressure and protein in urine are signs of preeclampsia. This condition can be dangerous for you and your baby. · Drink plenty of fluids, enough so that your urine is light yellow or clear like water. Dehydration can cause contractions. If you have kidney, heart, or liver disease and have to limit fluids, talk with your doctor before you increase the amount of fluids you drink. · Tell your doctor right away if you notice any symptoms of an infection, such as: ¨ Burning when you urinate. ¨ A foul-smelling discharge from your vagina. ¨ Vaginal itching. ¨ Unexplained fever. ¨ Unusual pain or soreness in your uterus or lower belly. · Eat a balanced diet. Include plenty of foods that are high in calcium and iron. ¨ Foods high in calcium include milk, cheese, yogurt, almonds, and broccoli. ¨ Foods high in iron include red meat, shellfish, poultry, eggs, beans, raisins, whole-grain bread, and leafy green vegetables. · Do not smoke. If you need help quitting, talk to your doctor about stop-smoking programs and medicines. These can increase your chances of quitting for good. · Do not drink alcohol or use illegal drugs. · Follow your doctor's directions about activity. Your doctor will let you know how much, if any, exercise you can do. · Ask your doctor if you can have sex. If you are at risk for early labor, your doctor may ask you to not have sex. · Take care to prevent falls. During pregnancy, your joints are loose, and your balance is off. Sports such as bicycling, skiing, or in-line skating can increase your risk of falling. And don't ride horses or motorcycles, dive, water ski, scuba dive, or parachute jump while you are pregnant. · Avoid getting very hot. Do not use saunas or hot tubs. Avoid staying out in the sun in hot weather for long periods. Take acetaminophen (Tylenol) to lower a high fever. · Do not take any over-the-counter or herbal medicines or supplements without talking to your doctor or pharmacist first. 
When should you call for help? Call 911 anytime you think you may need emergency care. For example, call if: 
  · You passed out (lost consciousness).  
  · You have severe vaginal bleeding.  
  · You have severe pain in your belly or pelvis.  
  · You have had fluid gushing or leaking from your vagina and you know or think the umbilical cord is bulging into your vagina. If this happens, immediately get down on your knees so your rear end (buttocks) is higher than your head. This will decrease the pressure on the cord until help arrives.  
[facility] your doctor now or seek immediate medical care if: 
  · You have signs of preeclampsia, such as: 
¨ Sudden swelling of your face, hands, or feet. ¨ New vision problems (such as dimness or blurring). ¨ A severe headache.  
  · You have any vaginal bleeding.  
  · You have belly pain or cramping.  
  · You have a fever.  
  · You have had regular contractions (with or without pain) for an hour. This means that you have 8 or more within 1 hour or 4 or more in 20 minutes after you change your position and drink fluids.  
  · You have a sudden release of fluid from your vagina.  
  · You have low back pain or pelvic pressure that does not go away.   · You notice that your baby has stopped moving or is moving much less than normal.  
 Watch closely for changes in your health, and be sure to contact your doctor if you have any problems. Where can you learn more? Go to http://luis-keron.info/. Enter [id number] in the search box to learn more about \"Pregnancy Precautions: Care Instructions. \" Current as of: November 21, 2017 Content Version: 11.7 © 2513-3512 [employer]. Care instructions adapted under license by Flash [employer]et (which disclaims liability or warranty for this information). If you have questions about a medical condition or this instruction, always ask your healthcare professional. [address] any warranty or liability for your use of this information. Introducing \[facility] [place] [facility]\"" & HEALTH SERVICES! Dear Krissy Raymond: 
Thank you for requesting a FORMTEK account. Our records indicate that you already have an active FORMTEK account. You can access your account anytime at [email or web address] Like [email or web address] Waitress Did you know that you can access your hospital and ER discharge instructions at any time in FORMTEK? You can also review all of your test results from your hospital stay or ER visit. Additional Information If you have questions, please visit the Frequently Asked Questions section of the FORMTEK website at [email or web address] Remember, FORMTEK is NOT to be used for urgent needs. For medical emergencies, dial 911. Now available from your iPhone and Android! Introducing Yusuf Dale As a [employer] patient, I wanted to make you aware of our electronic visit tool called Yusuf Dale. [employer] 24/7 allows you to connect within minutes with a medical provider 24 hours a day, seven days a week via a mobile device or tablet or logging into a secure website from your computer.   You can access [facility] [email or web address] 24/7 from anywhere in the [place]. A virtual visit might be right for you when you have a simple condition and feel like you just dont want to get out of bed, or cant get away from work for an appointment, when your regular Chrissy Sis provider is not available (evenings, weekends or holidays), or when youre out of town and need minor care. Electronic visits cost only $49 and if the Selfie.[email or web address] 24/7 provider determines a prescription is needed to treat your condition, one can be electronically transmitted to a nearby pharmacy*. Please take a moment to enroll today if you have not already done so. The enrollment process is free and takes just a few minutes. To enroll, please download the Selfie.[email or web address] 24/7 tarun to your tablet or phone, or visit [email or web address] to enroll on your computer. And, as an [address] [place] [address] patient with a Upower account, the results of your visits will be scanned into your electronic medical record and your primary care provider will be able to view the scanned results. We urge you to continue to see your regular Chrissy Sis provider for your ongoing medical care. And while your primary care provider may not be the one available when you seek a Banksnob virtual visit, the peace of mind you get from getting a real diagnosis real time can be priceless. For more information on Yusuf Actus Digitalwhitleyfin, view our Frequently Asked Questions (FAQs) at [email or web address]. Sincerely, 
 
Tennille Martinez MD 
Chief Medical Officer [facility] Davina Zurita *:  certain medications cannot be prescribed via Tempeestwhitleyfin Providers Seen During Your Hospitalization Provider Specialty Primary office phone Jareth Fraga MD Family Practice [phone] Your Primary Care Physician (PCP) Primary Care Physician Office Phone Office Fax  DEONTE MORENO ** None ** ** None **  
  
 You are allergic to the following No active allergies Recent Documentation Height Weight BMI OB Status Smoking Status 1.651 m 79.8 kg 29.29 kg/m2 Pregnant Former Smoker Emergency Contacts Name Discharge Info Relation Home Work Mobile [place] DISCHARGE CAREGIVER [3] Mother [14] [phone] [place] DISCHARGE CAREGIVER [3] Father [15] [phone] [place]  Parent [1] [phone] Patient Belongings The following personal items are in your possession at time of discharge: 
  Dental Appliances: None  Visual Aid: Glasses      Home Medications: None   Jewelry: Ring, Earrings  Clothing: At bedside    Other Valuables: Cell Phone Please provide this summary of care documentation to your next provider. Signatures-by signing, you are acknowledging that this After Visit Summary has been reviewed with you and you have received a copy. Patient Signature:  ____________________________________________________________ Date:  ____________________________________________________________  
  
Andria Randhawa Provider Signature:  ____________________________________________________________ Date:  ____________________________________________________________

## 2018-07-13 NOTE — TELEPHONE ENCOUNTER
Pt calling in she is currently 36 wks pregnant. Yesterday she started with just mild tightening, this morning contractions started approximately 1 hour apart and have slowing decreased time between. Currently contractions are 10-12 minutes apart and becoming more intense. Pt experiencing clear discharge. Per physician, Dr. French Poole, have pt seen in ED for evaluation. Pt understood.

## 2018-07-14 NOTE — DISCHARGE SUMMARY
Antepartum Discharge Summary     Name: Zenobia Wise MRN: [id number]  SSN: [id number]    YOB: 1995  Age: 20 y.o. Sex: female      Admit Date: 7/13/2018    Discharge Date: 7/13/2018     Admitting Physician: Migue Mendoza MD     Attending Physician:  Sydnee Velarde MD     Admission Diagnoses: There are no admission diagnoses documented for this encounter. Discharge Diagnoses:   Problem List as of 7/13/2018  Date Reviewed: 11/30/2017          Codes Class Noted - Resolved    Rh negative state in antepartum period ICD-10-CM: O09.899, Z67.91  ICD-9-CM: V23.89  3/29/2018 - Present        Chlamydia infection affecting pregnancy ICD-10-CM: O98.819, A74.9  ICD-9-CM: 647.60, 079.98  12/12/2017 - Present    Overview Signed 12/12/2017  4:04 PM by Yaritza Phillips MD     In the first trimester             Bacteriuria during pregnancy ICD-10-CM: O99.89, R82.71  ICD-9-CM: 646.50  12/8/2017 - Present    Overview Signed 12/8/2017 10:35 PM by Yaritza Phillips MD     UCx with E.coli, treated with Keflex             Tachycardia ICD-10-CM: R00.0  ICD-9-CM: 785.0  11/30/2017 - Present    Overview Signed 3/29/2018  1:17 PM by Yaritza Phillips MD     The patient reports having Afib. I personally talked to Dr. Maximiliano morrell ( cardiologist) who said that the patient had tachycardia and was put on Metoprolol. He recommended to stop Metoprolol and no follow was indicated.               Polysubstance dependence ([facility] [address] ICD-10-CM: F19.20  ICD-9-CM: 304.80  9/7/2015 - Present        PTSD (post-traumatic stress disorder) ICD-10-CM: F43.10  ICD-9-CM: 309.81  9/7/2015 - Present        Alcohol intoxication ([facility] [address] ICD-10-CM: A46.313  ICD-9-CM: 305.00  9/7/2015 - Present        Borderline personality disorder ICD-10-CM: F60.3  ICD-9-CM: 301.83  9/7/2015 - Present        Antisocial personality disorder ICD-10-CM: F60.2  ICD-9-CM: 301.7  9/7/2015 - Present        Oppositional defiant disorder ICD-10-CM: F91.3  ICD-9-CM: 313.81  2015 - Present        Conduct disorder ICD-10-CM: F91.9  ICD-9-CM: 312.9  2015 - Present        Depression with suicidal ideation ICD-10-CM: F32.9, R45.851  ICD-9-CM: 420, V62.84  2015 - Present        Overdose ICD-10-CM: T50.901A  ICD-9-CM: 977.9, E980.5  2015 - Present        Migraine ICD-10-CM: R67.832  ICD-9-CM: 346.90  2015 - Present        Skin change ICD-10-CM: R23.9  ICD-9-CM: 782.9  2015 - Present        Mood disorder ([address] ICD-10-CM: F39  ICD-9-CM: 296.90  2015 - Present        RESOLVED: History of inadequate prenatal care ICD-10-CM: O09.30  ICD-9-CM: V23.7  3/13/2014 - 3/13/2014        RESOLVED: Tonsillitis, chronic ICD-10-CM: J35.01  ICD-9-CM: 474.00  2011 - 3/13/2014    Overview Signed 3/13/2014  1:54 PM by Lakshmi Martines MD     S/p tonsillectomy                  No results found for: RUBELLAEXT, GRBSEXT    Immunization(s):   Immunization History   Administered Date(s) Administered    Influenza Vaccine PF 2014    Rho(D) Immune Globulin - IM 2018    Tdap 2014, 2018        Hospital Course:    Patient is a 20yo  at 36w4d who was admitted to observation in L&D triage for contractions. Patient was found to not be in labor with category 1 FHT, no contractions on Toconometry, and a closed cervix. Patient was encouraged to PO hydrate and take tylenol PRN pain. She was discharged with labor precautions and will follow up with continuity OB provider, Dr Freddy Blackman. Condition at Discharge: stable    Patient Instructions:   Current Discharge Medication List      CONTINUE these medications which have NOT CHANGED    Details   prenatal vit-calcium-iron-fa (PRENATAL PLUS WITH CALCIUM) 27 mg iron- 1 mg tab Take 1 Tab by mouth daily. Qty: 30 Tab, Refills: 11    Associated Diagnoses: Missed menses      doxylamine succinate (UNISOM) 25 mg tablet Take 1 Tab by mouth nightly as needed for Sleep.   Qty: 30 Tab, Refills: 1 Associated Diagnoses: Morning sickness      pyridoxine, vitamin B6, (VITAMIN B-6) 50 mg tablet Take 1 Tab by mouth three (3) times daily as needed.   Qty: 90 Tab, Refills: 1    Associated Diagnoses: Morning sickness                Signed By:  Bertram Green MD    Family Medicine Resident

## 2018-07-14 NOTE — H&P
History & Physical    Name: Bozena Foster MRN: [id number]  SSN: [id number]    YOB: 1995  Age: 20 y.o. Sex: female      Subjective:     Reason for Admission:  Pregnancy and Contractions    History of Present Illness: Ms. Chris Heaton is a 20 y.o.   female with an estimated gestational age of 37w2d with Estimated Date of Delivery: 18. Patient complains of contractions that began 2 days ago and intensifed today at 11AM. Later in the afternoon she noticed they started occurring every 10-12minutes so she decided to come to the emergency room. She denies LOF, VB. Endorses normal fetal movement. Denies dysuria. Pregnancy has been complicated by chlamydia positive s/p treatment and negative BHAVIK on 3/2/18 and 7/3/18, maternal hx of substance abuse, RH negative status s/p Rhogam, significant maternal psychiatric history including PTSD, depression, borderline personality disorder. Patient denies headache , nausea and vomiting, right upper quadrant pain   and shortness of breath.     OB History    Para Term  AB Living   2 1 1   1   SAB TAB Ectopic Molar Multiple Live Births              # Outcome Date GA Lbr Ray/2nd Weight Sex Delivery Anes PTL Lv   2 Current            1 Term 14   6 lb 8 oz (2.948 kg) M VAGINAL DELI         Birth Comments: Delivered at home, no prenatal care        Past Medical History:   Diagnosis Date    Atrial fibrillation ([facility] [address] 2017    Borderline personality disorder     vs. traits thereof    Chlamydia infection affecting pregnancy 2017    Chronic tonsillitis     s/p tonsillectomy     Conduct disorder     History of inadequate prenatal care 3/13/2014    Migraine     Mood disorder ([facility] [address] 2015    Oppositional defiant disorder     Substance abuse     mj, alcohol, heroin     Past Surgical History:   Procedure Laterality Date    HX OTHER SURGICAL      HX TONSILLECTOMY       Social History     Occupational History    Not on file.     Social History Main Topics    Smoking status: Former Smoker     Packs/day: 0.25     Quit date: 2016    Smokeless tobacco: Never Used    Alcohol use Yes      Comment: socially    Drug use: No      Comment: states no longer doing marijuana.  Sexual activity: Yes     Partners: Male     Birth control/ protection: None      Family History   Problem Relation Age of Onset    Anxiety Mother     Depression Mother     Alcohol abuse Mother      still using    Anxiety Father     Lupus Maternal Grandmother     Depression Sister     Depression Sister        No Known Allergies  Prior to Admission medications    Medication Sig Start Date End Date Taking? Authorizing Provider   prenatal vit-calcium-iron-fa (PRENATAL PLUS WITH CALCIUM) 27 mg iron- 1 mg tab Take 1 Tab by mouth daily. 17  Yes Marialuisa Morillo MD   doxylamine succinate (UNISOM) 25 mg tablet Take 1 Tab by mouth nightly as needed for Sleep. 17   Marialuisa Morillo MD   pyridoxine, vitamin B6, (VITAMIN B-6) 50 mg tablet Take 1 Tab by mouth three (3) times daily as needed. 17   Marialuisa Morillo MD        Review of Systems:  A comprehensive review of systems was negative except for that written in the History of Present Illness.      Objective:     Vitals:    Vitals:    18 2003 18   BP:       Pulse:       Resp:       Temp:       SpO2: 96% 95% 96% 97%   Weight:       Height:          Temp (24hrs), Av °F (36.7 °C), Min:98 °F (36.7 °C), Max:98 °F (36.7 °C)    BP  Min: 131/89  Max: 131/89     Physical Exam:  Heart: Regular rate and rhythm, S1S2 present or without murmur or extra heart sounds  Lung: clear to auscultation throughout lung fields, no wheezes, no rales, no rhonchi and normal respiratory effort  Abdomen: soft, nontender, gravid  Lower Extremities: no lower extremity edema  Membranes:  Intact  Uterine Activity:  none  Fetal Heart Rate:  Baseline 130, moderate variability, +acels, no decels      Cervical Exam: closed/ long/ high    Lab/Data Review:  Recent Results (from the past 24 hour(s))   URINALYSIS W/ REFLEX CULTURE    Collection Time: 18  8:03 PM   Result Value Ref Range    Color YELLOW/STRAW      Appearance CLEAR CLEAR      Specific gravity <1.005 1.003 - 1.030    pH (UA) 6.5 5.0 - 8.0      Protein NEGATIVE  NEG mg/dL    Glucose NEGATIVE  NEG mg/dL    Ketone NEGATIVE  NEG mg/dL    Bilirubin NEGATIVE  NEG      Blood NEGATIVE  NEG      Urobilinogen 0.2 0.2 - 1.0 EU/dL    Nitrites NEGATIVE  NEG      Leukocyte Esterase NEGATIVE  NEG      WBC 0-4 0 - 4 /hpf    RBC 0-5 0 - 5 /hpf    Epithelial cells FEW FEW /lpf    Bacteria NEGATIVE  NEG /hpf    UA:UC IF INDICATED CULTURE NOT INDICATED BY UA RESULT CNI      Hyaline cast 0-2 0 - 5 /lpf   DRUG SCREEN, URINE    Collection Time: 18  8:03 PM   Result Value Ref Range    AMPHETAMINES NEGATIVE  NEG      BARBITURATES NEGATIVE  NEG      BENZODIAZEPINES NEGATIVE  NEG      COCAINE NEGATIVE  NEG      METHADONE NEGATIVE  NEG      OPIATES NEGATIVE  NEG      PCP(PHENCYCLIDINE) NEGATIVE  NEG      THC (TH-CANNABINOL) NEGATIVE  NEG      Drug screen comment (NOTE)        Assessment and Plan:     Ms. Tj Perez is a 20 y.o.   female with an estimated gestational age of 37w2d who is admitted to observation for contractions. IUP at 36w4d by 7wk ultrasound   1. Patient likely having ryan forbes contractions.  Patient advised to PO hydrate and take tylenol as needed for pain and follow up with PCP outpatient   2. UA negative    Hx Substance abuse:  -Patient denies substance abuse during pregnancy  -UDS negative      Patient discussed with Dr. Bhanu Merino ( On-call OB/GYN)    Chito Winters MD  Family Medicine Resident

## 2018-07-14 NOTE — H&P
History & Physical    Name: Shalonda Leal MRN: [id number]  SSN: [id number]    YOB: 1995  Age: 20 y.o. Sex: female        Subjective:     Estimated Date of Delivery: 18  OB History    Para Term  AB Living   2 1 1   1   SAB TAB Ectopic Molar Multiple Live Births              # Outcome Date GA Lbr Ray/2nd Weight Sex Delivery Anes PTL Lv   2 Current            1 Term 14   2.948 kg M VAGINAL DELI         Birth Comments: Delivered at home, no prenatal care          Ms. Chakraborty is seen with pregnancy at 36w4d for back pain . Prenatal course was complicated by pysch history. Please see prenatal records for details. Past Medical History:   Diagnosis Date    Atrial fibrillation ([facility] [address] 2017    Borderline personality disorder     vs. traits thereof    Chlamydia infection affecting pregnancy 2017    Chronic tonsillitis     s/p tonsillectomy     Conduct disorder     History of inadequate prenatal care 3/13/2014    Migraine     Mood disorder ([facility] [address] 2015    Oppositional defiant disorder     Substance abuse     mj, alcohol, heroin     Past Surgical History:   Procedure Laterality Date    HX OTHER SURGICAL      HX TONSILLECTOMY       Social History     Occupational History    Not on file. Social History Main Topics    Smoking status: Former Smoker     Packs/day: 0.25     Quit date: 2016    Smokeless tobacco: Never Used    Alcohol use Yes      Comment: socially    Drug use: No      Comment: states no longer doing marijuana.  Sexual activity: Yes     Partners: Male     Birth control/ protection: None     Family History   Problem Relation Age of Onset    Anxiety Mother     Depression Mother     Alcohol abuse Mother      still using    Anxiety Father     Lupus Maternal Grandmother     Depression Sister     Depression Sister        No Known Allergies  Prior to Admission medications    Medication Sig Start Date End Date Taking? Authorizing Provider   prenatal vit-calcium-iron-fa (PRENATAL PLUS WITH CALCIUM) 27 mg iron- 1 mg tab Take 1 Tab by mouth daily. 11/30/17  Yes Rene Reed MD   doxylamine succinate (UNISOM) 25 mg tablet Take 1 Tab by mouth nightly as needed for Sleep. 12/14/17   Rene Reed MD   pyridoxine, vitamin B6, (VITAMIN B-6) 50 mg tablet Take 1 Tab by mouth three (3) times daily as needed. 12/14/17   Rene Reed MD        Review of Systems: Constitutional: negative  Respiratory: negative  Cardiovascular: negative  Gastrointestinal: negative  Genitourinary:negative  Hematologic/lymphatic: negative  Musculoskeletal:negative  Neurological: negative  Behavioral/Psych: negative  Endocrine: negative    Objective:     Vitals:  Vitals:    07/13/18 2003 07/13/18 2008 07/13/18 2013 07/13/18 2018   BP:       Pulse:       Resp:       Temp:       SpO2: 96% 95% 96% 97%   Weight:       Height:            Physical Exam:  Patient without distress.   Heart: Regular rate and rhythm  Lung: clear to auscultation throughout lung fields and normal respiratory effort  Abdomen: soft, nontender  Perineum: blood absent, amniotic fluid absent  Cervical Exam: l/c  Membranes:  Intact  Fetal Heart Rate: fhr 130s cat I tracing without decels without ctx    Prenatal Labs:   Lab Results   Component Value Date/Time    ABO/Rh(D) B NEG 02/19/2014 04:00 PM         Assessment/Plan:      back pain with pregnancy    Plan   Precautions given tylenol prn for pain    Signed By:  Dustin Marion MD     July 13, 2018

## 2018-07-14 NOTE — PROGRESS NOTES
2200 Resident into see pt & reviews plan of care. Dr Gutierres Speaker notified of pt & plan of care. 2207 Discharge instructions given, verbally & preprinted. Pt signs & verbalizes understanding.

## 2018-07-14 NOTE — DISCHARGE INSTRUCTIONS
[employer] Activation    Thank you for requesting access to [facility]. Please follow the instructions below to securely access and download your online medical record. [facility]In[facility] allows you to send messages to your doctor, view your test results, renew your prescriptions, schedule appointments, and more. How Do I Sign Up? 1. In your internet browser, go to [email or web address]  2. Click on the First Time User? Click Here link in the Sign In box. You will be redirect to the New Member Sign Up page. 3. Enter your [facility]InTime Access Code exactly as it appears below. You will not need to use this code after youve completed the sign-up process. If you do not sign up before the expiration date, you must request a new code. [facility]In[facility] Access Code: Activation code not generated  Current [facility]In[facility] Status: Active (This is the date your [facility]ineInTime access code will )    4. Enter the last four digits of your Social Security Number (xxxx) and Date of Birth (mm/dd/yyyy) as indicated and click Submit. You will be taken to the next sign-up page. 5. Create a [facility]InTime ID. This will be your [facility]InTime login ID and cannot be changed, so think of one that is secure and easy to remember. 6. Create a [facility]InTime password. You can change your password at any time. 7. Enter your Password Reset Question and Answer. This can be used at a later time if you forget your password. 8. Enter your e-mail address. You will receive e-mail notification when new information is available in [address] 9. Click Sign Up. You can now view and download portions of your medical record. 10. Click the Download Summary menu link to download a portable copy of your medical information. Additional Information    If you have questions, please visit the Frequently Asked Questions section of the [facility]In[facility] website at [email or web address] Remember, [facility]In[facility] is NOT to be used for urgent needs. For medical emergencies, dial 911.          Pregnancy Precautions: Care Instructions  Your Care Instructions    There is no sure way to prevent labor before your due date ( labor) or to prevent most other pregnancy problems. But there are things you can do to increase your chances of a healthy pregnancy. Go to your appointments, follow your doctor's advice, and take good care of yourself. Eat well, and exercise (if your doctor agrees). And make sure to drink plenty of water. Follow-up care is a key part of your treatment and safety. Be sure to make and go to all appointments, and call your doctor if you are having problems. It's also a good idea to know your test results and keep a list of the medicines you take. How can you care for yourself at home? · Make sure you go to your prenatal appointments. At each visit, your doctor will check your blood pressure. Your doctor will also check to see if you have protein in your urine. High blood pressure and protein in urine are signs of preeclampsia. This condition can be dangerous for you and your baby. · Drink plenty of fluids, enough so that your urine is light yellow or clear like water. Dehydration can cause contractions. If you have kidney, heart, or liver disease and have to limit fluids, talk with your doctor before you increase the amount of fluids you drink. · Tell your doctor right away if you notice any symptoms of an infection, such as:  ¨ Burning when you urinate. ¨ A foul-smelling discharge from your vagina. ¨ Vaginal itching. ¨ Unexplained fever. ¨ Unusual pain or soreness in your uterus or lower belly. · Eat a balanced diet. Include plenty of foods that are high in calcium and iron. ¨ Foods high in calcium include milk, cheese, yogurt, almonds, and broccoli. ¨ Foods high in iron include red meat, shellfish, poultry, eggs, beans, raisins, whole-grain bread, and leafy green vegetables. · Do not smoke. If you need help quitting, talk to your doctor about stop-smoking programs and medicines. These can increase your chances of quitting for good. · Do not drink alcohol or use illegal drugs. · Follow your doctor's directions about activity. Your doctor will let you know how much, if any, exercise you can do. · Ask your doctor if you can have sex. If you are at risk for early labor, your doctor may ask you to not have sex. · Take care to prevent falls. During pregnancy, your joints are loose, and your balance is off. Sports such as bicycling, skiing, or in-line skating can increase your risk of falling. And don't ride horses or motorcycles, dive, water ski, scuba dive, or parachute jump while you are pregnant. · Avoid getting very hot. Do not use saunas or hot tubs. Avoid staying out in the sun in hot weather for long periods. Take acetaminophen (Tylenol) to lower a high fever. · Do not take any over-the-counter or herbal medicines or supplements without talking to your doctor or pharmacist first.  When should you call for help? Call 911 anytime you think you may need emergency care. For example, call if:    · You passed out (lost consciousness).     · You have severe vaginal bleeding.     · You have severe pain in your belly or pelvis.     · You have had fluid gushing or leaking from your vagina and you know or think the umbilical cord is bulging into your vagina. If this happens, immediately get down on your knees so your rear end (buttocks) is higher than your head. This will decrease the pressure on the cord until help arrives.   [facility] your doctor now or seek immediate medical care if:    · You have signs of preeclampsia, such as:  ¨ Sudden swelling of your face, hands, or feet. ¨ New vision problems (such as dimness or blurring). ¨ A severe headache.     · You have any vaginal bleeding.     · You have belly pain or cramping.     · You have a fever.     · You have had regular contractions (with or without pain) for an hour.  This means that you have 8 or more within 1 hour or 4 or more in 20 minutes after you change your position and drink fluids.     · You have a sudden release of fluid from your vagina.     · You have low back pain or pelvic pressure that does not go away.     · You notice that your baby has stopped moving or is moving much less than normal.    Watch closely for changes in your health, and be sure to contact your doctor if you have any problems. Where can you learn more? Go to http://luis-keron.info/. Enter [id number] in the search box to learn more about \"Pregnancy Precautions: Care Instructions. \"  Current as of: November 21, 2017  Content Version: 11.7  © 6921-2035 [employer]. Care instructions adapted under license by [employer] (which disclaims liability or warranty for this information). If you have questions about a medical condition or this instruction, always ask your healthcare professional. [address] any warranty or liability for your use of this information. Latasha Cage Contractions: Care Instructions  Your Care Instructions    Mark Simpson contractions prepare your uterus for labor. Think of them as a \"warm-up\" exercise that your body does. You may begin to feel them between the 28th and 30th weeks of your pregnancy. But they start as early as the 20th week. Gem Simpson contractions usually occur more often during the ninth month. They may go away when you are active and return when you rest. These contractions are like mild contractions of true labor, but they occur less often. (You feel fewer than 8 in an hour.) They don't cause your cervix to open. It may be hard for you to tell the difference between Latasha Cage contractions and true labor, especially in your first pregnancy. Follow-up care is a key part of your treatment and safety. Be sure to make and go to all appointments, and call your doctor if you are having problems.  It's also a good idea to know your test results and keep a list of the medicines you take. How can you care for yourself at home? · Try a warm bath to help relieve muscle tension and reduce pain. · Change positions every 30 minutes. Take breaks if you must sit for a long time. Get up and walk around. · Drink plenty of water, enough so that your urine is light yellow or clear like water. · Taking short walks may help you feel better. Your doctor needs to check any contractions that are getting stronger or closer together. Where can you learn more? Go to http://luis-keron.info/. Enter [id number] in the search box to learn more about \"Mark Simpson Contractions: Care Instructions. \"  Current as of: November 21, 2017  Content Version: 11.7  © 7065-1890 "[employer]. Care instructions adapted under license by [employer] (which disclaims liability or warranty for this information). If you have questions about a medical condition or this instruction, always ask your healthcare professional. [address] any warranty or liability for your use of this information.

## 2018-07-16 ENCOUNTER — ROUTINE PRENATAL (OUTPATIENT)
Dept: FAMILY MEDICINE CLINIC | Age: 23
End: 2018-07-16

## 2018-07-16 VITALS
RESPIRATION RATE: 18 BRPM | HEIGHT: 65 IN | SYSTOLIC BLOOD PRESSURE: 123 MMHG | WEIGHT: 177.4 LBS | OXYGEN SATURATION: 97 % | BODY MASS INDEX: 29.56 KG/M2 | HEART RATE: 96 BPM | TEMPERATURE: 98.4 F | DIASTOLIC BLOOD PRESSURE: 87 MMHG

## 2018-07-16 DIAGNOSIS — Z34.03 ENCOUNTER FOR SUPERVISION OF NORMAL FIRST PREGNANCY IN THIRD TRIMESTER: Primary | ICD-10-CM

## 2018-07-16 LAB
BILIRUB UR QL STRIP: NORMAL
GLUCOSE UR-MCNC: NEGATIVE MG/DL
KETONES P FAST UR STRIP-MCNC: NORMAL MG/DL
PH UR STRIP: 6.5 [PH] (ref 4.6–8)
PROT UR QL STRIP: NORMAL
SP GR UR STRIP: 1.02 (ref 1–1.03)
UA UROBILINOGEN AMB POC: NORMAL (ref 0.2–1)
URINALYSIS CLARITY POC: NORMAL
URINALYSIS COLOR POC: NORMAL
URINE BLOOD POC: NEGATIVE
URINE LEUKOCYTES POC: NORMAL
URINE NITRITES POC: NEGATIVE

## 2018-07-16 NOTE — PROGRESS NOTES
I reviewed with the resident the medical history and the resident's findings on the physical examination. Marguerite Painting discussed with the resident the patient's diagnosis and concur with the plan.      22yo  @ 35.1 by LMP=first tri sono  1.  IUP: GTT WNL, anatomy normal, s/p tdap, GBS this visit   2.  Rh neg: s/p rhogam on 18  3.  Hx chlamydia: treated with neg BHAVIK, repeat this visit   4.  Hx UTI: treated and neg BHAVIK  5.  Hx substance abuse: UDS's negative in pregnancy, checking Q trimester and at time of labor   6.  Psych disorder: will need close f/u for PP depression screening   7.  Hx tachycardia: per pt's cardiologist this is due to her hx of alcohol abuse (Pt reports a hx of Afib which cardiology denies)

## 2018-07-16 NOTE — PROGRESS NOTES
I reviewed with the resident the medical history and the resident's findings on the physical examination. I discussed with the resident the patient's diagnosis and concur with the plan. Kalina Deleon is a 20 y.o. female  at 37w0d. presents for routine PNC. 2018, 7w4d ultrasound (Date entered prior to episode creation)  Concerns addressed:  Routine PNC  Pregnancy complicated by: Rh-, Chlamydia with neg BHAVIK. GBS neg  Denies VB, LOF, Contractions. + Fetal movement. Weight gain reviewed.   RTC in 1 week    Visit Vitals    /87    Pulse 96    Temp 98.4 °F (36.9 °C) (Oral)    Resp 18    Ht 5' 5\" (1.651 m)    Wt 177 lb 6.4 oz (80.5 kg)    LMP 10/21/2017 (Exact Date)    SpO2 97%    BMI 29.52 kg/m2     FHT: 150s  FH: 37cm  Bedside U/S confirms cephalic presentation    Recent Results (from the past 24 hour(s))   AMB POC URINALYSIS DIP STICK AUTO W/O MICRO    Collection Time: 18  1:30 PM   Result Value Ref Range    Color (UA POC) Stacia     Clarity (UA POC) Cloudy     Glucose (UA POC) Negative Negative    Bilirubin (UA POC) 1+ Negative    Ketones (UA POC) 3+ Negative    Specific gravity (UA POC) 1.025 1.001 - 1.035    Blood (UA POC) Negative Negative    pH (UA POC) 6.5 4.6 - 8.0    Protein (UA POC) Trace Negative    Urobilinogen (UA POC) 0.2 mg/dL 0.2 - 1    Nitrites (UA POC) Negative Negative    Leukocyte esterase (UA POC) 1+ Negative

## 2018-07-16 NOTE — MR AVS SNAPSHOT
[address] 
[phone] Patient: Sorin Early MRN: [id number] :[id number] Visit Information Date & Time Provider Department Dept. Phone Encounter #  
 2018  1:45 PM Rene Reed MD [facility] [phone] [id number] Follow-up Instructions Return in about 1 week (around 2018). Upcoming Health Maintenance Date Due  
 HPV Age 9Y-34Y (1 of 1 - Female 3 Dose Series) 2005 Influenza Age 5 to Adult 2018 PAP AKA CERVICAL CYTOLOGY 2020 DTaP/Tdap/Td series (3 - Td) 2028 Allergies as of 2018  Review Complete On: 2018 By: Polo Torres LPN No Known Allergies Current Immunizations  Reviewed on 2018 Name Date Influenza Vaccine PF 2014  2:32 PM  
 Rho(D) Immune Globulin - IM 2018 Tdap 2018, 2014  2:30 PM  
  
 Not reviewed this visit You Were Diagnosed With   
  
 Codes Comments Encounter for supervision of normal first pregnancy in third trimester    -  Primary ICD-10-CM: Z34.03 
ICD-9-CM: V22.0 Vitals BP Pulse Temp Resp Height(growth percentile) Weight(growth percentile) 123/87 96 98.4 °F (36.9 °C) (Oral) 18 5' 5\" (1.651 m) 177 lb 6.4 oz (80.5 kg) LMP SpO2 BMI OB Status Smoking Status 10/21/2017 (Exact Date) 97% 29.52 kg/m2 Pregnant Former Smoker Vitals History BMI and BSA Data Body Mass Index Body Surface Area  
 29.52 kg/m 2 1.92 m 2 Preferred Pharmacy Pharmacy Name Phone [id number] [place], [address] AT [facility] [phone] Your Updated Medication List  
  
   
This list is accurate as of 18  2:13 PM.  Always use your most recent med list.  
  
  
  
  
 doxylamine succinate 25 mg tablet Commonly known as:  Renan Rocha Take 1 Tab by mouth nightly as needed for Sleep. prenatal vit-calcium-iron-fa 27 mg iron- 1 mg Tab Commonly known as:  PRENATAL PLUS with CALCIUM Take 1 Tab by mouth daily. pyridoxine (vitamin B6) 50 mg tablet Commonly known as:  VITAMIN B-6 Take 1 Tab by mouth three (3) times daily as needed. We Performed the Following AMB POC URINALYSIS DIP STICK AUTO W/O MICRO [81692 CPT(R)] Follow-up Instructions Return in about 1 week (around 7/23/2018). Patient Instructions Week 37 of Your Pregnancy: Care Instructions Your Care Instructions You are near the end of your pregnancy-and you're probably pretty uncomfortable. It may be harder to walk around. Lying down probably isn't comfortable either. You may have trouble getting to sleep or staying asleep. Most women deliver their babies between 40 and 41 weeks. This is a good time to think about packing a bag for the hospital with items you'll need. Then you'll be ready when labor starts. Follow-up care is a key part of your treatment and safety. Be sure to make and go to all appointments, and call your doctor if you are having problems. It's also a good idea to know your test results and keep a list of the medicines you take. How can you care for yourself at home? Learn about breastfeeding · Breastfeeding is best for your baby and good for you. · Breast milk has antibodies to help your baby fight infections. · Mothers who breastfeed often lose weight faster, because making milk burns calories. · Learning the best ways to hold your baby will make breastfeeding easier. · Let your partner bathe and diaper the baby to keep your partner from feeling left out. Snuggle together when you breastfeed. · You may want to learn how to use a breast pump and store your milk. · If you choose to bottle feed, make the feeding feel like breastfeeding so you can bond with your baby. Always hold your baby and the bottle.  Do not prop bottles or let your baby fall asleep with a bottle. Learn about crying · It is common for babies to cry for 1 to 3 hours a day. Some cry more, some cry less. · Babies don't cry to make you upset or because you are a bad parent. · Crying is how your baby communicates. Your baby may be hungry; have gas; need a diaper change; or feel cold, warm, tired, lonely, or tense. Sometimes babies cry for unknown reasons. · If you respond to your baby's needs, he or she will learn to trust you. · Try to stay calm when your baby cries. Your baby may get more upset if he or she senses that you are upset. Know how to care for your  · Your baby's umbilical cord stump will drop off on its own, usually between 1 and 2 weeks. To care for your baby's umbilical cord area: ¨ Clean the area at the bottom of the cord 2 or 3 times a day. ¨ Pay special attention to the area where the cord attaches to the skin. ¨ Keep the diaper folded below the cord. ¨ Use a damp washcloth or cotton ball to sponge bathe your baby until the stump has come off. · Your baby's first dark stool is called meconium. After the meconium is passed, your baby will develop his or her own bowel pattern. ¨ Some babies, especially  babies, have several bowel movements a day. Others have one or two a day, or one every 2 to 3 days. ¨  babies often have loose, yellow stools. Formula-fed babies have more formed stools. ¨ If your baby's stools look like little pellets, he or she is constipated. After 2 days of constipation, call your baby's doctor. · If your baby will be circumcised, you can care for him at home. ¨ Gently rinse his penis with warm water after every diaper change. Do not try to remove the film that forms on the penis. This film will go away on its own. Pat dry. ¨ Put petroleum ointment, such as Vaseline, on the area of the diaper that will touch your baby's penis.  This will keep the diaper from sticking to your baby. ¨ Ask the doctor about giving your baby acetaminophen (Tylenol) for pain. Where can you learn more? Go to http://luis-keron.info/. Enter 68 21 97 in the search box to learn more about \"Week 37 of Your Pregnancy: Care Instructions. \" Current as of: November 21, 2017 Content Version: 11.7 © 0499-1058 [employer]. Care instructions adapted under license by [employer] (which disclaims liability or warranty for this information). If you have questions about a medical condition or this instruction, always ask your healthcare professional. [address] any warranty or liability for your use of this information. Introducing [facility] & HEALTH SERVICES! Dear Kathy Ibarra: 
Thank you for requesting a [employer] account. Our records indicate that you already have an active [employer] account. You can access your account anytime at [email or web address] Did you know that you can access your hospital and ER discharge instructions at any time in [employer]? You can also review all of your test results from your hospital stay or ER visit. Additional Information If you have questions, please visit the Frequently Asked Questions section of the [employer] website at [email or web address] Remember, [employer] is NOT to be used for urgent needs. For medical emergencies, dial 911. Now available from your iPhone and Android! Please provide this summary of care documentation to your next provider. Your primary care clinician is listed as Michelle Talbert. If you have any questions after today's visit, please call [phone].

## 2018-07-16 NOTE — PROGRESS NOTES
Identified Patient with two Patient identifiers (Name and ). Two Patient Identifiers confirmed. Reviewed record in preparation for visit and have obtained necessary documentation. Chief Complaint   Patient presents with    Routine Prenatal Visit     37 Weeks 0 Days -     Contractions     Patient in the office to for routine prenatal appointment today. Patient denies vaginal discharge, abnormal vaginal spotting/bleeding or fluid leakage. Patient c/o contraction which has decrease in severity since evaluated at Labor & Delivery  Friday . States she is compliant with taking prenatal vitamins as prescribed. No further concerns voiced at this time     Visit Vitals    /87    Pulse (!) 121    Temp 98.4 °F (36.9 °C) (Oral)    Resp 18    Ht 5' 5\" (1.651 m)    Wt 177 lb 6.4 oz (80.5 kg)    SpO2 97%    BMI 29.52 kg/m2       1. Have you been to the ER, urgent care clinic since your last visit? Hospitalized since your last visit? Yes When: 2018 [facility] Labor& Delivery due to contractions    2. Have you seen or consulted any other health care providers outside of the [address] [place] [address] since your last visit? Include any pap smears or colon screening.  No

## 2018-07-16 NOTE — PATIENT INSTRUCTIONS
Week 37 of Your Pregnancy: Care Instructions  Your Care Instructions    You are near the end of your pregnancy-and you're probably pretty uncomfortable. It may be harder to walk around. Lying down probably isn't comfortable either. You may have trouble getting to sleep or staying asleep. Most women deliver their babies between 40 and 41 weeks. This is a good time to think about packing a bag for the hospital with items you'll need. Then you'll be ready when labor starts. Follow-up care is a key part of your treatment and safety. Be sure to make and go to all appointments, and call your doctor if you are having problems. It's also a good idea to know your test results and keep a list of the medicines you take. How can you care for yourself at home? Learn about breastfeeding  · Breastfeeding is best for your baby and good for you. · Breast milk has antibodies to help your baby fight infections. · Mothers who breastfeed often lose weight faster, because making milk burns calories. · Learning the best ways to hold your baby will make breastfeeding easier. · Let your partner bathe and diaper the baby to keep your partner from feeling left out. Snuggle together when you breastfeed. · You may want to learn how to use a breast pump and store your milk. · If you choose to bottle feed, make the feeding feel like breastfeeding so you can bond with your baby. Always hold your baby and the bottle. Do not prop bottles or let your baby fall asleep with a bottle. Learn about crying  · It is common for babies to cry for 1 to 3 hours a day. Some cry more, some cry less. · Babies don't cry to make you upset or because you are a bad parent. · Crying is how your baby communicates. Your baby may be hungry; have gas; need a diaper change; or feel cold, warm, tired, lonely, or tense. Sometimes babies cry for unknown reasons. · If you respond to your baby's needs, he or she will learn to trust you.   · Try to stay calm when your baby cries. Your baby may get more upset if he or she senses that you are upset. Know how to care for your   · Your baby's umbilical cord stump will drop off on its own, usually between 1 and 2 weeks. To care for your baby's umbilical cord area:  ¨ Clean the area at the bottom of the cord 2 or 3 times a day. ¨ Pay special attention to the area where the cord attaches to the skin. ¨ Keep the diaper folded below the cord. ¨ Use a damp washcloth or cotton ball to sponge bathe your baby until the stump has come off. · Your baby's first dark stool is called meconium. After the meconium is passed, your baby will develop his or her own bowel pattern. ¨ Some babies, especially  babies, have several bowel movements a day. Others have one or two a day, or one every 2 to 3 days. ¨  babies often have loose, yellow stools. Formula-fed babies have more formed stools. ¨ If your baby's stools look like little pellets, he or she is constipated. After 2 days of constipation, call your baby's doctor. · If your baby will be circumcised, you can care for him at home. ¨ Gently rinse his penis with warm water after every diaper change. Do not try to remove the film that forms on the penis. This film will go away on its own. Pat dry. ¨ Put petroleum ointment, such as Vaseline, on the area of the diaper that will touch your baby's penis. This will keep the diaper from sticking to your baby. ¨ Ask the doctor about giving your baby acetaminophen (Tylenol) for pain. Where can you learn more? Go to http://luis-keron.info/. Enter 68  97 in the search box to learn more about \"Week 37 of Your Pregnancy: Care Instructions. \"  Current as of: 2017  Content Version: 11.7  © 9890-6162 [employer]. Care instructions adapted under license by [employer] (which disclaims liability or warranty for this information).  If you have questions about a medical condition or this instruction, always ask your healthcare professional. [place] [address] any warranty or liability for your use of this information.
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
## 2018-07-19 ENCOUNTER — TELEPHONE (OUTPATIENT)
Dept: FAMILY MEDICINE CLINIC | Age: 23
End: 2018-07-19

## 2018-07-19 LAB — BACTERIA UR CULT: ABNORMAL

## 2018-07-19 RX ORDER — AMOXICILLIN AND CLAVULANATE POTASSIUM 875; 125 MG/1; MG/1
1 TABLET, FILM COATED ORAL EVERY 12 HOURS
Qty: 10 TAB | Refills: 0 | Status: SHIPPED | OUTPATIENT
Start: 2018-07-19 | End: 2018-07-24

## 2018-07-19 NOTE — PROGRESS NOTES
Asymptomatic bacteruria in pregnant patient with Coagulase negative Staph species. Will treat with Augmentin.

## 2018-07-19 NOTE — TELEPHONE ENCOUNTER
I called the patient at [phone] x [phone] to discuss the test results. Unable to leave a message. Called at [phone] and left a message. The patient is pregnant, she was found to have asymptomatic bacteruria which needs a treatment with Augmentin. Will send the script to the pharmacy for Augmentin x 5 days. If the patient calls back, please notify.      6:20 PM  7/19/2018  Johanne Mcconnell MD

## 2018-07-24 ENCOUNTER — ROUTINE PRENATAL (OUTPATIENT)
Dept: FAMILY MEDICINE CLINIC | Age: 23
End: 2018-07-24

## 2018-07-24 VITALS
RESPIRATION RATE: 17 BRPM | HEIGHT: 65 IN | OXYGEN SATURATION: 99 % | WEIGHT: 178 LBS | BODY MASS INDEX: 29.66 KG/M2 | DIASTOLIC BLOOD PRESSURE: 79 MMHG | TEMPERATURE: 98.7 F | SYSTOLIC BLOOD PRESSURE: 130 MMHG | HEART RATE: 96 BPM

## 2018-07-24 DIAGNOSIS — Z34.93 PRENATAL CARE IN THIRD TRIMESTER: Primary | ICD-10-CM

## 2018-07-24 LAB
BILIRUB UR QL STRIP: NEGATIVE
GLUCOSE UR-MCNC: NEGATIVE MG/DL
KETONES P FAST UR STRIP-MCNC: NEGATIVE MG/DL
PH UR STRIP: 7 [PH] (ref 4.6–8)
PROT UR QL STRIP: NEGATIVE
SP GR UR STRIP: 1.01 (ref 1–1.03)
UA UROBILINOGEN AMB POC: NORMAL (ref 0.2–1)
URINALYSIS CLARITY POC: CLEAR
URINALYSIS COLOR POC: YELLOW
URINE BLOOD POC: NEGATIVE
URINE LEUKOCYTES POC: NEGATIVE
URINE NITRITES POC: NEGATIVE
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
## 2018-07-24 NOTE — PROGRESS NOTES
I reviewed with the resident the medical history and the resident's findings on the physical examination. Floresita Weinberg discussed with the resident the patient's diagnosis and concur with the plan.      22yo  @ 37.0 by LMP=first tri sono  1.  IUP: GTT WNL, anatomy normal, s/p tdap, GBS neg  2.  Rh neg: s/p rhogam on 18  3.  Hx chlamydia: treated with neg BHAVIK, repeat neg  4.  Hx UTI: treated and neg BHAVIK  5.  Hx substance abuse: UDS's negative in pregnancy, checking Q trimester and at time of labor   6.  Psych disorder: will need close f/u for PP depression screening   7.  Hx tachycardia: per pt's cardiologist this is due to her hx of alcohol abuse (Pt reports a hx of Afib which cardiology denies)

## 2018-07-24 NOTE — MR AVS SNAPSHOT
[address] 
[phone] Patient: Rajni Holder MRN: [id number] :1995 Visit Information Date & Time Provider Department Dept. Phone Encounter #  
 2018  1:30 PM Ivory Araya MD [address] [facility] [phone] [id number]  
  
 2018 10:35 AM  
OB VISIT with Carol Arrieta MD  
[address]) Appt Note: routine prenatal  
 [address] [facility]  
[phone]  
  
   
 [address] [facility] [phone] Upcoming Health Maintenance Date Due  
 HPV Age 9Y-34Y (1 of 1 - Female 3 Dose Series) 2005 Influenza Age 5 to Adult 2018 PAP AKA CERVICAL CYTOLOGY 2020 DTaP/Tdap/Td series (3 - Td) 2028 Allergies as of 2018  Review Complete On: 2018 By: Flavia Layton LPN No Known Allergies Current Immunizations  Reviewed on 2018 Name Date Influenza Vaccine PF 2014  2:32 PM  
 Rho(D) Immune Globulin - IM 2018 Tdap 2018, 2014  2:30 PM  
  
 Not reviewed this visit You Were Diagnosed With   
  
 Codes Comments Prenatal care in third trimester    -  Primary ICD-10-CM: Z34.93 
ICD-9-CM: V22.1 Vitals BP Pulse Temp Resp Height(growth percentile) Weight(growth percentile) 130/79 96 98.7 °F (37.1 °C) (Oral) 17 5' 5\" (1.651 m) 178 lb (80.7 kg) LMP SpO2 BMI OB Status Smoking Status 10/21/2017 (Exact Date) 99% 29.62 kg/m2 Pregnant Former Smoker Vitals History BMI and BSA Data Body Mass Index Body Surface Area  
 29.62 kg/m 2 1.92 m 2 Preferred Pharmacy Pharmacy Name Phone [id number] [address] [phone] Your Updated Medication List  
  
   
This list is accurate as of 18  1:50 PM.  Always use your most recent med list.  
  
  
  
  
 amoxicillin-clavulanate 875-125 mg per tablet Commonly known as:  AUGMENTIN Take 1 Tab by mouth every twelve (12) hours for 5 days. doxylamine succinate 25 mg tablet Commonly known as:  Kvng Feeling Take 1 Tab by mouth nightly as needed for Sleep.  
  
 prenatal vit-calcium-iron-fa 27 mg iron- 1 mg Tab Commonly known as:  PRENATAL PLUS with CALCIUM Take 1 Tab by mouth daily. pyridoxine (vitamin B6) 50 mg tablet Commonly known as:  VITAMIN B-6 Take 1 Tab by mouth three (3) times daily as needed. We Performed the Following AMB POC URINALYSIS DIP STICK AUTO W/O MICRO [17761 CPT(R)] Patient Instructions Week 38 of Your Pregnancy: Care Instructions Your Care Instructions Believe it or not, your baby is almost here. You may have ideas about your baby's personality because of how much he or she moves. Or you may have noticed how he or she responds to sounds, warmth, cold, and light. You may even know what kind of music your baby likes. By now, you have a better idea of what to expect during delivery. You may have talked about your birth preferences with your doctor. But even if you want a vaginal birth, it is a good idea to learn about  births.  birth means that your baby is born through a cut (incision) in your lower belly. It is sometimes the best choice for the health of the baby and the mother. This care sheet can help you understand  births. It also gives you information about what to expect after your baby is born. And it helps you understand more about postpartum depression. Follow-up care is a key part of your treatment and safety. Be sure to make and go to all appointments, and call your doctor if you are having problems. It's also a good idea to know your test results and keep a list of the medicines you take. How can you care for yourself at home? Learn about  birth · Most C-sections are unplanned. They are done because of problems that occur during labor. These problems might include: 
¨ Labor that slows or stops. ¨ High blood pressure or other problems for the mother. ¨ Signs of distress in the baby. These signs may include a very fast or slow heart rate. · Although most mothers and babies do well after , it is major surgery. It has more risks than a vaginal delivery. · In some cases, a planned  may be safer than a vaginal delivery. This may be the case if: ¨ The mother has a health problem, such as a heart condition. ¨ The baby isn't in a head-down position for delivery. This is called a breech position. ¨ The uterus has scars from past surgeries. This could increase the chance of a tear in the uterus. ¨ There is a problem with the placenta. ¨ The mother has an infection, such as genital herpes, that could be spread to the baby. ¨ The mother is having twins or more. ¨ The baby weighs 9 to 10 pounds or more. · Because of the risks of , planned C-sections generally should be done only for medical reasons. And a planned  should be done at 39 weeks or later unless there is a medical reason to do it sooner. Know what to expect after delivery, and plan for the first few weeks at home · You, your baby, and your partner or  will get identification bands. Only people with matching bands can  the baby from the nursery. · You will learn how to feed, diaper, and bathe your baby. And you will learn how to care for the umbilical cord stump. If your baby will be circumcised, you will also learn how to care for that. · Ask people to wait to visit you until you are at home. And ask them to wash their hands before they touch your baby. · Make sure you have another adult in your home for at least 2 or 3 days after the birth. · During the first 2 weeks, limit when friends and family can visit. · Do not allow visitors who have colds or infections. Make sure all visitors are up to date with their vaccinations. Never let anyone smoke around your baby. · Try to nap when the baby naps. Be aware of postpartum depression · \"Baby blues\" are common for the first 1 to 2 weeks after birth. You may cry or feel sad or irritable for no reason. · For some women, these feelings last longer and are more intense. This is called postpartum depression. · If your symptoms last for more than a few weeks or you feel very depressed, ask your doctor for help. · Postpartum depression can be treated. Support groups and counseling can help. Sometimes medicine can also help. Where can you learn more? Go to http://luis-keron.info/. Enter B044 in the search box to learn more about \"Week 38 of Your Pregnancy: Care Instructions. \" Current as of: November 21, 2017 Content Version: 11.7 © 8640-7431 [employer]. Care instructions adapted under license by [employer] (which disclaims liability or warranty for this information). If you have questions about a medical condition or this instruction, always ask your healthcare professional. [place] [address] any warranty or liability for your use of this information. Introducing [facility]! Dear Johnathan Coe: 
Thank you for requesting a [employer] account. Our records indicate that you already have an active [employer] account. You can access your account anytime at [email or web address] Did you know that you can access your hospital and ER discharge instructions at any time in [employer]? You can also review all of your test results from your hospital stay or ER visit. Additional Information If you have questions, please visit the Frequently Asked Questions section of the [employer] website at [email or web address] Remember, [employer] is NOT to be used for urgent needs. For medical emergencies, dial 911. Now available from your iPhone and Android! Please provide this summary of care documentation to your next provider. Your primary care clinician is listed as Sajan Nevarez. If you have any questions after today's visit, please call [phone].

## 2018-07-24 NOTE — PROGRESS NOTES
Chief Complaint   Patient presents with    Routine Prenatal Visit     1. Have you been to the ER, urgent care clinic since your last visit? Hospitalized since your last visit? No    2. Have you seen or consulted any other health care providers outside of the [facility] since your last visit? Include any pap smears or colon screening.  No      No abnormal bleeding or discharge    Can feel baby move

## 2018-07-24 NOTE — PATIENT INSTRUCTIONS
Week 38 of Your Pregnancy: Care Instructions  Your Care Instructions    Believe it or not, your baby is almost here. You may have ideas about your baby's personality because of how much he or she moves. Or you may have noticed how he or she responds to sounds, warmth, cold, and light. You may even know what kind of music your baby likes. By now, you have a better idea of what to expect during delivery. You may have talked about your birth preferences with your doctor. But even if you want a vaginal birth, it is a good idea to learn about  births.  birth means that your baby is born through a cut (incision) in your lower belly. It is sometimes the best choice for the health of the baby and the mother. This care sheet can help you understand  births. It also gives you information about what to expect after your baby is born. And it helps you understand more about postpartum depression. Follow-up care is a key part of your treatment and safety. Be sure to make and go to all appointments, and call your doctor if you are having problems. It's also a good idea to know your test results and keep a list of the medicines you take. How can you care for yourself at home? Learn about  birth  · Most C-sections are unplanned. They are done because of problems that occur during labor. These problems might include:  ¨ Labor that slows or stops. ¨ High blood pressure or other problems for the mother. ¨ Signs of distress in the baby. These signs may include a very fast or slow heart rate. · Although most mothers and babies do well after , it is major surgery. It has more risks than a vaginal delivery. · In some cases, a planned  may be safer than a vaginal delivery. This may be the case if:  ¨ The mother has a health problem, such as a heart condition. ¨ The baby isn't in a head-down position for delivery. This is called a breech position.   ¨ The uterus has scars from past surgeries. This could increase the chance of a tear in the uterus. ¨ There is a problem with the placenta. ¨ The mother has an infection, such as genital herpes, that could be spread to the baby. ¨ The mother is having twins or more. ¨ The baby weighs 9 to 10 pounds or more. · Because of the risks of , planned C-sections generally should be done only for medical reasons. And a planned  should be done at 39 weeks or later unless there is a medical reason to do it sooner. Know what to expect after delivery, and plan for the first few weeks at home  · You, your baby, and your partner or  will get identification bands. Only people with matching bands can  the baby from the nursery. · You will learn how to feed, diaper, and bathe your baby. And you will learn how to care for the umbilical cord stump. If your baby will be circumcised, you will also learn how to care for that. · Ask people to wait to visit you until you are at home. And ask them to wash their hands before they touch your baby. · Make sure you have another adult in your home for at least 2 or 3 days after the birth. · During the first 2 weeks, limit when friends and family can visit. · Do not allow visitors who have colds or infections. Make sure all visitors are up to date with their vaccinations. Never let anyone smoke around your baby. · Try to nap when the baby naps. Be aware of postpartum depression  · \"Baby blues\" are common for the first 1 to 2 weeks after birth. You may cry or feel sad or irritable for no reason. · For some women, these feelings last longer and are more intense. This is called postpartum depression. · If your symptoms last for more than a few weeks or you feel very depressed, ask your doctor for help. · Postpartum depression can be treated. Support groups and counseling can help. Sometimes medicine can also help. Where can you learn more?   Go to http://luis-keron.info/. Enter B044 in the search box to learn more about \"Week 38 of Your Pregnancy: Care Instructions. \"  Current as of: November 21, 2017  Content Version: 11.7  © 5349-6565 [employer]. Care instructions adapted under license by [employer] (which disclaims liability or warranty for this information). If you have questions about a medical condition or this instruction, always ask your healthcare professional. [address] any warranty or liability for your use of this information.

## 2018-08-02 ENCOUNTER — ROUTINE PRENATAL (OUTPATIENT)
Dept: FAMILY MEDICINE CLINIC | Age: 23
End: 2018-08-02

## 2018-08-02 ENCOUNTER — HOSPITAL ENCOUNTER (EMERGENCY)
Age: 23
Discharge: HOME | End: 2018-08-03
Attending: OBSTETRICS & GYNECOLOGY | Admitting: OBSTETRICS & GYNECOLOGY
Payer: COMMERCIAL

## 2018-08-02 VITALS
HEIGHT: 65 IN | OXYGEN SATURATION: 99 % | SYSTOLIC BLOOD PRESSURE: 118 MMHG | RESPIRATION RATE: 16 BRPM | WEIGHT: 177 LBS | HEART RATE: 104 BPM | DIASTOLIC BLOOD PRESSURE: 75 MMHG | TEMPERATURE: 98.3 F | BODY MASS INDEX: 29.49 KG/M2

## 2018-08-02 DIAGNOSIS — Z3A.39 39 WEEKS GESTATION OF PREGNANCY: Primary | ICD-10-CM

## 2018-08-02 LAB
ALBUMIN SERPL-MCNC: 2.9 G/DL (ref 3.5–5)
ALBUMIN/GLOB SERPL: 0.9 {RATIO} (ref 1.1–2.2)
ALP SERPL-CCNC: 121 U/L (ref 45–117)
ALT SERPL-CCNC: 13 U/L (ref 12–78)
AMPHET UR QL SCN: NEGATIVE
ANION GAP SERPL CALC-SCNC: 9 MMOL/L (ref 5–15)
AST SERPL-CCNC: 11 U/L (ref 15–37)
BARBITURATES UR QL SCN: NEGATIVE
BENZODIAZ UR QL: NEGATIVE
BILIRUB SERPL-MCNC: 0.3 MG/DL (ref 0.2–1)
BILIRUB UR QL STRIP: NORMAL
BUN SERPL-MCNC: 3 MG/DL (ref 6–20)
BUN/CREAT SERPL: 4 (ref 12–20)
CALCIUM SERPL-MCNC: 8.6 MG/DL (ref 8.5–10.1)
CANNABINOIDS UR QL SCN: NEGATIVE
CHLORIDE SERPL-SCNC: 104 MMOL/L (ref 97–108)
CO2 SERPL-SCNC: 23 MMOL/L (ref 21–32)
COCAINE UR QL SCN: NEGATIVE
CREAT SERPL-MCNC: 0.72 MG/DL (ref 0.55–1.02)
CREAT UR-MCNC: <13 MG/DL
DRUG SCRN COMMENT,DRGCM: NORMAL
ERYTHROCYTE [DISTWIDTH] IN BLOOD BY AUTOMATED COUNT: 13.5 % (ref 11.5–14.5)
GLOBULIN SER CALC-MCNC: 3.3 G/DL (ref 2–4)
GLUCOSE SERPL-MCNC: 107 MG/DL (ref 65–100)
GLUCOSE UR-MCNC: NEGATIVE MG/DL
HCT VFR BLD AUTO: 36.6 % (ref 35–47)
HGB BLD-MCNC: 11.8 G/DL (ref 11.5–16)
KETONES P FAST UR STRIP-MCNC: NEGATIVE MG/DL
LDH SERPL L TO P-CCNC: 135 U/L (ref 81–246)
MCH RBC QN AUTO: 28 PG (ref 26–34)
MCHC RBC AUTO-ENTMCNC: 32.2 G/DL (ref 30–36.5)
MCV RBC AUTO: 86.7 FL (ref 80–99)
METHADONE UR QL: NEGATIVE
NRBC # BLD: 0 K/UL (ref 0–0.01)
NRBC BLD-RTO: 0 PER 100 WBC
OPIATES UR QL: NEGATIVE
PCP UR QL: NEGATIVE
PH UR STRIP: 6.5 [PH] (ref 4.6–8)
PLATELET # BLD AUTO: 252 K/UL (ref 150–400)
PMV BLD AUTO: 11.3 FL (ref 8.9–12.9)
POTASSIUM SERPL-SCNC: 3.6 MMOL/L (ref 3.5–5.1)
PROT SERPL-MCNC: 6.2 G/DL (ref 6.4–8.2)
PROT UR QL STRIP: NORMAL
PROT UR-MCNC: <6 MG/DL (ref 0–11.9)
PROT/CREAT UR-RTO: NORMAL
RBC # BLD AUTO: 4.22 M/UL (ref 3.8–5.2)
SODIUM SERPL-SCNC: 136 MMOL/L (ref 136–145)
SP GR UR STRIP: 1.02 (ref 1–1.03)
UA UROBILINOGEN AMB POC: NORMAL (ref 0.2–1)
URATE SERPL-MCNC: 4 MG/DL (ref 2.6–6)
URINALYSIS CLARITY POC: CLEAR
URINALYSIS COLOR POC: YELLOW
URINE BLOOD POC: NORMAL
URINE LEUKOCYTES POC: NORMAL
URINE NITRITES POC: NEGATIVE
WBC # BLD AUTO: 20 K/UL (ref 3.6–11)

## 2018-08-02 PROCEDURE — 85027 COMPLETE CBC AUTOMATED: CPT | Performed by: STUDENT IN AN ORGANIZED HEALTH CARE EDUCATION/TRAINING PROGRAM

## 2018-08-02 PROCEDURE — 36415 COLL VENOUS BLD VENIPUNCTURE: CPT | Performed by: STUDENT IN AN ORGANIZED HEALTH CARE EDUCATION/TRAINING PROGRAM

## 2018-08-02 PROCEDURE — 80307 DRUG TEST PRSMV CHEM ANLYZR: CPT | Performed by: STUDENT IN AN ORGANIZED HEALTH CARE EDUCATION/TRAINING PROGRAM

## 2018-08-02 PROCEDURE — 82570 ASSAY OF URINE CREATININE: CPT | Performed by: STUDENT IN AN ORGANIZED HEALTH CARE EDUCATION/TRAINING PROGRAM

## 2018-08-02 PROCEDURE — 83615 LACTATE (LD) (LDH) ENZYME: CPT | Performed by: STUDENT IN AN ORGANIZED HEALTH CARE EDUCATION/TRAINING PROGRAM

## 2018-08-02 PROCEDURE — 80053 COMPREHEN METABOLIC PANEL: CPT | Performed by: STUDENT IN AN ORGANIZED HEALTH CARE EDUCATION/TRAINING PROGRAM

## 2018-08-02 PROCEDURE — 84550 ASSAY OF BLOOD/URIC ACID: CPT | Performed by: STUDENT IN AN ORGANIZED HEALTH CARE EDUCATION/TRAINING PROGRAM
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
## 2018-08-02 NOTE — MR AVS SNAPSHOT
[address] 
[phone] Patient: Kleber Torres MRN: [id number] :1995 Visit Information Date & Time Provider Department Dept. Phone Encounter #  
 2018 10:05 AM Sonja Barney MD [facility] [phone] [id number]  
  
 2018  2:45 PM  
OB VISIT with Antony Robins DO [facility] ([facility]) Appt Note: ob visit 33[facility] [address] [place], [place] [phone]  
  
   
 [address] [place] [address] 3 [facility] [phone] Upcoming Health Maintenance Date Due  
 HPV Age 9Y-34Y (1 of 1 - Female 3 Dose Series) 2005 Influenza Age 5 to Adult 2018 PAP AKA CERVICAL CYTOLOGY 2020 DTaP/Tdap/Td series (3 - Td) 2028 Allergies as of 2018  Review Complete On: 2018 By: Stephani Kraft LPN No Known Allergies Current Immunizations  Reviewed on 2018 Name Date Influenza Vaccine PF 2014  2:32 PM  
 Rho(D) Immune Globulin - IM 2018 Tdap 2018, 2014  2:30 PM  
  
 Not reviewed this visit You Were Diagnosed With   
  
 Codes Comments 39 weeks gestation of pregnancy    -  Primary ICD-10-CM: Z3A.39 
ICD-9-CM: V22.2 Vitals BP Pulse Temp Resp Height(growth percentile) Weight(growth percentile) 118/75 (BP 1 Location: Left arm, BP Patient Position: Sitting) (!) 104 98.3 °F (36.8 °C) (Oral) 16 5' 5\" (1.651 m) 177 lb (80.3 kg) LMP SpO2 BMI OB Status Smoking Status 10/21/2017 (Exact Date) 99% 29.45 kg/m2 Pregnant Former Smoker Vitals History BMI and BSA Data Body Mass Index Body Surface Area  
 29.45 kg/m 2 1.92 m 2 Preferred Pharmacy Pharmacy Name Phone [id number] [place], [address] AT [address] [phone] Your Updated Medication List  
  
   
 This list is accurate as of 18 10:13 AM.  Always use your most recent med list.  
  
  
  
  
 doxylamine succinate 25 mg tablet Commonly known as:  Lizy Marta Take 1 Tab by mouth nightly as needed for Sleep.  
  
 prenatal vit-calcium-iron-fa 27 mg iron- 1 mg Tab Commonly known as:  PRENATAL PLUS with CALCIUM Take 1 Tab by mouth daily. pyridoxine (vitamin B6) 50 mg tablet Commonly known as:  VITAMIN B-6 Take 1 Tab by mouth three (3) times daily as needed. We Performed the Following AMB POC URINALYSIS DIP STICK AUTO W/O MICRO [64938 CPT(R)] CULTURE, URINE [id number] CPT(R)] Patient Instructions Week 39 of Your Pregnancy: Care Instructions Your Care Instructions During these final weeks, you may feel anxious to see your new baby.  babies often look different from what you see in pictures or movies. Right after birth, their heads may have a strange shape. Their eyes may be puffy. And their genitals may be swollen. They may also have very dry skin, or red marks on the eyelids, nose, or neck. Still, most parents think their babies are beautiful. Follow-up care is a key part of your treatment and safety. Be sure to make and go to all appointments, and call your doctor if you are having problems. It's also a good idea to know your test results and keep a list of the medicines you take. How can you care for yourself at home? Prepare to breastfeed · If you are breastfeeding, continue to eat healthy foods. · Avoid alcohol, cigarettes, and drugs. This includes prescription and over-the-counter medicines. · You can help prevent sore nipples if you feed your baby in the correct position. Nurses will help you learn to do this. · Your  will need to be fed about every 1½ to 3 hours. Choose the right birth control after your baby is born · Women who are breastfeeding can still get pregnant. Use birth control if you don't want to get pregnant. · Intrauterine devices (IUDs) work for women who want to wait at least 2 years before getting pregnant again. They are safe to use while you are breastfeeding. · Depo-Provera can be used while you are breastfeeding. It is a shot you get every 3 months. · Birth control pills work well. But you need a different kind of pill while you are breastfeeding. And when you start taking these pills, you need to make sure to use another type of birth control until you start your second pack. · Diaphragms, cervical caps, tubal implants, and condoms with spermicide work less well after birth. If you have a diaphragm or cervical cap, you will need to have it refitted. · Tubal ligation (tying your tubes) and vasectomy are both permanent. These are good options if you are sure you are done having children. Where can you learn more? Go to http://luis-keron.info/. Enter C214 in the search box to learn more about \"Week 39 of Your Pregnancy: Care Instructions. \" Current as of: November 21, 2017 Content Version: 11.7 © 7560-6621 [employer]. Care instructions adapted under license by [employer] (which disclaims liability or warranty for this information). If you have questions about a medical condition or this instruction, always ask your healthcare professional. [address] any warranty or liability for your use of this information. Introducing [facility]! Dear [employer]dom: 
Thank you for requesting a [employer] account. Our records indicate that you already have an active [employer] account. You can access your account anytime at https://[email or web address]. [employer] Did you know that you can access your hospital and ER discharge instructions at any time in [employer]? You can also review all of your test results from your hospital stay or ER visit. Additional Information If you have questions, please visit the Frequently Asked Questions section of the [employer] website at https://[employer]. [employer]. [email or web address] Remember, [employer] is NOT to be used for urgent needs. For medical emergencies, dial 911. Now available from your iPhone and Android! Please provide this summary of care documentation to your next provider. Your primary care clinician is listed as Jaimie Dias. If you have any questions after today's visit, please call [phone].

## 2018-08-02 NOTE — PATIENT INSTRUCTIONS
Week 39 of Your Pregnancy: Care Instructions  Your Care Instructions    During these final weeks, you may feel anxious to see your new baby. [place] babies often look different from what you see in pictures or movies. Right after birth, their heads may have a strange shape. Their eyes may be puffy. And their genitals may be swollen. They may also have very dry skin, or red marks on the eyelids, nose, or neck. Still, most parents think their babies are beautiful. Follow-up care is a key part of your treatment and safety. Be sure to make and go to all appointments, and call your doctor if you are having problems. It's also a good idea to know your test results and keep a list of the medicines you take. How can you care for yourself at home? Prepare to breastfeed  · If you are breastfeeding, continue to eat healthy foods. · Avoid alcohol, cigarettes, and drugs. This includes prescription and over-the-counter medicines. · You can help prevent sore nipples if you feed your baby in the correct position. Nurses will help you learn to do this. · Your  will need to be fed about every 1½ to 3 hours. Choose the right birth control after your baby is born  · Women who are breastfeeding can still get pregnant. Use birth control if you don't want to get pregnant. · Intrauterine devices (IUDs) work for women who want to wait at least 2 years before getting pregnant again. They are safe to use while you are breastfeeding. · Depo-Provera can be used while you are breastfeeding. It is a shot you get every 3 months. · Birth control pills work well. But you need a different kind of pill while you are breastfeeding. And when you start taking these pills, you need to make sure to use another type of birth control until you start your second pack. · Diaphragms, cervical caps, tubal implants, and condoms with spermicide work less well after birth.  If you have a diaphragm or cervical cap, you will need to have it refitted. · Tubal ligation (tying your tubes) and vasectomy are both permanent. These are good options if you are sure you are done having children. Where can you learn more? Go to http://luis-keron.info/. Enter X716 in the search box to learn more about \"Week 39 of Your Pregnancy: Care Instructions. \"  Current as of: November 21, 2017  Content Version: 11.7  © 9550-9049 [employer], [employer]. Care instructions adapted under license by [employer] (which disclaims liability or warranty for this information). If you have questions about a medical condition or this instruction, always ask your healthcare professional. [address] any warranty or liability for your use of this information.

## 2018-08-02 NOTE — IP AVS SNAPSHOT
[address] 
[phone] Patient: Katie Hopper MRN: [id number] :1995 A check cholo indicates which time of day the medication should be taken. My Medications ASK your doctor about these medications Instructions Each Dose to Equal  
 Morning Noon Evening Bedtime  
 doxylamine succinate 25 mg tablet Commonly known as:  Magaly Merkeeley Your last dose was: Your next dose is: Take 1 Tab by mouth nightly as needed for Sleep. 25 mg  
    
   
   
   
  
 prenatal vit-calcium-iron-fa 27 mg iron- 1 mg Tab Commonly known as:  PRENATAL PLUS with CALCIUM Your last dose was: Your next dose is: Take 1 Tab by mouth daily. 1 Tab  
    
   
   
   
  
 pyridoxine (vitamin B6) 50 mg tablet Commonly known as:  VITAMIN B-6 Your last dose was: Your next dose is: Take 1 Tab by mouth three (3) times daily as needed.   
 50 mg

## 2018-08-02 NOTE — PROGRESS NOTES
Chief Complaint   Patient presents with    Routine Prenatal Visit     1. Have you been to the ER, urgent care clinic since your last visit? Hospitalized since your last visit? No    2. Have you seen or consulted any other health care providers outside of the [address] [place] [address] since your last visit? Include any pap smears or colon screening.  No

## 2018-08-02 NOTE — IP AVS SNAPSHOT
[facility] 
 
 
 [address] [place] [address] 
[phone] Patient: Jesus Hernandez MRN: [id number] :1995 About your hospitalization You were admitted on:  N/A You last received care in the:  [facility] 2 LABOR & DELIVERY You were discharged on:  August 3, 2018 Why you were hospitalized Your primary diagnosis was:  Not on File Follow-up Information Follow up With Details Comments Contact Info MD Aliza Harrison Wisdom [phone] 
[phone] Kaleb Goldstein MD Call As needed Aliza Garciaduncan María Arceo Wisdom [address] [place] [address] 
[phone] Your Scheduled Appointments 2018  2:45 PM EDT  
OB VISIT with Kathleen Nassar DO [address] ([address])  
 [address] [place] [address]  
[phone] Discharge Orders None A check cholo indicates which time of day the medication should be taken. My Medications ASK your doctor about these medications Instructions Each Dose to Equal  
 Morning Noon Evening Bedtime  
 doxylamine succinate 25 mg tablet Commonly known as:  Alexandria Johns Your last dose was: Your next dose is: Take 1 Tab by mouth nightly as needed for Sleep. 25 mg  
    
   
   
   
  
 prenatal vit-calcium-iron-fa 27 mg iron- 1 mg Tab Commonly known as:  PRENATAL PLUS with CALCIUM Your last dose was: Your next dose is: Take 1 Tab by mouth daily. 1 Tab  
    
   
   
   
  
 pyridoxine (vitamin B6) 50 mg tablet Commonly known as:  VITAMIN B-6 Your last dose was: Your next dose is: Take 1 Tab by mouth three (3) times daily as needed. 50 mg Discharge Instructions Logical Therapeuticshart Activation Thank you for requesting access to LawPal.  Please follow the instructions below to securely access and download your online medical record. [employer] allows you to send messages to your doctor, view your test results, renew your prescriptions, schedule appointments, and more. How Do I Sign Up? 1. In your internet browser, go to [email or web address] 
2. Click on the First Time User? Click Here link in the Sign In box. You will be redirect to the New Member Sign Up page. 3. Enter your [employer] Access Code exactly as it appears below. You will not need to use this code after youve completed the sign-up process. If you do not sign up before the expiration date, you must request a new code. [employer] Access Code: Activation code not generated Current [employer] Status: Active (This is the date your [employer] access code will ) 4. Enter the last four digits of your Social Security Number (xxxx) and Date of Birth (mm/dd/yyyy) as indicated and click Submit. You will be taken to the next sign-up page. 5. Create a [employer] ID. This will be your [employer] login ID and cannot be changed, so think of one that is secure and easy to remember. 6. Create a [employer] password. You can change your password at any time. 7. Enter your Password Reset Question and Answer. This can be used at a later time if you forget your password. 8. Enter your e-mail address. You will receive e-mail notification when new information is available in [address] 9. Click Sign Up. You can now view and download portions of your medical record. 10. Click the Download Summary menu link to download a portable copy of your medical information. Additional Information If you have questions, please visit the Frequently Asked Questions section of the [employer] website at [email or web address] Remember, [employer] is NOT to be used for urgent needs. For medical emergencies, dial 911. Week 39 of Your Pregnancy: Care Instructions Your Care Instructions During these final weeks, you may feel anxious to see your new baby.  babies often look different from what you see in pictures or movies. Right after birth, their heads may have a strange shape. Their eyes may be puffy. And their genitals may be swollen. They may also have very dry skin, or red marks on the eyelids, nose, or neck. Still, most parents think their babies are beautiful. Follow-up care is a key part of your treatment and safety. Be sure to make and go to all appointments, and call your doctor if you are having problems. It's also a good idea to know your test results and keep a list of the medicines you take. How can you care for yourself at home? Prepare to breastfeed · If you are breastfeeding, continue to eat healthy foods. · Avoid alcohol, cigarettes, and drugs. This includes prescription and over-the-counter medicines. · You can help prevent sore nipples if you feed your baby in the correct position. Nurses will help you learn to do this. · Your  will need to be fed about every 1½ to 3 hours. Choose the right birth control after your baby is born · Women who are breastfeeding can still get pregnant. Use birth control if you don't want to get pregnant. · Intrauterine devices (IUDs) work for women who want to wait at least 2 years before getting pregnant again. They are safe to use while you are breastfeeding. · Depo-Provera can be used while you are breastfeeding. It is a shot you get every 3 months. · Birth control pills work well. But you need a different kind of pill while you are breastfeeding. And when you start taking these pills, you need to make sure to use another type of birth control until you start your second pack. · Diaphragms, cervical caps, tubal implants, and condoms with spermicide work less well after birth. If you have a diaphragm or cervical cap, you will need to have it refitted. · Tubal ligation (tying your tubes) and vasectomy are both permanent. These are good options if you are sure you are done having children. Where can you learn more? Go to http://luis-keron.info/. Enter V411 in the search box to learn more about \"Week 39 of Your Pregnancy: Care Instructions. \" Current as of: November 21, 2017 Content Version: 11.7 © 6068-7872 Do It Original. Care instructions adapted under license by [employer] (which disclaims liability or warranty for this information). If you have questions about a medical condition or this instruction, always ask your healthcare professional. [address] any warranty or liability for your use of this information. Introducing [facility]! Dear Atha Oppenheim: 
Thank you for requesting a Snaapiq account. Our records indicate that you already have an active Snaapiq account. You can access your account anytime at [email or web address] Did you know that you can access your hospital and ER discharge instructions at any time in Snaapiq? You can also review all of your test results from your hospital stay or ER visit. Additional Information If you have questions, please visit the Frequently Asked Questions section of the Snaapiq website at [email or web address] Remember, Snaapiq is NOT to be used for urgent needs. For medical emergencies, dial 911. Now available from your iPhone and Android! Introducing Yusuf Dale As a Smitha Jacob patient, I wanted to make you aware of our electronic visit tool called Yusuf Cheleaaron. [facility] 24/7 allows you to connect within minutes with a medical provider 24 hours a day, seven days a week via a mobile device or tablet or logging into a secure website from your computer. You can access Yusuf Dale from anywhere in the [place].  
 
A virtual visit might be right for you when you have a simple condition and feel like you just dont want to get out of bed, or cant get away from work for an appointment, when your regular [employer] provider is not available (evenings, weekends or holidays), or when youre out of town and need minor care. Electronic visits cost only $49 and if the [employer] 24/7 provider determines a prescription is needed to treat your condition, one can be electronically transmitted to a nearby pharmacy*. Please take a moment to enroll today if you have not already done so. The enrollment process is free and takes just a few minutes. To enroll, please download the [employer] 24/7 tarun to your tablet or phone, or visit [email or web address] to enroll on your computer. And, as an [address] [place] [address] patient with a L[employer]istas account, the results of your visits will be scanned into your electronic medical record and your primary care provider will be able to view the scanned results. We urge you to continue to see your regular [employer] provider for your ongoing medical care. And while your primary care provider may not be the one available when you seek a Searchleswhitleyfin virtual visit, the peace of mind you get from getting a real diagnosis real time can be priceless. For more information on imagine, view our Frequently Asked Questions (FAQs) at [email or web address]. Sincerely, 
 
Ray Landry MD 
Chief Medical Officer [employer] *:  certain medications cannot be prescribed via imagine Providers Seen During Your Hospitalization Provider Specialty Primary office phone Amelia Knowles MD Obstetrics & Gynecology [phone] Your Primary Care Physician (PCP) Primary Care Physician Office Phone Office Fax 1010 [facility], [address] [place] [address] [phone] You are allergic to the following No active allergies Recent Documentation Height Weight BMI OB Status Smoking Status 1.651 m 80.3 kg 29.45 kg/m2 Pregnant Former Smoker Emergency Contacts Name Discharge Info Relation Home Work Mobile MaywoodLeatha DISCHARGE CAREGIVER [3] Mother [14] [phone] MaywoodHaim DISCHARGE CAREGIVER [3] Father [15] [phone] Patient Belongings The following personal items are in your possession at time of discharge: 
                             
 
  
  
 Please provide this summary of care documentation to your next provider. Signatures-by signing, you are acknowledging that this After Visit Summary has been reviewed with you and you have received a copy. Patient Signature:  ____________________________________________________________ Date:  ____________________________________________________________  
  
[facility] Provider Signature:  ____________________________________________________________ Date:  ____________________________________________________________

## 2018-08-03 ENCOUNTER — ROUTINE PRENATAL (OUTPATIENT)
Dept: FAMILY MEDICINE CLINIC | Age: 23
End: 2018-08-03

## 2018-08-03 VITALS
WEIGHT: 179.8 LBS | HEIGHT: 65 IN | RESPIRATION RATE: 18 BRPM | TEMPERATURE: 98.3 F | HEART RATE: 114 BPM | BODY MASS INDEX: 29.96 KG/M2 | OXYGEN SATURATION: 97 % | SYSTOLIC BLOOD PRESSURE: 126 MMHG | DIASTOLIC BLOOD PRESSURE: 89 MMHG

## 2018-08-03 VITALS
WEIGHT: 177 LBS | OXYGEN SATURATION: 99 % | HEART RATE: 107 BPM | RESPIRATION RATE: 16 BRPM | BODY MASS INDEX: 29.49 KG/M2 | SYSTOLIC BLOOD PRESSURE: 136 MMHG | HEIGHT: 65 IN | TEMPERATURE: 98.2 F | DIASTOLIC BLOOD PRESSURE: 85 MMHG

## 2018-08-03 DIAGNOSIS — Z3A.39 39 WEEKS GESTATION OF PREGNANCY: Primary | ICD-10-CM

## 2018-08-03 PROCEDURE — 99285 EMERGENCY DEPT VISIT HI MDM: CPT

## 2018-08-03 PROCEDURE — 59025 FETAL NON-STRESS TEST: CPT

## 2018-08-03 NOTE — PROGRESS NOTES
2145- Patient arrives from home with increase in contractions since recent intercourse, states had some mild spotting, no leaking of fluid. 2150- Resident notified of patients arrival 
2152- Dr Miquel Thorpe notified of patients arrival, cervical exam and B/P. Will repeat pressure. 2255- Labs drawn per order. 2335- EFM removed per Dr Miquel Thorpe waiting for lab results Karin- Resident notified of lab results. Nikolay 56- Discharge instructions given. No questions at this time.

## 2018-08-03 NOTE — H&P
History & Physical 
 
Name: Bozena Foster MRN: [id number]  SSN: [id number] YOB: 1995  Age: 20 y.o. Sex: female Subjective:  
 
Reason for Admission:  Pregnancy and Contractions History of Present Illness: Ms. Chris Heaton is a 20 y.o.   female with an estimated gestational age of 43w3d with Estimated Date of Delivery: 18. Patient complains of moderate contractions since this evening. States contractions began after intercourse. denies Pregnancy has been uncomplicated thus far. Maternal hx of chlamydia in first trimester, hx of polysubstance abuse prior to pregnancy and hx of multiple psych disorders. Patient denies abdominal pain  , chest pain, headache , right upper quadrant pain  , shortness of breath, swelling, vaginal bleeding , vaginal leaking of fluid  and blood in urine, burning with urination, dysuria, urinary frequency and urinary urgency. OB History  Para Term  AB Living 2 1 1   1 SAB TAB Ectopic Molar Multiple Live Births # Outcome Date GA Lbr Ray/2nd Weight Sex Delivery Anes PTL Lv  
2 Current 1 Term 14   6 lb 8 oz (2.948 kg) M VAGINAL DELI Birth Comments: Delivered at home, no prenatal care Past Medical History:  
Diagnosis Date  Atrial fibrillation ([address] 2017  Borderline personality disorder   
 vs. traits thereof  Chlamydia infection affecting pregnancy 2017  Chronic tonsillitis s/p tonsillectomy   Conduct disorder  History of inadequate prenatal care 3/13/2014  Migraine  Mood disorder ([address] 2015  Oppositional defiant disorder  Substance abuse   
 mj, alcohol, heroin Past Surgical History:  
Procedure Laterality Date  HX OTHER SURGICAL    
 HX TONSILLECTOMY   Social History Occupational History  Not on file. Social History Main Topics  Smoking status: Former Smoker Packs/day: 0.25   Quit date: 2016  Smokeless tobacco: Never Used  Alcohol use Yes Comment: socially  Drug use: No  
   Comment: states no longer doing marijuana.  Sexual activity: Yes  
  Partners: Male Birth control/ protection: None Family History Problem Relation Age of Onset  Anxiety Mother  Depression Mother  Alcohol abuse Mother   
  still using  Anxiety Father  Lupus Maternal Grandmother  Depression Sister  Depression Sister No Known Allergies Prior to Admission medications Medication Sig Start Date End Date Taking? Authorizing Provider  
prenatal vit-calcium-iron-fa (PRENATAL PLUS WITH CALCIUM) 27 mg iron- 1 mg tab Take 1 Tab by mouth daily. 17  Yes Monica Bundy MD  
doxylamine succinate (UNISOM) 25 mg tablet Take 1 Tab by mouth nightly as needed for Sleep. 17   Monica Bundy MD  
pyridoxine, vitamin B6, (VITAMIN B-6) 50 mg tablet Take 1 Tab by mouth three (3) times daily as needed. 17   Monica Bundy MD  
  
 
Review of Systems: A comprehensive review of systems was negative except for that written in the History of Present Illness. Objective:  
 
Vitals:   
Vitals:  
 18 2219 18 2232 18 2234 18 0004 BP: 149/90  150/88 136/85 Pulse: (!) 104  100 (!) 107 Resp:      
Temp:      
SpO2:  99% Weight:      
Height:      
  
Temp (24hrs), Av.3 °F (36.8 °C), Min:98.2 °F (36.8 °C), Max:98.3 °F (36.8 °C) BP  Min: 118/75  Max: 150/88 Physical Exam: 
Heart: Regular rate and rhythm or S1S2 present Lung: clear to auscultation throughout lung fields, no wheezes, no rales, no rhonchi and normal respiratory effort Back: costovertebral angle tenderness absent Abdomen: soft, nontender Fundus: soft and non tender Perineum: blood absent, amniotic fluid absent Cervical Exam: Closed/Thick/High Lower Extremities:  - Edema No 
 - Patellar Reflexes: absent - Clonus: absent Membranes: Intact Uterine Activity:  Frequency: Every 3-6 minutes Intensity: mild Fetal Heart Rate:  Reactive Baseline: 150s per minute Lab/Data Review: 
Recent Results (from the past 24 hour(s)) AMB POC URINALYSIS DIP STICK AUTO W/O MICRO Collection Time: 08/02/18 10:25 AM  
Result Value Ref Range Color (UA POC) Yellow Clarity (UA POC) Clear Glucose (UA POC) Negative Negative Bilirubin (UA POC) 1+ Negative Ketones (UA POC) Negative Negative Specific gravity (UA POC) 1.025 1.001 - 1.035 Blood (UA POC) Trace Negative pH (UA POC) 6.5 4.6 - 8.0 Protein (UA POC) 1+ Negative Urobilinogen (UA POC) 1 mg/dL 0.2 - 1 Nitrites (UA POC) Negative Negative Leukocyte esterase (UA POC) 1+ Negative CBC W/O DIFF Collection Time: 08/02/18 10:48 PM  
Result Value Ref Range WBC 20.0 (H) 3.6 - 11.0 K/uL  
 RBC 4.22 3.80 - 5.20 M/uL  
 HGB 11.8 11.5 - 16.0 g/dL HCT 36.6 35.0 - 47.0 % MCV 86.7 80.0 - 99.0 FL  
 MCH 28.0 26.0 - 34.0 PG  
 MCHC 32.2 30.0 - 36.5 g/dL  
 RDW 13.5 11.5 - 14.5 % PLATELET 030 163 - 144 K/uL MPV 11.3 8.9 - 12.9 FL  
 NRBC 0.0 0  WBC ABSOLUTE NRBC 0.00 0.00 - 0.01 K/uL METABOLIC PANEL, COMPREHENSIVE Collection Time: 08/02/18 10:48 PM  
Result Value Ref Range Sodium 136 136 - 145 mmol/L Potassium 3.6 3.5 - 5.1 mmol/L Chloride 104 97 - 108 mmol/L  
 CO2 23 21 - 32 mmol/L Anion gap 9 5 - 15 mmol/L Glucose 107 (H) 65 - 100 mg/dL BUN 3 (L) 6 - 20 MG/DL Creatinine 0.72 0.55 - 1.02 MG/DL  
 BUN/Creatinine ratio 4 (L) 12 - 20 GFR est AA >60 >60 ml/min/1.73m2 GFR est non-AA >60 >60 ml/min/1.73m2 Calcium 8.6 8.5 - 10.1 MG/DL Bilirubin, total 0.3 0.2 - 1.0 MG/DL  
 ALT (SGPT) 13 12 - 78 U/L  
 AST (SGOT) 11 (L) 15 - 37 U/L Alk. phosphatase 121 (H) 45 - 117 U/L Protein, total 6.2 (L) 6.4 - 8.2 g/dL Albumin 2.9 (L) 3.5 - 5.0 g/dL Globulin 3.3 2.0 - 4.0 g/dL  A-G Ratio 0.9 (L) 1.1 - 2.2 PROTEIN/CREATININE RATIO, URINE Collection Time: 18 10:48 PM  
Result Value Ref Range Protein, urine random <6 0.0 - 11.9 mg/dL Creatinine, urine <13.00 mg/dL Protein/Creat. urine Ratio Cannot be calculated LD Collection Time: 18 10:48 PM  
Result Value Ref Range  81 - 246 U/L  
URIC ACID Collection Time: 18 10:48 PM  
Result Value Ref Range Uric acid 4.0 2.6 - 6.0 MG/DL  
DRUG SCREEN, URINE Collection Time: 18 10:48 PM  
Result Value Ref Range AMPHETAMINES NEGATIVE  NEG    
 BARBITURATES NEGATIVE  NEG BENZODIAZEPINES NEGATIVE  NEG    
 COCAINE NEGATIVE  NEG METHADONE NEGATIVE  NEG    
 OPIATES NEGATIVE  NEG    
 PCP(PHENCYCLIDINE) NEGATIVE  NEG    
 THC (TH-CANNABINOL) NEGATIVE  NEG Drug screen comment (NOTE) Assessment and Plan: Ms. Yolanda Nick is a 20 y.o.   female with an estimated gestational age of 43w3d who presents to L&D for labor r/o after following post coital uterine contractions. 1. Labor R/O- False Labor - Irregular cx likely triggered by coitus - Pt BPs noted in 140s/90s. No hx or dx of PIH or pre-eclampsia. PCP office vists w/ BPs in 130s/70s 
 - Encourage hydration, monitor on toco, follow-up cbc, cmp, uds, ld, uric acid and urine Pr:Cr Patient discussed with Dr. Britney Pizano MD 
Family Medicine Resident
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
## 2018-08-03 NOTE — DISCHARGE INSTRUCTIONS
[employer] Activation    Thank you for requesting access to [employer]. Please follow the instructions below to securely access and download your online medical record. [employer] allows you to send messages to your doctor, view your test results, renew your prescriptions, schedule appointments, and more. How Do I Sign Up? 1. In your internet browser, go to [email or web address]  2. Click on the First Time User? Click Here link in the Sign In box. You will be redirect to the New Member Sign Up page. 3. Enter your [employer] Access Code exactly as it appears below. You will not need to use this code after youve completed the sign-up process. If you do not sign up before the expiration date, you must request a new code. [employer] Access Code: Activation code not generated  Current [employer] Status: Active (This is the date your [employer] access code will )    4. Enter the last four digits of your Social Security Number (xxxx) and Date of Birth (mm/dd/yyyy) as indicated and click Submit. You will be taken to the next sign-up page. 5. Create a [employer] ID. This will be your [employer] login ID and cannot be changed, so think of one that is secure and easy to remember. 6. Create a [employer] password. You can change your password at any time. 7. Enter your Password Reset Question and Answer. This can be used at a later time if you forget your password. 8. Enter your e-mail address. You will receive e-mail notification when new information is available in [address] 9. Click Sign Up. You can now view and download portions of your medical record. 10. Click the Download Summary menu link to download a portable copy of your medical information. Additional Information    If you have questions, please visit the Frequently Asked Questions section of the [employer] website at [email or web address] Remember, [employer] is NOT to be used for urgent needs. For medical emergencies, dial 911.          Week 39 of Your Pregnancy: Care Instructions  Your Care Instructions    During these final weeks, you may feel anxious to see your new baby. Edison babies often look different from what you see in pictures or movies. Right after birth, their heads may have a strange shape. Their eyes may be puffy. And their genitals may be swollen. They may also have very dry skin, or red marks on the eyelids, nose, or neck. Still, most parents think their babies are beautiful. Follow-up care is a key part of your treatment and safety. Be sure to make and go to all appointments, and call your doctor if you are having problems. It's also a good idea to know your test results and keep a list of the medicines you take. How can you care for yourself at home? Prepare to breastfeed  · If you are breastfeeding, continue to eat healthy foods. · Avoid alcohol, cigarettes, and drugs. This includes prescription and over-the-counter medicines. · You can help prevent sore nipples if you feed your baby in the correct position. Nurses will help you learn to do this. · Your  will need to be fed about every 1½ to 3 hours. Choose the right birth control after your baby is born  · Women who are breastfeeding can still get pregnant. Use birth control if you don't want to get pregnant. · Intrauterine devices (IUDs) work for women who want to wait at least 2 years before getting pregnant again. They are safe to use while you are breastfeeding. · Depo-Provera can be used while you are breastfeeding. It is a shot you get every 3 months. · Birth control pills work well. But you need a different kind of pill while you are breastfeeding. And when you start taking these pills, you need to make sure to use another type of birth control until you start your second pack. · Diaphragms, cervical caps, tubal implants, and condoms with spermicide work less well after birth. If you have a diaphragm or cervical cap, you will need to have it refitted.   · Tubal ligation (tying your tubes) and vasectomy are both permanent. These are good options if you are sure you are done having children. Where can you learn more? Go to http://luis-keron.info/. Enter U947 in the search box to learn more about \"Week 39 of Your Pregnancy: Care Instructions. \"  Current as of: November 21, 2017  Content Version: 11.7  © 0999-5905 [employer]. Care instructions adapted under license by [employer] (which disclaims liability or warranty for this information). If you have questions about a medical condition or this instruction, always ask your healthcare professional. [address] any warranty or liability for your use of this information.

## 2018-08-03 NOTE — DISCHARGE SUMMARY
Antepartum Discharge Summary     Name: Mani Lion MRN: [id number]  SSN: [id number]    YOB: 1995  Age: 20 y.o. Sex: female      Admit Date: 8/2/2018    Discharge Date: 8/3/2018     Admitting Physician: Luciana Payton MD     Attending Physician:  Luciana Payton MD     Admission Diagnoses: Labor R/O    Discharge Diagnoses:   False labor  Problem List as of 8/3/2018  Date Reviewed: 11/30/2017          Codes Class Noted - Resolved    Rh negative state in antepartum period ICD-10-CM: O09.899, Z67.91  ICD-9-CM: V23.89  3/29/2018 - Present        Chlamydia infection affecting pregnancy ICD-10-CM: O98.819, A74.9  ICD-9-CM: 647.60, 079.98  12/12/2017 - Present    Overview Signed 12/12/2017  4:04 PM by Angel Torres MD     In the first trimester             Bacteriuria during pregnancy ICD-10-CM: O99.89, R82.71  ICD-9-CM: 646.50  12/8/2017 - Present    Overview Signed 12/8/2017 10:35 PM by Angel Torres MD     UCx with E.coli, treated with Keflex             Tachycardia ICD-10-CM: R00.0  ICD-9-CM: 785.0  11/30/2017 - Present    Overview Signed 3/29/2018  1:17 PM by Angel Torres MD     The patient reports having Afib. I personally talked to Dr. Sherri Mccabe office ( cardiologist) who said that the patient had tachycardia and was put on Metoprolol. He recommended to stop Metoprolol and no follow was indicated.               Polysubstance dependence ([facility] [address] ICD-10-CM: F19.20  ICD-9-CM: 304.80  9/7/2015 - Present        PTSD (post-traumatic stress disorder) ICD-10-CM: F43.10  ICD-9-CM: 309.81  9/7/2015 - Present        Alcohol intoxication ([facility] [address] ICD-10-CM: Q77.641  ICD-9-CM: 305.00  9/7/2015 - Present        Borderline personality disorder ICD-10-CM: F60.3  ICD-9-CM: 301.83  9/7/2015 - Present        Antisocial personality disorder ICD-10-CM: F60.2  ICD-9-CM: 301.7  9/7/2015 - Present        Oppositional defiant disorder ICD-10-CM: F91.3  ICD-9-CM: 313.81  9/7/2015 - Present Conduct disorder ICD-10-CM: F91.9  ICD-9-CM: 312.9  2015 - Present        Depression with suicidal ideation ICD-10-CM: F32.9, R45.851  ICD-9-CM: 040, V62.84  2015 - Present        Overdose ICD-10-CM: T50.901A  ICD-9-CM: 977.9, E980.5  2015 - Present        Migraine ICD-10-CM: A35.095  ICD-9-CM: 346.90  2015 - Present        Skin change ICD-10-CM: R23.9  ICD-9-CM: 782.9  2015 - Present        Mood disorder ([address] ICD-10-CM: F39  ICD-9-CM: 296.90  2015 - Present        RESOLVED: History of inadequate prenatal care ICD-10-CM: O09.30  ICD-9-CM: V23.7  3/13/2014 - 3/13/2014        RESOLVED: Tonsillitis, chronic ICD-10-CM: J35.01  ICD-9-CM: 474.00  2011 - 3/13/2014    Overview Signed 3/13/2014  1:54 PM by Dav Palumbo MD     S/p tonsillectomy                  No results found for: RUBELLAEXT, GRBSEXT    Immunization(s):   Immunization History   Administered Date(s) Administered    Influenza Vaccine PF 2014    Rho(D) Immune Globulin - IM 2018    Tdap 2014, 2018        Hospital Course:    Ms. Candis Cochran is a 20 y.o.   female with an estimated gestational age of 43w3d with Estimated Date of Delivery: 18. Patient complains of moderate contractions since this evening. States contractions began after intercourse. denies Pregnancy has been uncomplicated thus far. Maternal hx of chlamydia in first trimester, hx of polysubstance abuse prior to pregnancy and hx of multiple psych disorders. Patient denies abdominal pain  , chest pain, headache , right upper quadrant pain  , shortness of breath, swelling, vaginal bleeding , vaginal leaking of fluid  and blood in urine, burning with urination, dysuria, urinary frequency and urinary urgency.     A/P:     1. Labor R/O                       - Irregular cx likely triggered by coitus                       - Admission BPs de147l/90s. No hx or dx of PIH or pre-eclampsia.              - PCP office vists w/ BPs in 130s/70s.                        - cbc, cmp, uds, ld, uric acid and urine Pr:Cr grossly unremarkable            - Discharge to home with close PCP f/u                        Condition at Discharge: Stable    Patient Instructions:   Labor precaution discussed      Signed By:  Tyler Garcia MD    Family Medicine Resident

## 2018-08-03 NOTE — PROGRESS NOTES
I saw and evaluated the patient, performing the key elements of the service. I discussed the findings, assessment and plan with the resident and agree with the resident's findings and plan as documented in the resident's note. Pt with HA and some blurry vision but BP not elevated and at her baseline. [address] labs done <24 hours ago wnl. FHT's 140's and pt reports good fetal movement, denies vaginal bleeding, LOF and regular ctx. Pt advised to check BP at home and call with any elevated values, or if she develops any new or worsening symptoms. Labor precautions given.

## 2018-08-03 NOTE — MR AVS SNAPSHOT
[address] 
[phone] Patient: Ricardo Lynn MRN: [id number] :1995 Visit Information Date & Time Provider Department Dept. Phone Encounter #  
 8/3/2018  3:00 PM Alise Rubio MD [facility] [phone] [id number]  
  
 2018  2:45 PM  
OB VISIT with Brandee Robins DO [facility] ([address]) Appt Note: ob visit [facility] [address] [place] [address]  
[phone]  
  
   
 [address] [place] [address] 3 [facility] 99 [phone] Upcoming Health Maintenance Date Due  
 HPV Age 9Y-34Y (1 of 1 - Female 3 Dose Series) 2005 Influenza Age 5 to Adult 2018 PAP AKA CERVICAL CYTOLOGY 2020 DTaP/Tdap/Td series (3 - Td) 2028 Allergies as of 8/3/2018  Review Complete On: 8/3/2018 By: Kristan Childs No Known Allergies Current Immunizations  Reviewed on 2018 Name Date Influenza Vaccine PF 2014  2:32 PM  
 Rho(D) Immune Globulin - IM 2018 Tdap 2018, 2014  2:30 PM  
  
 Not reviewed this visit You Were Diagnosed With   
  
 Codes Comments 39 weeks gestation of pregnancy    -  Primary ICD-10-CM: Z3A.39 
ICD-9-CM: V22.2 Vitals BP Pulse Temp Resp Height(growth percentile) Weight(growth percentile) 126/89 (BP 1 Location: Right arm, BP Patient Position: Sitting) (!) 114 98.3 °F (36.8 °C) (Oral) 18 5' 5\" (1.651 m) 179 lb 12.8 oz (81.6 kg) LMP SpO2 BMI OB Status Smoking Status 10/21/2017 (Exact Date) 97% 29.92 kg/m2 Pregnant Former Smoker Vitals History BMI and BSA Data Body Mass Index Body Surface Area  
 29.92 kg/m 2 1.93 m 2 Preferred Pharmacy Pharmacy Name Phone [id number]- [place], [address] AT [address] [place] [phone] Your Updated Medication List  
  
   
 This list is accurate as of 8/3/18  5:02 PM.  Always use your most recent med list.  
  
  
  
  
 doxylamine succinate 25 mg tablet Commonly known as:  Tinnie Apple Take 1 Tab by mouth nightly as needed for Sleep.  
  
 prenatal vit-calcium-iron-fa 27 mg iron- 1 mg Tab Commonly known as:  PRENATAL PLUS with CALCIUM Take 1 Tab by mouth daily. pyridoxine (vitamin B6) 50 mg tablet Commonly known as:  VITAMIN B-6 Take 1 Tab by mouth three (3) times daily as needed. We Performed the Following AMB POC GLUCOSE TEST [95176 CPT(R)] AMB POC URINALYSIS DIP STICK AUTO W/O MICRO [02256 CPT(R)] Introducing [facility]! Dear Fransico Grant: 
Thank you for requesting a [employer] account. Our records indicate that you already have an active [employer] account. You can access your account anytime at [email or web address] Did you know that you can access your hospital and ER discharge instructions at any time in [employer]? You can also review all of your test results from your hospital stay or ER visit. Additional Information If you have questions, please visit the Frequently Asked Questions section of the [employer] website at [email or web address] Remember, [employer] is NOT to be used for urgent needs. For medical emergencies, dial 911. Now available from your iPhone and Android! Please provide this summary of care documentation to your next provider. Your primary care clinician is listed as Jp Juarez. If you have any questions after today's visit, please call [phone].

## 2018-08-03 NOTE — PROGRESS NOTES
Chief Complaint   Patient presents with    Follow-up     follow up from Hospital  39w4d No LOF or bleeding. + FM Patient complaining of shaking as if low blood sugar     1. Have you been to the ER, urgent care clinic since your last visit? Hospitalized since your last visit? Yes When: 17 Where: [facility] Reason: contractions    2. Have you seen or consulted any other health care providers outside of the [address] [place] [address] since your last visit? Include any pap smears or colon screening.  No

## 2018-08-05 LAB — BACTERIA UR CULT: NORMAL

## 2018-08-14 ENCOUNTER — TELEPHONE (OUTPATIENT)
Dept: FAMILY MEDICINE CLINIC | Age: 23
End: 2018-08-14

## 2018-08-15 NOTE — TELEPHONE ENCOUNTER
Attempted to call  the patient at [phone] multiple times. Left a message to call back the clinic. The patient is past due for her estimated delivery date. The last time the patient was seen in the clinic on 8/3/2018 at [facility] of 39w4 and was scheduled to follow up in 1 week but did not show up. Attempted to call the patient multiple times to follow up on the her status. Unsuccessfully called multiple times at [phone]. Called the patient's mother Clem Atkinson who is listed as an emergency contact) at [phone]. The mother reports that the patient was unavailable. I asked the mother if everything was OK with the patient and if she already delivered. The mom reports \"that her daughter is OK but she will call us herself to discuss further\". She said that she would inform the patient to call the practice. 5:07 PM  8/14/2018  Heather Wright MD      Addendum:  Attempted to call the patient's mother again but no answer, left a message. 20:32 PM  8/14/2018        Addendum:  Still awaiting the call from the patient, the patient has not called the clinic back. Attempted to call the patient again at [phone], left a message to call the clinic back.   Attempted to call the patient's mother Clem Atkinson) at [phone] x 2, left a message to call back the clinic.    5:51 PM  8/15/2018  Heather Wright MD

## 2018-08-16 ENCOUNTER — DOCUMENTATION ONLY (OUTPATIENT)
Dept: FAMILY MEDICINE CLINIC | Age: 23
End: 2018-08-16

## 2018-08-16 NOTE — PROGRESS NOTES
Realized pt has been lost to follow up. Have accessed patient chart several times in the last 3 days to attempt to call patient. Have called both patient and emergency contact number listed on file several times without an answer. Sent [employer] message and will await response. Dr. Serge Bueno also attempting to reach patient.

## 2018-08-20 NOTE — PROGRESS NOTES
I reviewed with the resident the medical history and the resident's findings on the physical examination. Monico Montoya discussed with the resident the patient's diagnosis and concur with the plan.      23yo  @ 39.3 by LMP=first tri sono  1.  IUP: GTT WNL, anatomy normal, s/p tdap, GBS neg  2.  Rh neg: s/p rhogam on 18  3.  Hx chlamydia: treated with neg BHAVIK, repeat neg  4.  Hx UTI: treated and neg BHAVIK  5.  Hx substance abuse: UDS's negative in pregnancy, checking Q trimester and at time of labor   6.  Psych disorder: will need close f/u for PP depression screening   7.  Hx tachycardia: per pt's cardiologist this is due to her hx of alcohol abuse (Pt reports a hx of Afib which cardiology denies)

## 2020-01-07 ENCOUNTER — TELEPHONE (OUTPATIENT)
Dept: FAMILY MEDICINE CLINIC | Age: 25
End: 2020-01-07

## 2020-01-07 ENCOUNTER — OFFICE VISIT (OUTPATIENT)
Dept: FAMILY MEDICINE CLINIC | Age: 25
End: 2020-01-07

## 2020-01-07 VITALS
SYSTOLIC BLOOD PRESSURE: 109 MMHG | HEART RATE: 90 BPM | WEIGHT: 158 LBS | RESPIRATION RATE: 18 BRPM | TEMPERATURE: 98.5 F | BODY MASS INDEX: 26.33 KG/M2 | HEIGHT: 65 IN | OXYGEN SATURATION: 97 % | DIASTOLIC BLOOD PRESSURE: 72 MMHG

## 2020-01-07 DIAGNOSIS — K76.9 LIVER LESION, RIGHT LOBE: ICD-10-CM

## 2020-01-07 DIAGNOSIS — R10.11 RUQ PAIN: ICD-10-CM

## 2020-01-07 DIAGNOSIS — R11.2 NAUSEA AND VOMITING, INTRACTABILITY OF VOMITING NOT SPECIFIED, UNSPECIFIED VOMITING TYPE: Primary | ICD-10-CM

## 2020-01-07 RX ORDER — PANTOPRAZOLE SODIUM 20 MG/1
20 TABLET, DELAYED RELEASE ORAL
Qty: 30 TAB | Refills: 2 | Status: SHIPPED | OUTPATIENT
Start: 2020-01-07 | End: 2020-01-10

## 2020-01-07 RX ORDER — TEMAZEPAM 15 MG/1
CAPSULE ORAL
COMMUNITY
Start: 2020-01-03 | End: 2021-02-16

## 2020-01-07 RX ORDER — VENLAFAXINE HYDROCHLORIDE 75 MG/1
CAPSULE, EXTENDED RELEASE ORAL
COMMUNITY
Start: 2019-12-18 | End: 2021-02-16

## 2020-01-07 RX ORDER — HYDROXYZINE 50 MG/1
TABLET, FILM COATED ORAL
COMMUNITY
Start: 2019-12-26 | End: 2021-02-16
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
## 2020-01-07 NOTE — PROGRESS NOTES
Identified Patient with two Patient identifiers (Name and ). Two Patient Identifiers confirmed. Reviewed record in preparation for visit and have obtained necessary documentation. Chief Complaint   Patient presents with    Mass     Patient went to ER 2 weeks ago after feeling nausea, abdominal pain, and fatigue. - found to have a right ovarian luteum cyst and inteterminant 11mm hypoattenuating lesion in the right hepatic mo - OBGYN cleared her for the ovarian cyst but she needs follow up for liver lesion        Visit Vitals  /72 (BP 1 Location: Right arm, BP Patient Position: Sitting)   Pulse 90   Temp 98.5 °F (36.9 °C) (Oral)   Resp 18   Ht 5' 5\" (1.651 m)   Wt 158 lb (71.7 kg)   SpO2 97%   Breastfeeding No   BMI 26.29 kg/m²       1. Have you been to the ER, urgent care clinic since your last visit? Hospitalized since your last visit? Yes, Patient seen at [facility] ER for abdominal pain and OBGYN for ovarian cyst.    2. Have you seen or consulted any other health care providers outside of the [address] [place] [address] since your last visit? Include any pap smears or colon screening. Yes, see above.
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
## 2020-01-07 NOTE — TELEPHONE ENCOUNTER
Yanely Gonzalez with Coordination of Care calling,      Notes order for abdominal ultrasound is needed for appt in the morning at 8:00am    Questions call/ [phone]

## 2020-01-08 ENCOUNTER — PATIENT MESSAGE (OUTPATIENT)
Dept: FAMILY MEDICINE CLINIC | Age: 25
End: 2020-01-08

## 2020-01-08 ENCOUNTER — HOSPITAL ENCOUNTER (OUTPATIENT)
Dept: ULTRASOUND IMAGING | Age: 25
Discharge: HOME | End: 2020-01-08
Attending: STUDENT IN AN ORGANIZED HEALTH CARE EDUCATION/TRAINING PROGRAM
Payer: MEDICAID

## 2020-01-08 LAB
ALBUMIN SERPL-MCNC: 4.4 G/DL (ref 3.5–5.5)
ALBUMIN/GLOB SERPL: 2.3 {RATIO} (ref 1.2–2.2)
ALP SERPL-CCNC: 70 IU/L (ref 39–117)
ALT SERPL-CCNC: 39 IU/L (ref 0–32)
AST SERPL-CCNC: 28 IU/L (ref 0–40)
BILIRUB SERPL-MCNC: 0.3 MG/DL (ref 0–1.2)
BUN SERPL-MCNC: 3 MG/DL (ref 6–20)
BUN/CREAT SERPL: 4 (ref 9–23)
CALCIUM SERPL-MCNC: 8.8 MG/DL (ref 8.7–10.2)
CHLORIDE SERPL-SCNC: 105 MMOL/L (ref 96–106)
CO2 SERPL-SCNC: 23 MMOL/L (ref 20–29)
CREAT SERPL-MCNC: 0.82 MG/DL (ref 0.57–1)
ERYTHROCYTE [DISTWIDTH] IN BLOOD BY AUTOMATED COUNT: 11.8 % (ref 11.7–15.4)
GLOBULIN SER CALC-MCNC: 1.9 G/DL (ref 1.5–4.5)
GLUCOSE SERPL-MCNC: 102 MG/DL (ref 65–99)
HCT VFR BLD AUTO: 39.6 % (ref 34–46.6)
HGB BLD-MCNC: 13.4 G/DL (ref 11.1–15.9)
MCH RBC QN AUTO: 29.3 PG (ref 26.6–33)
MCHC RBC AUTO-ENTMCNC: 33.8 G/DL (ref 31.5–35.7)
MCV RBC AUTO: 87 FL (ref 79–97)
PLATELET # BLD AUTO: 295 X10E3/UL (ref 150–450)
POTASSIUM SERPL-SCNC: 4.2 MMOL/L (ref 3.5–5.2)
PROT SERPL-MCNC: 6.3 G/DL (ref 6–8.5)
RBC # BLD AUTO: 4.57 X10E6/UL (ref 3.77–5.28)
SODIUM SERPL-SCNC: 143 MMOL/L (ref 134–144)
WBC # BLD AUTO: 9.5 X10E3/UL (ref 3.4–10.8)

## 2020-01-08 PROCEDURE — 76705 ECHO EXAM OF ABDOMEN: CPT

## 2020-01-08 NOTE — PROGRESS NOTES
[address] Medicine Residency Attending Addendum:  I saw and evaluated the patient on the day of the encounter with Renee Barriga DO  , performing the key elements of the service. I discussed the findings, assessment and plan with the resident and agree with the resident's findings and plan as documented in the resident's note.       Sade Smith MD, CAQSM, RMSK

## 2020-01-09 ENCOUNTER — APPOINTMENT (OUTPATIENT)
Dept: CT IMAGING | Age: 25
End: 2020-01-09
Attending: EMERGENCY MEDICINE
Payer: MEDICAID

## 2020-01-09 ENCOUNTER — OFFICE VISIT (OUTPATIENT)
Dept: FAMILY MEDICINE CLINIC | Age: 25
End: 2020-01-09

## 2020-01-09 ENCOUNTER — APPOINTMENT (OUTPATIENT)
Dept: NUCLEAR MEDICINE | Age: 25
End: 2020-01-09
Attending: STUDENT IN AN ORGANIZED HEALTH CARE EDUCATION/TRAINING PROGRAM
Payer: MEDICAID

## 2020-01-09 ENCOUNTER — APPOINTMENT (OUTPATIENT)
Dept: GENERAL RADIOLOGY | Age: 25
End: 2020-01-09
Attending: EMERGENCY MEDICINE
Payer: MEDICAID

## 2020-01-09 ENCOUNTER — HOSPITAL ENCOUNTER (OUTPATIENT)
Age: 25
Setting detail: OBSERVATION
Discharge: HOME | End: 2020-01-10
Attending: EMERGENCY MEDICINE | Admitting: FAMILY MEDICINE
Payer: MEDICAID

## 2020-01-09 VITALS
TEMPERATURE: 98.4 F | BODY MASS INDEX: 26.49 KG/M2 | HEART RATE: 111 BPM | SYSTOLIC BLOOD PRESSURE: 121 MMHG | HEIGHT: 65 IN | DIASTOLIC BLOOD PRESSURE: 84 MMHG | WEIGHT: 159 LBS | RESPIRATION RATE: 18 BRPM | OXYGEN SATURATION: 99 %

## 2020-01-09 DIAGNOSIS — R10.11 RIGHT UPPER QUADRANT ABDOMINAL PAIN: Primary | ICD-10-CM

## 2020-01-09 DIAGNOSIS — R11.2 INTRACTABLE VOMITING WITH NAUSEA, UNSPECIFIED VOMITING TYPE: ICD-10-CM

## 2020-01-09 DIAGNOSIS — R10.11 ABDOMINAL PAIN, RIGHT UPPER QUADRANT: Primary | ICD-10-CM

## 2020-01-09 PROBLEM — R10.9 ABDOMINAL PAIN: Status: ACTIVE | Noted: 2020-01-09

## 2020-01-09 LAB
ALBUMIN SERPL-MCNC: 3.7 G/DL (ref 3.5–5)
ALBUMIN/GLOB SERPL: 1.2 {RATIO} (ref 1.1–2.2)
ALP SERPL-CCNC: 81 U/L (ref 45–117)
ALT SERPL-CCNC: 47 U/L (ref 12–78)
AMPHET UR QL SCN: NEGATIVE
ANION GAP SERPL CALC-SCNC: 7 MMOL/L (ref 5–15)
APPEARANCE UR: CLEAR
AST SERPL-CCNC: 22 U/L (ref 15–37)
BACTERIA URNS QL MICRO: ABNORMAL /HPF
BARBITURATES UR QL SCN: NEGATIVE
BASOPHILS # BLD: 0.1 K/UL (ref 0–0.1)
BASOPHILS NFR BLD: 1 % (ref 0–1)
BENZODIAZ UR QL: NEGATIVE
BILIRUB SERPL-MCNC: 0.4 MG/DL (ref 0.2–1)
BILIRUB UR QL: NEGATIVE
BUN SERPL-MCNC: 5 MG/DL (ref 6–20)
BUN/CREAT SERPL: 7 (ref 12–20)
CALCIUM SERPL-MCNC: 7.9 MG/DL (ref 8.5–10.1)
CANNABINOIDS UR QL SCN: NEGATIVE
CHLORIDE SERPL-SCNC: 107 MMOL/L (ref 97–108)
CO2 SERPL-SCNC: 26 MMOL/L (ref 21–32)
COCAINE UR QL SCN: NEGATIVE
COLOR UR: ABNORMAL
COMMENT, HOLDF: NORMAL
CREAT SERPL-MCNC: 0.74 MG/DL (ref 0.55–1.02)
DIFFERENTIAL METHOD BLD: NORMAL
DRUG SCRN COMMENT,DRGCM: NORMAL
EOSINOPHIL # BLD: 0.2 K/UL (ref 0–0.4)
EOSINOPHIL NFR BLD: 2 % (ref 0–7)
EPITH CASTS URNS QL MICRO: ABNORMAL /LPF
ERYTHROCYTE [DISTWIDTH] IN BLOOD BY AUTOMATED COUNT: 12.8 % (ref 11.5–14.5)
ETHANOL SERPL-MCNC: <10 MG/DL
GLOBULIN SER CALC-MCNC: 3.2 G/DL (ref 2–4)
GLUCOSE SERPL-MCNC: 107 MG/DL (ref 65–100)
GLUCOSE UR STRIP.AUTO-MCNC: NEGATIVE MG/DL
HCG UR QL: NEGATIVE
HCT VFR BLD AUTO: 41.3 % (ref 35–47)
HEMOCCULT STL QL: NEGATIVE
HGB BLD-MCNC: 13.4 G/DL (ref 11.5–16)
HGB UR QL STRIP: ABNORMAL
IMM GRANULOCYTES # BLD AUTO: 0 K/UL (ref 0–0.04)
IMM GRANULOCYTES NFR BLD AUTO: 0 % (ref 0–0.5)
KETONES UR QL STRIP.AUTO: NEGATIVE MG/DL
LEUKOCYTE ESTERASE UR QL STRIP.AUTO: ABNORMAL
LIPASE SERPL-CCNC: 134 U/L (ref 73–393)
LYMPHOCYTES # BLD: 2 K/UL (ref 0.8–3.5)
LYMPHOCYTES NFR BLD: 23 % (ref 12–49)
MAGNESIUM SERPL-MCNC: 2 MG/DL (ref 1.6–2.4)
MCH RBC QN AUTO: 29.2 PG (ref 26–34)
MCHC RBC AUTO-ENTMCNC: 32.4 G/DL (ref 30–36.5)
MCV RBC AUTO: 90 FL (ref 80–99)
METHADONE UR QL: NEGATIVE
MONOCYTES # BLD: 0.6 K/UL (ref 0–1)
MONOCYTES NFR BLD: 7 % (ref 5–13)
NEUTS SEG # BLD: 5.6 K/UL (ref 1.8–8)
NEUTS SEG NFR BLD: 67 % (ref 32–75)
NITRITE UR QL STRIP.AUTO: NEGATIVE
NRBC # BLD: 0 K/UL (ref 0–0.01)
NRBC BLD-RTO: 0 PER 100 WBC
OPIATES UR QL: NEGATIVE
PCP UR QL: NEGATIVE
PH UR STRIP: 6 [PH] (ref 5–8)
PHOSPHATE SERPL-MCNC: 3.2 MG/DL (ref 2.6–4.7)
PLATELET # BLD AUTO: 273 K/UL (ref 150–400)
PMV BLD AUTO: 9.8 FL (ref 8.9–12.9)
POTASSIUM SERPL-SCNC: 3.4 MMOL/L (ref 3.5–5.1)
PROT SERPL-MCNC: 6.9 G/DL (ref 6.4–8.2)
PROT UR STRIP-MCNC: NEGATIVE MG/DL
RBC # BLD AUTO: 4.59 M/UL (ref 3.8–5.2)
RBC #/AREA URNS HPF: ABNORMAL /HPF (ref 0–5)
SAMPLES BEING HELD,HOLD: NORMAL
SODIUM SERPL-SCNC: 140 MMOL/L (ref 136–145)
SP GR UR REFRACTOMETRY: <1.005 (ref 1–1.03)
TSH SERPL DL<=0.05 MIU/L-ACNC: 1.46 UIU/ML (ref 0.36–3.74)
UROBILINOGEN UR QL STRIP.AUTO: 0.2 EU/DL (ref 0.2–1)
WBC # BLD AUTO: 8.6 K/UL (ref 3.6–11)
WBC URNS QL MICRO: ABNORMAL /HPF (ref 0–4)

## 2020-01-09 PROCEDURE — 96361 HYDRATE IV INFUSION ADD-ON: CPT

## 2020-01-09 PROCEDURE — 65660000000 HC RM CCU STEPDOWN

## 2020-01-09 PROCEDURE — 99218 HC RM OBSERVATION: CPT

## 2020-01-09 PROCEDURE — 74011250636 HC RX REV CODE- 250/636: Performed by: STUDENT IN AN ORGANIZED HEALTH CARE EDUCATION/TRAINING PROGRAM

## 2020-01-09 PROCEDURE — 71045 X-RAY EXAM CHEST 1 VIEW: CPT

## 2020-01-09 PROCEDURE — 80053 COMPREHEN METABOLIC PANEL: CPT

## 2020-01-09 PROCEDURE — 99285 EMERGENCY DEPT VISIT HI MDM: CPT

## 2020-01-09 PROCEDURE — 96375 TX/PRO/DX INJ NEW DRUG ADDON: CPT

## 2020-01-09 PROCEDURE — 80307 DRUG TEST PRSMV CHEM ANLYZR: CPT

## 2020-01-09 PROCEDURE — 94760 N-INVAS EAR/PLS OXIMETRY 1: CPT

## 2020-01-09 PROCEDURE — 65270000029 HC RM PRIVATE

## 2020-01-09 PROCEDURE — 87086 URINE CULTURE/COLONY COUNT: CPT

## 2020-01-09 PROCEDURE — 85025 COMPLETE CBC W/AUTO DIFF WBC: CPT

## 2020-01-09 PROCEDURE — 84443 ASSAY THYROID STIM HORMONE: CPT

## 2020-01-09 PROCEDURE — 36415 COLL VENOUS BLD VENIPUNCTURE: CPT

## 2020-01-09 PROCEDURE — 96374 THER/PROPH/DIAG INJ IV PUSH: CPT

## 2020-01-09 PROCEDURE — 84100 ASSAY OF PHOSPHORUS: CPT

## 2020-01-09 PROCEDURE — 74011000250 HC RX REV CODE- 250: Performed by: STUDENT IN AN ORGANIZED HEALTH CARE EDUCATION/TRAINING PROGRAM

## 2020-01-09 PROCEDURE — 93005 ELECTROCARDIOGRAM TRACING: CPT

## 2020-01-09 PROCEDURE — 87491 CHLMYD TRACH DNA AMP PROBE: CPT

## 2020-01-09 PROCEDURE — A9537 TC99M MEBROFENIN: HCPCS

## 2020-01-09 PROCEDURE — 81001 URINALYSIS AUTO W/SCOPE: CPT

## 2020-01-09 PROCEDURE — 74011250636 HC RX REV CODE- 250/636: Performed by: EMERGENCY MEDICINE

## 2020-01-09 PROCEDURE — 74011250637 HC RX REV CODE- 250/637: Performed by: STUDENT IN AN ORGANIZED HEALTH CARE EDUCATION/TRAINING PROGRAM

## 2020-01-09 PROCEDURE — 83690 ASSAY OF LIPASE: CPT

## 2020-01-09 PROCEDURE — 81025 URINE PREGNANCY TEST: CPT

## 2020-01-09 PROCEDURE — 96376 TX/PRO/DX INJ SAME DRUG ADON: CPT

## 2020-01-09 PROCEDURE — 74177 CT ABD & PELVIS W/CONTRAST: CPT

## 2020-01-09 PROCEDURE — 83735 ASSAY OF MAGNESIUM: CPT

## 2020-01-09 PROCEDURE — 82272 OCCULT BLD FECES 1-3 TESTS: CPT

## 2020-01-09 PROCEDURE — 74011636320 HC RX REV CODE- 636/320: Performed by: RADIOLOGY

## 2020-01-09 RX ORDER — SODIUM CHLORIDE 0.9 % (FLUSH) 0.9 %
5-40 SYRINGE (ML) INJECTION AS NEEDED
Status: DISCONTINUED | OUTPATIENT
Start: 2020-01-09 | End: 2020-01-10 | Stop reason: HOSPADM

## 2020-01-09 RX ORDER — ENOXAPARIN SODIUM 100 MG/ML
40 INJECTION SUBCUTANEOUS EVERY 24 HOURS
Status: DISCONTINUED | OUTPATIENT
Start: 2020-01-09 | End: 2020-01-09

## 2020-01-09 RX ORDER — SODIUM CHLORIDE 9 MG/ML
100 INJECTION, SOLUTION INTRAVENOUS CONTINUOUS
Status: DISCONTINUED | OUTPATIENT
Start: 2020-01-09 | End: 2020-01-10 | Stop reason: HOSPADM

## 2020-01-09 RX ORDER — ONDANSETRON 2 MG/ML
4 INJECTION INTRAMUSCULAR; INTRAVENOUS
Status: DISCONTINUED | OUTPATIENT
Start: 2020-01-09 | End: 2020-01-10

## 2020-01-09 RX ORDER — KETOROLAC TROMETHAMINE 30 MG/ML
30 INJECTION, SOLUTION INTRAMUSCULAR; INTRAVENOUS
Status: COMPLETED | OUTPATIENT
Start: 2020-01-09 | End: 2020-01-09

## 2020-01-09 RX ORDER — ONDANSETRON 4 MG/1
4 TABLET, ORALLY DISINTEGRATING ORAL
COMMUNITY
End: 2020-01-10

## 2020-01-09 RX ORDER — ONDANSETRON 2 MG/ML
8 INJECTION INTRAMUSCULAR; INTRAVENOUS
Status: COMPLETED | OUTPATIENT
Start: 2020-01-09 | End: 2020-01-09

## 2020-01-09 RX ORDER — ACETAMINOPHEN 325 MG/1
650 TABLET ORAL
Status: COMPLETED | OUTPATIENT
Start: 2020-01-09 | End: 2020-01-09

## 2020-01-09 RX ORDER — VENLAFAXINE HYDROCHLORIDE 37.5 MG/1
75 CAPSULE, EXTENDED RELEASE ORAL
Status: DISCONTINUED | OUTPATIENT
Start: 2020-01-10 | End: 2020-01-10 | Stop reason: HOSPADM

## 2020-01-09 RX ORDER — SODIUM CHLORIDE 0.9 % (FLUSH) 0.9 %
5-40 SYRINGE (ML) INJECTION EVERY 8 HOURS
Status: DISCONTINUED | OUTPATIENT
Start: 2020-01-09 | End: 2020-01-10 | Stop reason: HOSPADM

## 2020-01-09 RX ORDER — PANTOPRAZOLE SODIUM 20 MG/1
20 TABLET, DELAYED RELEASE ORAL
Status: DISCONTINUED | OUTPATIENT
Start: 2020-01-10 | End: 2020-01-09

## 2020-01-09 RX ORDER — ACETAMINOPHEN 325 MG/1
650 TABLET ORAL
Status: DISCONTINUED | OUTPATIENT
Start: 2020-01-09 | End: 2020-01-09

## 2020-01-09 RX ORDER — TEMAZEPAM 15 MG/1
15 CAPSULE ORAL ONCE
Status: COMPLETED | OUTPATIENT
Start: 2020-01-09 | End: 2020-01-09

## 2020-01-09 RX ORDER — KETOROLAC TROMETHAMINE 30 MG/ML
30 INJECTION, SOLUTION INTRAMUSCULAR; INTRAVENOUS
Status: DISCONTINUED | OUTPATIENT
Start: 2020-01-09 | End: 2020-01-10

## 2020-01-09 RX ADMIN — TEMAZEPAM 15 MG: 15 CAPSULE ORAL at 21:24

## 2020-01-09 RX ADMIN — WATER 1 G: 1 INJECTION INTRAMUSCULAR; INTRAVENOUS; SUBCUTANEOUS at 18:03

## 2020-01-09 RX ADMIN — KETOROLAC TROMETHAMINE 30 MG: 30 INJECTION INTRAMUSCULAR; INTRAVENOUS at 12:41

## 2020-01-09 RX ADMIN — ONDANSETRON 4 MG: 2 INJECTION INTRAMUSCULAR; INTRAVENOUS at 19:06

## 2020-01-09 RX ADMIN — SODIUM CHLORIDE 1000 ML: 900 INJECTION, SOLUTION INTRAVENOUS at 12:15

## 2020-01-09 RX ADMIN — KETOROLAC TROMETHAMINE 30 MG: 30 INJECTION, SOLUTION INTRAMUSCULAR at 19:06

## 2020-01-09 RX ADMIN — SODIUM CHLORIDE 100 ML/HR: 900 INJECTION, SOLUTION INTRAVENOUS at 19:07

## 2020-01-09 RX ADMIN — ACETAMINOPHEN 650 MG: 325 TABLET ORAL at 21:24

## 2020-01-09 RX ADMIN — ONDANSETRON 8 MG: 2 INJECTION INTRAMUSCULAR; INTRAVENOUS at 12:16

## 2020-01-09 RX ADMIN — IOPAMIDOL 100 ML: 755 INJECTION, SOLUTION INTRAVENOUS at 13:31
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
## 2020-01-09 NOTE — TELEPHONE ENCOUNTER
Received message from patient stating she has been extremely ill with nausea and vomiting today. I recently saw pt in clinic for theses symptoms yesterday. Abd US did not show any gallbladder thickening or stones. Labs only with slight ALT inc but otherwise normal. Discussed these results with patient. Advised pt to continue protonix and return stool hpylori test to clinic. Advised to go to the ED for evaluation if patient continues not take PO hydration and has vomiting or becomes febrile as our clinic is closed at this time. Pt to follow up in clinic as soon as possible. Pt verbalized understanding.

## 2020-01-09 NOTE — PROGRESS NOTES
Admission Medication Reconciliation:     Information obtained from:   Patient   RxQuery data available¹:  YES    Comments/Recommendations:   Updated PTA medication list  Verified preferred pharmacy  Reviewed patient's allergies     ¹RxQuery pharmacy benefit data reflects medications filled and processed through the patient's insurance, however   this data does NOT capture whether the medication was picked up or is currently being taken by the patient. Prior to Admission Medications   Prescriptions Last Dose Informant Taking?   hydrOXYzine HCl (ATARAX) 50 mg tablet 1/8/2020 at Unknown time Self Yes   Sig: TAKE 1 TABLET BY MOUTH THREE TIMES A DAY AS NEEDED FOR ANXIETY   ondansetron (ZOFRAN ODT) 4 mg disintegrating tablet 1/8/2020 at Unknown time  Yes   Sig: Take 4 mg by mouth every eight (8) hours as needed for Nausea or Nausea or Vomiting. pantoprazole (PROTONIX) 20 mg tablet 1/9/2020 at Unknown time Self Yes   Sig: Take 1 Tab by mouth Daily (before breakfast). temazepam (RESTORIL) 15 mg capsule 1/8/2020 at Unknown time Self Yes   Sig: TAKE 1 CAPSULE BY MOUTH AT NIGHT   venlafaxine-SR (EFFEXOR-XR) 75 mg capsule 1/9/2020 at Unknown time Self Yes   Sig: TAKE 1 CAPSULE BY MOUTH EVERY DAY      Facility-Administered Medications: None         Please contact the main inpatient pharmacy with any questions or concerns at [phone] and we will direct you to the clinical pharmacist covering this patient's care while in-house.    Josh Demarco, ElkinD, BCPS

## 2020-01-09 NOTE — ED NOTES
11:44 AM  I have evaluated the patient as the Provider in Triage. I have reviewed Her vital signs and the triage nurse assessment. I have talked with the patient and any available family and advised that I am the provider in triage and have ordered the appropriate study to initiate their work up based on the clinical presentation during my assessment. I have advised that the patient will be accommodated in the Main ED as soon as possible. I have also requested to contact the triage nurse or myself immediately if the patient experiences any changes in their condition during this brief waiting period. Sent by [facility] practice for admission, further workup regarding RUQ pain, Positive Shelton's sign and GI consult.    Sabra Taylor NP

## 2020-01-09 NOTE — TELEPHONE ENCOUNTER
From: Mali Skinner  To: [facility]  Sent: 1/8/2020 1:43 PM EST  Subject: Non-Urgent Medical Question    Hello  I'm attempting to contact Khang Weiner been feeling extremely ill since last night. I haven't been able to eat since 11:00am yesterday. I feel extremely nauseous and have been vomiting. I also feel very tired and weak. My stomach is hurting as well on the upper right side. If you could get back to me I'd appreciate it!   Thank you   Steve Live

## 2020-01-09 NOTE — H&P
[facility] PROGRAM   Admission H&P    Date of admission: 1/9/2020    Patient name: Simón Medley  MRN: [id number]  YOB: 1995  Age: 21 y.o. Primary care provider:  Waqas Marshall DO     Source of Information: patient, medical records    Chief complaint:  Abdominal pain    History of Present Illness  Simón Medley is a 21 y.o. female w/ PMH PTSD, anxiety, ETOH Abuse and Chlamydia who presents to the ER complaining of worsening abdominal pain x2 days, as well as nausea and vomiting (4-8x per day) for 1.5months. Pain is 8/10, located at the RUQ, intermittent, non-radiating, not alleviated by anything in particular. Pt has not been able to keep down her normal foods and has been only able to tolerate soups and bread. She has been fatigued as a result of her constant vomiting. She also endorses some small blood in her sputum after vomiting a few times as well as \"blood w/ mucus\" in her H.pylori stool sample given at [facility] 1/8/20. Pt was at [facility] on 1/7 and today for same complaints and was subsequently sent to [facility] for further evaluation w/ GI consultation. Pt denies new foods/traveling/fever/chills/CP/palp/SOB/dysuria. Of note, pt had a RUQ US the day prior at clinic which was neg except for a hepatic hemangioma (also seen in a CT scan in 12/19 at [facility] ED). Per pt, the CT scan at [address] [place] [id number] also showed an ovarian cyst, for which her OBGYN recommended f/u imaging in 5 months and f/u w/ her PCP.      Also, she denies any current substance use however, she has a Hx of Alcohol Abuse (dgx in 2016) and endorses cessation at that time after going to a treatment facility.       ED:  Vitals: bp 121/84, S328-840, RR18, spO2 99% RA  Lab: CBC wnl, CMP (K. 3.4), Lipase wnl, Preg test neg, UA (2+ bact, trace LE, neg nitrites)  Imaging: CXR neg, CT Abd-Pelv neg   Tx: 1L IVF, IV Zofran 8mg, Toradol 30mg x1     Home Medications   Prior to Admission medications    Medication Sig Start Date End Date Taking? Authorizing Provider   ondansetron (ZOFRAN ODT) 4 mg disintegrating tablet Take 4 mg by mouth every eight (8) hours as needed for Nausea or Nausea or Vomiting. Yes Provider, Historical   hydrOXYzine HCl (ATARAX) 50 mg tablet TAKE 1 TABLET BY MOUTH THREE TIMES A DAY AS NEEDED FOR ANXIETY 12/26/19  Yes Provider, Historical   temazepam (RESTORIL) 15 mg capsule TAKE 1 CAPSULE BY MOUTH AT NIGHT 1/3/20  Yes Provider, Historical   venlafaxine-SR (EFFEXOR-XR) 75 mg capsule TAKE 1 CAPSULE BY MOUTH EVERY DAY 12/18/19  Yes Provider, Historical   pantoprazole (PROTONIX) 20 mg tablet Take 1 Tab by mouth Daily (before breakfast).  1/7/20  Yes Asha Heath,        Allergies   No Known Allergies      Past Medical History:   Diagnosis Date    Atrial fibrillation ([facility] 11/30/2017    [facility]line personality disorder ([facility]     vs. traits thereof    Chlamydia infection affecting pregnancy 12/12/2017    Chronic tonsillitis     s/p tonsillectomy 2011    Conduct disorder     History of inadequate prenatal care 3/13/2014    Migraine     Mood disorder ([facility] 5/19/2015    Oppositional defiant disorder     Substance abuse (HCC)     mj, alcohol, heroin       Past Surgical History:   Procedure Laterality Date    HX OTHER SURGICAL      HX TONSILLECTOMY  2011       Family History   Problem Relation Age of Onset    Anxiety Mother     Depression Mother     Alcohol abuse Mother         still using    Anxiety Father     Lupus Maternal Grandmother     Depression Sister     Depression Sister        Social History   Patient resides    Independently    X  With family care      Assisted living      SNF      Ambulates  X  Independently      With cane       Assisted walker           Alcohol history   X  None (Alchohol Abuse in past, last drink 2016)     Social     Chronic     Smoking history  X  None     Former smoker     Current smoker     Social History Tobacco Use   Smoking Status Former Smoker    Packs/day: 0.25    Last attempt to quit: 11/29/2016    Years since quitting: 3.1   Smokeless Tobacco Never Used   Tobacco Comment    PATIENT VAPES           Drug history    None   X  Former drug user     Current drug user     Sexual history    Sexually active    X  Not sexually active     Code status  X  Full code     DNR/DNI     Partial    Code status discussed with the patient/caregivers. Review of Systems  Review of Systems   Constitutional: Positive for malaise/fatigue. Negative for chills, fever and weight loss. Respiratory: Negative for cough, hemoptysis and shortness of breath. Cardiovascular: Negative for chest pain and palpitations. Gastrointestinal: Positive for abdominal pain, nausea and vomiting. Negative for blood in stool, constipation, diarrhea and heartburn. Genitourinary: Negative for dysuria, frequency and urgency. Neurological: Negative for headaches. Psychiatric/Behavioral: Negative for substance abuse and suicidal ideas. Physical Exam  Visit Vitals  BP (!) 124/96   Pulse 100   Temp 98.3 °F (36.8 °C)   Resp 17   Ht 5' 5\" (1.651 m)   Wt 159 lb (72.1 kg)   LMP 01/01/2020   SpO2 100%   BMI 26.46 kg/m²        General: No acute distress. Alert. Cooperative. Head: Normocephalic. Atraumatic. Eyes:  Conjunctiva pink. Sclera white. PERRL. Ears:  Ear canals patent. TM non-erythematous. Nose:  Septum midline. Mucosa pink. No drainage. Throat: Mucosa pink. Moist mucous membranes. No tonsillar exudates or erythema. Palate movement equal bilaterally. Neck: Supple. Normal ROM. No stiffness. Respiratory: CTAB. No w/r/r/c.   Cardiovascular: RRR. Normal S1,S2. No m/r/g. Pulses 2+ throughout. GI: + Shelton's sign +tender in RUQ +nl BS. No rebound tenderness/rigidity/guarding. Nondistended. Extremities: No edema. No palpable cord. No tenderness. Musculoskeletal: Full ROM in all extremities.    Neuro: CN II-XII grossly intact. Strength 5/5 in all extremities. Sensation intact in all extremities. DTRs 2+ throughout. Skin: Clear. No rashes. No ulcers. : Deferred   Rectal: Deferred       Laboratory Data  Recent Results (from the past 24 hour(s))   CBC WITH AUTOMATED DIFF    Collection Time: 01/09/20 11:47 AM   Result Value Ref Range    WBC 8.6 3.6 - 11.0 K/uL    RBC 4.59 3.80 - 5.20 M/uL    HGB 13.4 11.5 - 16.0 g/dL    HCT 41.3 35.0 - 47.0 %    MCV 90.0 80.0 - 99.0 FL    MCH 29.2 26.0 - 34.0 PG    MCHC 32.4 30.0 - 36.5 g/dL    RDW 12.8 11.5 - 14.5 %    PLATELET 268 645 - 321 K/uL    MPV 9.8 8.9 - 12.9 FL    NRBC 0.0 0  WBC    ABSOLUTE NRBC 0.00 0.00 - 0.01 K/uL    NEUTROPHILS 67 32 - 75 %    LYMPHOCYTES 23 12 - 49 %    MONOCYTES 7 5 - 13 %    EOSINOPHILS 2 0 - 7 %    BASOPHILS 1 0 - 1 %    IMMATURE GRANULOCYTES 0 0.0 - 0.5 %    ABS. NEUTROPHILS 5.6 1.8 - 8.0 K/UL    ABS. LYMPHOCYTES 2.0 0.8 - 3.5 K/UL    ABS. MONOCYTES 0.6 0.0 - 1.0 K/UL    ABS. EOSINOPHILS 0.2 0.0 - 0.4 K/UL    ABS. BASOPHILS 0.1 0.0 - 0.1 K/UL    ABS. IMM. GRANS. 0.0 0.00 - 0.04 K/UL    DF AUTOMATED     METABOLIC PANEL, COMPREHENSIVE    Collection Time: 01/09/20 11:47 AM   Result Value Ref Range    Sodium 140 136 - 145 mmol/L    Potassium 3.4 (L) 3.5 - 5.1 mmol/L    Chloride 107 97 - 108 mmol/L    CO2 26 21 - 32 mmol/L    Anion gap 7 5 - 15 mmol/L    Glucose 107 (H) 65 - 100 mg/dL    BUN 5 (L) 6 - 20 MG/DL    Creatinine 0.74 0.55 - 1.02 MG/DL    BUN/Creatinine ratio 7 (L) 12 - 20      GFR est AA >60 >60 ml/min/1.73m2    GFR est non-AA >60 >60 ml/min/1.73m2    Calcium 7.9 (L) 8.5 - 10.1 MG/DL    Bilirubin, total 0.4 0.2 - 1.0 MG/DL    ALT (SGPT) 47 12 - 78 U/L    AST (SGOT) 22 15 - 37 U/L    Alk.  phosphatase 81 45 - 117 U/L    Protein, total 6.9 6.4 - 8.2 g/dL    Albumin 3.7 3.5 - 5.0 g/dL    Globulin 3.2 2.0 - 4.0 g/dL    A-G Ratio 1.2 1.1 - 2.2     LIPASE    Collection Time: 01/09/20 11:47 AM   Result Value Ref Range    Lipase 134 73 - 393 U/L URINALYSIS W/ RFLX MICROSCOPIC    Collection Time: 01/09/20 12:12 PM   Result Value Ref Range    Color YELLOW/STRAW      Appearance CLEAR CLEAR      Specific gravity <1.005 1.003 - 1.030    pH (UA) 6.0 5.0 - 8.0      Protein NEGATIVE  NEG mg/dL    Glucose NEGATIVE  NEG mg/dL    Ketone NEGATIVE  NEG mg/dL    Bilirubin NEGATIVE  NEG      Blood TRACE (A) NEG      Urobilinogen 0.2 0.2 - 1.0 EU/dL    Nitrites NEGATIVE  NEG      Leukocyte Esterase TRACE (A) NEG      WBC 5-10 0 - 4 /hpf    RBC 0-5 0 - 5 /hpf    Epithelial cells MODERATE (A) FEW /lpf    Bacteria 2+ (A) NEG /hpf   HCG URINE, QL. - POC    Collection Time: 01/09/20 12:15 PM   Result Value Ref Range    Pregnancy test,urine (POC) NEGATIVE  NEG     DRUG SCREEN, URINE    Collection Time: 01/09/20  3:05 PM   Result Value Ref Range    AMPHETAMINES NEGATIVE  NEG      BARBITURATES NEGATIVE  NEG      BENZODIAZEPINES NEGATIVE  NEG      COCAINE NEGATIVE  NEG      METHADONE NEGATIVE  NEG      OPIATES NEGATIVE  NEG      PCP(PHENCYCLIDINE) NEGATIVE  NEG      THC (TH-CANNABINOL) NEGATIVE  NEG      Drug screen comment (NOTE)        Imaging    CXR: IMPRESSION:  No acute process    CT Abd-Pelvis:   LUNG BASES: Clear. INCIDENTALLY IMAGED HEART AND MEDIASTINUM: Unremarkable. LIVER: 12 mm hypoattenuating lesion in right hepatic lobe corresponding with  ultrasound finding, probable hemangioma. Liver otherwise normal. No biliary duct  dilation demonstrated. GALLBLADDER: Unremarkable. SPLEEN: No mass. PANCREAS: No mass or ductal dilatation. ADRENALS: Unremarkable. KIDNEYS: No mass, calculus, or hydronephrosis. STOMACH: Unremarkable. SMALL BOWEL: No dilatation or wall thickening. COLON: No dilatation or wall thickening. APPENDIX: Unremarkable. PERITONEUM: No ascites or pneumoperitoneum. RETROPERITONEUM: No lymphadenopathy or aortic aneurysm. REPRODUCTIVE ORGANS: Unremarkable. URINARY BLADDER: No mass or calculus.   BONES: Appear normal.  ADDITIONAL COMMENTS: N/A  MPRESSION:  No acute process      EKG:  pending    Assessment and Plan   Ming Buck is a 21 y.o. female w/ PMH PTSD, anxiety, ETOH Abuse and Chlamydia who is admitted for worsening abdominal pain. Abdominal Pain: POA RUQ abdom pain x1.5m. +N/V (4-8x per day). RUQ US 1/7/20 only 1.8cm liver mass. Diff include: gastritis (2/2 NSAIDs vs H. Pylori) vs. GERD vs GB/Biliary tree pathology vs PID (hx Chlamydia). Neg preg test.   - Admit to remote telemetry   - VS per unit protocol  - ns IVF @ 100ml/H  - IV Toradol q6H prn , IV Zofran Q4H prn   - IV Protonix 400mg   - Daily CBC, CMP  - F/u FOBT, HIDA, GC/CT, Mag & Phos, TSH   - F/u clinic H. Pylori stool sample   - NPO after midnight pending procedure  - GI c/s: will appreciate recs     R/o GI bleed: POA Hgb 13.4 (nl). No hx of anemia. Endorsed mild blood at times during vomiting episodes. Likely 2/2 to retching given N/V x1.5m. Pt endorsed \"blood w/ mucus\" in stool sample. No Hx of GI scopes. - F/u FOBT  - IV Protonix 400mg   - SCDs for now until FOBT result  - NPO after midnight pending procedure  - GI c/s: will appreciate recs     Asymptomatic UTI: No dysuria. UA (2+ bact, LE, no nitrites). - F/u Ucx   - IV CTX 1g day 1/3      Tachycardia: POA . Likely 2/2 to N/V. Hx of SVTs per pt (prior metoprolol that was d/c'd about 1 yr ago due to pregnancy)  - F/u EKG     Hepatic Lesion (R Lobe): Incidental finding on CT at [facility] ED. RUQ US 1/7/20 (likely hemangioma, 1.8cm). - Consider further eval w/ MRI or f/u US     Hx PTSD & Anxiety: Pt on home Atarax 50mg TID PRN & Velafaxine 75mg daily   - Held home Temazepam and Atarax    - Continued home Velafaxine 75mg daily     Hx of Alcohol Abuse: UDS neg. Per , receiving Temazepam 15mg qHs (last Rx filled 1/3)   - F/u Alcohol level  - Held home Temazepam     FEN/GI - NPO at midnight. Ns 100 mL/hr.   Activity - as tolerated  DVT prophylaxis - SCDs    GI prophylaxis -  IV Protonix 40mg   Disposition - Plan to d/c to home.      CODE STATUS:  FULL  KIN: Verlan Cabot (father): [phone]       Patient discussed with Dr. Pinkie Cranker, [address] Problems  Date Reviewed: 11/30/2017          Codes Class Noted POA    Abdominal pain ICD-10-CM: R10.9  ICD-9-CM: 789.00  1/9/2020 Unknown

## 2020-01-09 NOTE — ED NOTES
Elva Nunez TRANSFER - OUT REPORT:    Verbal report given to Clover (name) on Darryl Rodriguez  being transferred to [facility] (unit) for routine progression of care       Report consisted of patients Situation, Background, Assessment and   Recommendations(SBAR). Information from the following report(s) SBAR, Kardex, ED Summary, Procedure Summary, Intake/Output and MAR was reviewed with the receiving nurse. Lines:   Peripheral IV 01/09/20 Left Antecubital (Active)   Site Assessment Clean, dry, & intact 1/9/2020 11:50 AM   Phlebitis Assessment 0 1/9/2020 11:50 AM   Infiltration Assessment 0 1/9/2020 11:50 AM   Dressing Status Clean, dry, & intact 1/9/2020 11:50 AM   Dressing Type Tape;Transparent 1/9/2020 11:50 AM   Hub Color/Line Status Pink;Flushed;Patent 1/9/2020 11:50 AM   Action Taken Blood drawn 1/9/2020 11:50 AM        Opportunity for questions and clarification was provided.       Patient transported with:   [facility]

## 2020-01-09 NOTE — PROGRESS NOTES
I saw and evaluated the patient, performing the key elements of the service. I discussed the findings, assessment and plan with the resident and agree with the resident's findings and plan as documented in the resident's note. 22 yo female is seen for persistent RUQ abdominal pain associated with nausea and emesis not responding to antiemetics. Was seen at [facility] ER couple of times for the last month, no records are avaliable but she stated her UPT were negative, CT abdomen was negative as well. Unable to keep the fluids. Was seen in the clinic yesterday, sent to get US RUQ which showed hepatic hemangioma and no cholecystitis or cholelithiasis, was recommended to get MRI . Since yesterday the pain worsened. On exam today, VS remarkable for mild tachycardia ( which may be chronic) otherwise stable, +Shelton sign, no guarding or rebound tenderness. Will send to ER for the labs and imaging, may benefit from admission to complete the work up.

## 2020-01-09 NOTE — PROGRESS NOTES
1. Have you been to the ER, urgent care clinic since your last visit? Hospitalized since your last visit? No    2. Have you seen or consulted any other health care providers outside of the [address] [place] [address] since your last visit? Include any pap smears or colon screening. No    Chief Complaint   Patient presents with    Abdominal Pain     RUQ    Nausea    Vomiting     Patient states has been sick for over a month. States spoke with doctor last night, was advised to go to hospital but patient wanted to come here first.  Patient states unable to keep food down, and is \"brining up bile\". Patient states pain gets worse when she breathes deep. Blood pressure 121/84, pulse (!) 111, temperature 98.4 °F (36.9 °C), temperature source Oral, resp. rate 18, height 5' 5\" (1.651 m), weight 159 lb (72.1 kg), last menstrual period 12/31/2019, SpO2 99 %. - Simethicone prn   - Famotidine PRN   - Pantoprazole

## 2020-01-09 NOTE — ED TRIAGE NOTES
Pt referred here for RUQ pain for approx 1 month. Pain has worsened. \" When I take a deep breath it feels like I am being stabbed between my ribs\". Reports nausea with vomiting. Seen by PCP and had US of RUQ yesterday and had a hemangioma.

## 2020-01-09 NOTE — PROGRESS NOTES
1/9/2020  3:48 PM  Case management note    Reason for Admission:   Abdominal pain    Patient is independent and lives with fiance in 2 story home with 4 steps to enter and 14 upstairs. Patient has had RUQ pain for about 6 weeks. History of afib and substance abuse  [facility] [address]                   RRAT Score:         4            Plan for utilizing home health:      Patient is independent and will most likely not qualify for home health servcies                    Current Advanced Directive/Advance Care Plan:  Does not have any advance care planning                         Transition of Care Plan:             1.home with family assistance  2. PCP follow up  3. GI follow up  4.  CM to follow until discharge    Care Management Interventions  Mode of Transport at Discharge: Self  Transition of Care Consult (CM Consult): Discharge Planning  Current Support Network: Lives with Spouse  Confirm Follow Up Transport: Family  The Plan for Transition of Care is Related to the Following Treatment Goals : abdominal pain  Discharge Location  Discharge Placement: Home with family assistance  Loretta Bates
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
## 2020-01-09 NOTE — TELEPHONE ENCOUNTER
Discussed lab results with patient. US without abnormal findings except hepatic hemangioma that was previously present as well.

## 2020-01-10 VITALS
DIASTOLIC BLOOD PRESSURE: 60 MMHG | RESPIRATION RATE: 18 BRPM | WEIGHT: 159 LBS | BODY MASS INDEX: 26.49 KG/M2 | OXYGEN SATURATION: 98 % | HEIGHT: 65 IN | HEART RATE: 70 BPM | TEMPERATURE: 98.1 F | SYSTOLIC BLOOD PRESSURE: 95 MMHG

## 2020-01-10 LAB
ALBUMIN SERPL-MCNC: 2.9 G/DL (ref 3.5–5)
ALBUMIN/GLOB SERPL: 1.1 {RATIO} (ref 1.1–2.2)
ALP SERPL-CCNC: 65 U/L (ref 45–117)
ALT SERPL-CCNC: 36 U/L (ref 12–78)
ANION GAP SERPL CALC-SCNC: 7 MMOL/L (ref 5–15)
AST SERPL-CCNC: 14 U/L (ref 15–37)
ATRIAL RATE: 74 BPM
BACTERIA SPEC CULT: NORMAL
BASOPHILS # BLD: 0.1 K/UL (ref 0–0.1)
BASOPHILS NFR BLD: 2 % (ref 0–1)
BILIRUB SERPL-MCNC: 0.2 MG/DL (ref 0.2–1)
BUN SERPL-MCNC: 5 MG/DL (ref 6–20)
BUN/CREAT SERPL: 7 (ref 12–20)
C TRACH DNA SPEC QL NAA+PROBE: NEGATIVE
CALCIUM SERPL-MCNC: 7.4 MG/DL (ref 8.5–10.1)
CALCULATED P AXIS, ECG09: 57 DEGREES
CALCULATED R AXIS, ECG10: 52 DEGREES
CALCULATED T AXIS, ECG11: 27 DEGREES
CC UR VC: NORMAL
CHLORIDE SERPL-SCNC: 111 MMOL/L (ref 97–108)
CO2 SERPL-SCNC: 25 MMOL/L (ref 21–32)
CREAT SERPL-MCNC: 0.71 MG/DL (ref 0.55–1.02)
DIAGNOSIS, 93000: NORMAL
DIFFERENTIAL METHOD BLD: ABNORMAL
EOSINOPHIL # BLD: 0.2 K/UL (ref 0–0.4)
EOSINOPHIL NFR BLD: 3 % (ref 0–7)
ERYTHROCYTE [DISTWIDTH] IN BLOOD BY AUTOMATED COUNT: 13.1 % (ref 11.5–14.5)
GLOBULIN SER CALC-MCNC: 2.6 G/DL (ref 2–4)
GLUCOSE SERPL-MCNC: 95 MG/DL (ref 65–100)
HCT VFR BLD AUTO: 37.9 % (ref 35–47)
HGB BLD-MCNC: 12.1 G/DL (ref 11.5–16)
IMM GRANULOCYTES # BLD AUTO: 0 K/UL (ref 0–0.04)
IMM GRANULOCYTES NFR BLD AUTO: 0 % (ref 0–0.5)
LYMPHOCYTES # BLD: 0.9 K/UL (ref 0.8–3.5)
LYMPHOCYTES NFR BLD: 16 % (ref 12–49)
MCH RBC QN AUTO: 28.9 PG (ref 26–34)
MCHC RBC AUTO-ENTMCNC: 31.9 G/DL (ref 30–36.5)
MCV RBC AUTO: 90.5 FL (ref 80–99)
MONOCYTES # BLD: 0.6 K/UL (ref 0–1)
MONOCYTES NFR BLD: 10 % (ref 5–13)
N GONORRHOEA DNA SPEC QL NAA+PROBE: NEGATIVE
NEUTS SEG # BLD: 4.2 K/UL (ref 1.8–8)
NEUTS SEG NFR BLD: 69 % (ref 32–75)
NRBC # BLD: 0 K/UL (ref 0–0.01)
NRBC BLD-RTO: 0 PER 100 WBC
P-R INTERVAL, ECG05: 146 MS
PLATELET # BLD AUTO: 252 K/UL (ref 150–400)
PMV BLD AUTO: 9.8 FL (ref 8.9–12.9)
POTASSIUM SERPL-SCNC: 3.8 MMOL/L (ref 3.5–5.1)
PROT SERPL-MCNC: 5.5 G/DL (ref 6.4–8.2)
Q-T INTERVAL, ECG07: 412 MS
QRS DURATION, ECG06: 86 MS
QTC CALCULATION (BEZET), ECG08: 457 MS
RBC # BLD AUTO: 4.19 M/UL (ref 3.8–5.2)
SAMPLE TYPE: NORMAL
SERVICE CMNT-IMP: NORMAL
SERVICE CMNT-IMP: NORMAL
SODIUM SERPL-SCNC: 143 MMOL/L (ref 136–145)
SPECIMEN SOURCE: NORMAL
VENTRICULAR RATE, ECG03: 74 BPM
WBC # BLD AUTO: 6 K/UL (ref 3.6–11)

## 2020-01-10 PROCEDURE — 74011000258 HC RX REV CODE- 258: Performed by: STUDENT IN AN ORGANIZED HEALTH CARE EDUCATION/TRAINING PROGRAM

## 2020-01-10 PROCEDURE — 96361 HYDRATE IV INFUSION ADD-ON: CPT

## 2020-01-10 PROCEDURE — 74011250636 HC RX REV CODE- 250/636: Performed by: STUDENT IN AN ORGANIZED HEALTH CARE EDUCATION/TRAINING PROGRAM

## 2020-01-10 PROCEDURE — 96365 THER/PROPH/DIAG IV INF INIT: CPT

## 2020-01-10 PROCEDURE — 96376 TX/PRO/DX INJ SAME DRUG ADON: CPT

## 2020-01-10 PROCEDURE — 74011250637 HC RX REV CODE- 250/637: Performed by: STUDENT IN AN ORGANIZED HEALTH CARE EDUCATION/TRAINING PROGRAM

## 2020-01-10 PROCEDURE — 36415 COLL VENOUS BLD VENIPUNCTURE: CPT

## 2020-01-10 PROCEDURE — 85025 COMPLETE CBC W/AUTO DIFF WBC: CPT

## 2020-01-10 PROCEDURE — 96375 TX/PRO/DX INJ NEW DRUG ADDON: CPT

## 2020-01-10 PROCEDURE — 80053 COMPREHEN METABOLIC PANEL: CPT

## 2020-01-10 PROCEDURE — 99218 HC RM OBSERVATION: CPT

## 2020-01-10 PROCEDURE — C9113 INJ PANTOPRAZOLE SODIUM, VIA: HCPCS | Performed by: STUDENT IN AN ORGANIZED HEALTH CARE EDUCATION/TRAINING PROGRAM

## 2020-01-10 RX ORDER — PROCHLORPERAZINE MALEATE 5 MG
5 TABLET ORAL
Qty: 30 TAB | Refills: 0 | Status: SHIPPED | OUTPATIENT
Start: 2020-01-10 | End: 2020-01-10 | Stop reason: SDUPTHER

## 2020-01-10 RX ORDER — ENOXAPARIN SODIUM 100 MG/ML
40 INJECTION SUBCUTANEOUS EVERY 24 HOURS
Status: DISCONTINUED | OUTPATIENT
Start: 2020-01-10 | End: 2020-01-10 | Stop reason: HOSPADM

## 2020-01-10 RX ORDER — CEPHALEXIN 500 MG/1
500 CAPSULE ORAL 2 TIMES DAILY
Qty: 2 CAP | Refills: 0 | Status: SHIPPED | OUTPATIENT
Start: 2020-01-10 | End: 2020-01-11

## 2020-01-10 RX ORDER — PROCHLORPERAZINE MALEATE 5 MG
5 TABLET ORAL
Qty: 30 TAB | Refills: 0 | Status: SHIPPED | OUTPATIENT
Start: 2020-01-10 | End: 2020-02-09

## 2020-01-10 RX ORDER — PANTOPRAZOLE SODIUM 40 MG/1
40 TABLET, DELAYED RELEASE ORAL DAILY
Qty: 30 TAB | Refills: 0 | Status: SHIPPED | OUTPATIENT
Start: 2020-01-10 | End: 2021-02-16

## 2020-01-10 RX ORDER — ACETAMINOPHEN 325 MG/1
650 TABLET ORAL
Status: DISCONTINUED | OUTPATIENT
Start: 2020-01-10 | End: 2020-01-10 | Stop reason: HOSPADM

## 2020-01-10 RX ORDER — PROCHLORPERAZINE EDISYLATE 5 MG/ML
5 INJECTION INTRAMUSCULAR; INTRAVENOUS
Status: DISCONTINUED | OUTPATIENT
Start: 2020-01-10 | End: 2020-01-10 | Stop reason: HOSPADM

## 2020-01-10 RX ADMIN — KETOROLAC TROMETHAMINE 30 MG: 30 INJECTION, SOLUTION INTRAMUSCULAR at 00:58

## 2020-01-10 RX ADMIN — PROCHLORPERAZINE EDISYLATE 5 MG: 5 INJECTION INTRAMUSCULAR; INTRAVENOUS at 10:40

## 2020-01-10 RX ADMIN — ACETAMINOPHEN 650 MG: 325 TABLET ORAL at 10:41

## 2020-01-10 RX ADMIN — SODIUM CHLORIDE 100 ML/HR: 900 INJECTION, SOLUTION INTRAVENOUS at 15:17

## 2020-01-10 RX ADMIN — PANTOPRAZOLE SODIUM 40 MG: 40 INJECTION, POWDER, FOR SOLUTION INTRAVENOUS at 08:05

## 2020-01-10 RX ADMIN — ONDANSETRON 4 MG: 2 INJECTION INTRAMUSCULAR; INTRAVENOUS at 00:58

## 2020-01-10 RX ADMIN — CEFTRIAXONE SODIUM 1 G: 1 INJECTION, POWDER, FOR SOLUTION INTRAVENOUS at 16:59

## 2020-01-10 RX ADMIN — VENLAFAXINE HYDROCHLORIDE 75 MG: 37.5 CAPSULE, EXTENDED RELEASE ORAL at 08:05

## 2020-01-10 RX ADMIN — SODIUM CHLORIDE 100 ML/HR: 900 INJECTION, SOLUTION INTRAVENOUS at 05:04

## 2020-01-10 RX ADMIN — ONDANSETRON 4 MG: 2 INJECTION INTRAMUSCULAR; INTRAVENOUS at 06:36

## 2020-01-10 RX ADMIN — Medication 10 ML: at 01:02

## 2020-01-10 NOTE — CONSULTS
Ankita Wakefield. Marivel Pretty MD  [phone] office  [phone] voicemail   Gastroenterology Consultation Note      Admit Date: 1/9/2020  Consult Date: 1/10/2020   I greatly appreciate your asking me to see Francie Clifton, thank you very much for the opportunity to participate in her care. Narrative Assessment and Plan   · Epigastric pain  · Vomiting    Etiology not clear but no evidence of hepatobiliary disease  No risk factors for PUD    She notes significant improvement since admission. Would give 2 month PPI trial  Check HP stool antigen and treat for helicobacter if positive  Advance diet as tolerated and if she can tolerate regular diet tomorrow without vomiting or increase in pain could discharge with advice to take PPI daily before breakfast 2 months and I'll arrange GI follow up. I'll have weekend team follow up with you. Subjective:     Chief Complaint: Abdominal Pain    History of Present Illness: Patient is a 21 y.o. female with no significant GI or medical problems admitted on 1/9/2020 with complaint of nausea and vomiting over the past several weeks. The onset was insidious, and symptoms have been intermittent since that time. The emesis occurs randomly and contents can be described as fluid or ingested food. Patient denies any associated dysphagia, reflux, endorses epigastric and RUQ abdominal pain, but no diarrhea, bleeding, or fevers. Patient denies any recent change in medications. Patient has not been receiving opiates for control of pain. No recent upper endoscopy.     PCP:  Lilton Meckel, DO    Past Medical History:   Diagnosis Date    Atrial fibrillation ([facility] [address] 11/30/2017    Borderline personality disorder ([facility] [address]     vs. traits thereof    Chlamydia infection affecting pregnancy 12/12/2017    Chronic tonsillitis     s/p tonsillectomy 2011    Conduct disorder     History of inadequate prenatal care 3/13/2014    Migraine     Mood disorder ([address] 5/19/2015    Oppositional defiant disorder     Substance abuse (HCC)     mj, alcohol, heroin        Past Surgical History:   Procedure Laterality Date    HX OTHER SURGICAL      HX TONSILLECTOMY  2011       Social History     Tobacco Use    Smoking status: Former Smoker     Packs/day: 0.25     Last attempt to quit: 11/29/2016     Years since quitting: 3.1    Smokeless tobacco: Never Used    Tobacco comment: PATIENT VAPES   Substance Use Topics    Alcohol use: Not Currently        Family History   Problem Relation Age of Onset    Anxiety Mother     Depression Mother     Alcohol abuse Mother         still using    Anxiety Father     Lupus Maternal Grandmother     Depression Sister     Depression Sister         No Known Allergies         Home Medications:  Prior to Admission Medications   Prescriptions Last Dose Informant Patient Reported? Taking?   hydrOXYzine HCl (ATARAX) 50 mg tablet 1/8/2020 at Unknown time Self Yes Yes   Sig: TAKE 1 TABLET BY MOUTH THREE TIMES A DAY AS NEEDED FOR ANXIETY   ondansetron (ZOFRAN ODT) 4 mg disintegrating tablet 1/8/2020 at Unknown time  Yes Yes   Sig: Take 4 mg by mouth every eight (8) hours as needed for Nausea or Nausea or Vomiting. pantoprazole (PROTONIX) 20 mg tablet 1/9/2020 at Unknown time Self No Yes   Sig: Take 1 Tab by mouth Daily (before breakfast).    temazepam (RESTORIL) 15 mg capsule 1/8/2020 at Unknown time Self Yes Yes   Sig: TAKE 1 CAPSULE BY MOUTH AT NIGHT   venlafaxine-SR [facility] 75 mg capsule 1/9/2020 at Unknown time Self Yes Yes   Sig: TAKE 1 CAPSULE BY MOUTH EVERY DAY      Facility-Administered Medications: None       Hospital Medications:  Current Facility-Administered Medications   Medication Dose Route Frequency    enoxaparin (LOVENOX) injection 40 mg  40 mg SubCUTAneous Q24H    acetaminophen (TYLENOL) tablet 650 mg  650 mg Oral Q6H PRN    prochlorperazine (COMPAZINE) injection 5 mg  5 mg IntraVENous Q4H PRN    cefTRIAXone (ROCEPHIN) 1 g in 0.9% sodium chloride (MBP/ADV) 50 mL ADV  1 g IntraVENous Q24H    sodium chloride (NS) flush 5-40 mL  5-40 mL IntraVENous Q8H    sodium chloride (NS) flush 5-40 mL  5-40 mL IntraVENous PRN    0.9% sodium chloride infusion  100 mL/hr IntraVENous CONTINUOUS    venlafaxine-SR (EFFEXOR-XR) capsule 75 mg  75 mg Oral DAILY WITH BREAKFAST    pantoprazole (PROTONIX) 40 mg in 0.9% sodium chloride 10 mL injection  40 mg IntraVENous DAILY       Review of Systems: Admission ROS by Jerilyn Nunes MD from 1/9/2020 were reviewed with the patient and changes (other than per HPI) include: none      Objective:     Physical Exam:  Visit Vitals  BP 95/60 (BP 1 Location: Right arm, BP Patient Position: At rest)   Pulse 70   Temp 98.1 °F (36.7 °C)   Resp 18   Ht 5' 5\" (1.651 m)   Wt 72.1 kg (159 lb)   SpO2 98%   BMI 26.46 kg/m²     SpO2 Readings from Last 6 Encounters:   01/10/20 98%   01/09/20 99%   01/07/20 97%   08/03/18 97%   08/02/18 99%   08/02/18 99%            Intake/Output Summary (Last 24 hours) at 1/10/2020 1657  Last data filed at 1/10/2020 0306  Gross per 24 hour   Intake 798.33 ml   Output    Net 798.33 ml      General: no distress, comfortable  Skin:  No rash or palpable dermatologic mass lesions  HEENT: Pupils equal, sclera anicteric, oropharynx with no gross lesions  Cardiovascular: No abnormal audible heart sounds, well perfused, no edema  Respiratory:  No abnormal audible breath sounds, normal respiratory effort, no throacic deformity  GI:   Abdomen nondistended, minimal epigastric tenderness with no mass, no free fluid, no rebound or guarding. Musculoskeletal:  No skeletal deformity nor acute arthritis noted.   Neurological:  Motor and sensory function intact in upper extremeties  Psychiatric:  Normal affect, memory intact, appears to have insight into current illness  Lymphatic:  No cervical, supraclavicular, or periumbilic lymphadenopathy    Laboratory:    Recent Results (from the past 24 hour(s))   EKG, 12 LEAD, INITIAL    Collection Time: 01/09/20  5:59 PM   Result Value Ref Range    Ventricular Rate 74 BPM    Atrial Rate 74 BPM    P-R Interval 146 ms    QRS Duration 86 ms    Q-T Interval 412 ms    QTC Calculation (Bezet) 457 ms    Calculated P Axis 57 degrees    Calculated R Axis 52 degrees    Calculated T Axis 27 degrees    Diagnosis       Normal sinus rhythm  Normal ECG  When compared with ECG of 05-SEP-2015 11:59,  Vent. rate has decreased BY  47 BPM  ST elevation now present in Inferior leads  T wave inversion no longer evident in Inferior leads  T wave inversion no longer evident in Anterolateral leads  Confirmed by Nick Gonzalez MD. ([id number]) on 1/10/2020 3:14:56 PM     SAMPLES BEING HELD    Collection Time: 01/09/20  6:06 PM   Result Value Ref Range    SAMPLES BEING HELD 1UT     COMMENT        Add-on orders for these samples will be processed based on acceptable specimen integrity and analyte stability, which may vary by analyte. CHLAMYDIA/GC PCR    Collection Time: 01/09/20  6:25 PM   Result Value Ref Range    Sample type URINE      Source URINE      Chlamydia amplified NEGATIVE  NEG      N. gonorrhea, amplified NEGATIVE  NEG      Comment        Testing performed by the [employer] Candice CT/NG method, utilizing PCR amplification to identify DNA of the pathogens. This method is not recommended as the sole method of evaluation of cases of sexual abuse nor for other medico-legal indications.    ETHYL ALCOHOL    Collection Time: 01/09/20  8:27 PM   Result Value Ref Range    ALCOHOL(ETHYL),SERUM <10 <10 MG/DL   OCCULT BLOOD, STOOL    Collection Time: 01/09/20  8:50 PM   Result Value Ref Range    Occult blood, stool NEGATIVE  NEG     METABOLIC PANEL, COMPREHENSIVE    Collection Time: 01/10/20 12:55 AM   Result Value Ref Range    Sodium 143 136 - 145 mmol/L    Potassium 3.8 3.5 - 5.1 mmol/L    Chloride 111 (H) 97 - 108 mmol/L    CO2 25 21 - 32 mmol/L    Anion gap 7 5 - 15 mmol/L    Glucose 95 65 - 100 mg/dL    BUN 5 (L) 6 - 20 MG/DL    Creatinine 0.71 0.55 - 1.02 MG/DL    BUN/Creatinine ratio 7 (L) 12 - 20      GFR est AA >60 >60 ml/min/1.73m2    GFR est non-AA >60 >60 ml/min/1.73m2    Calcium 7.4 (L) 8.5 - 10.1 MG/DL    Bilirubin, total 0.2 0.2 - 1.0 MG/DL    ALT (SGPT) 36 12 - 78 U/L    AST (SGOT) 14 (L) 15 - 37 U/L    Alk. phosphatase 65 45 - 117 U/L    Protein, total 5.5 (L) 6.4 - 8.2 g/dL    Albumin 2.9 (L) 3.5 - 5.0 g/dL    Globulin 2.6 2.0 - 4.0 g/dL    A-G Ratio 1.1 1.1 - 2.2     CBC WITH AUTOMATED DIFF    Collection Time: 01/10/20 12:55 AM   Result Value Ref Range    WBC 6.0 3.6 - 11.0 K/uL    RBC 4.19 3.80 - 5.20 M/uL    HGB 12.1 11.5 - 16.0 g/dL    HCT 37.9 35.0 - 47.0 %    MCV 90.5 80.0 - 99.0 FL    MCH 28.9 26.0 - 34.0 PG    MCHC 31.9 30.0 - 36.5 g/dL    RDW 13.1 11.5 - 14.5 %    PLATELET 913 988 - 095 K/uL    MPV 9.8 8.9 - 12.9 FL    NRBC 0.0 0  WBC    ABSOLUTE NRBC 0.00 0.00 - 0.01 K/uL    NEUTROPHILS 69 32 - 75 %    LYMPHOCYTES 16 12 - 49 %    MONOCYTES 10 5 - 13 %    EOSINOPHILS 3 0 - 7 %    BASOPHILS 2 (H) 0 - 1 %    IMMATURE GRANULOCYTES 0 0.0 - 0.5 %    ABS. NEUTROPHILS 4.2 1.8 - 8.0 K/UL    ABS. LYMPHOCYTES 0.9 0.8 - 3.5 K/UL    ABS. MONOCYTES 0.6 0.0 - 1.0 K/UL    ABS. EOSINOPHILS 0.2 0.0 - 0.4 K/UL    ABS. BASOPHILS 0.1 0.0 - 0.1 K/UL    ABS. IMM. GRANS. 0.0 0.00 - 0.04 K/UL    DF AUTOMATED           Assessment/Plan:     Active Problems:    Abdominal pain (1/9/2020)         See above narrative for full detail.

## 2020-01-10 NOTE — PROGRESS NOTES
[address] [facility] [address] [place] [address]   Office [phone]  Fax [phone]          Assessment and Plan     Wally Devine is a 21 y.o. female  w/ PMH PTSD, anxiety, ETOH Abuse and Chlamydia who is admitted for worsening abdominal pain. 24 Hour Events: No acute events. Given Temazepam 15mg x1 for insomnia. Vomited x1 this AM.     Abdominal Pain: POA RUQ abdom pain x1.5m, worsening x2d. +N/V (4-8x per day). RUQ US 20 only 1.8cm liver lesion. Diff include: gastritis (2/2 NSAIDs vs H. Pylori) vs GERD vs GB/Biliary tree pathology vs PID (hx Chlamydia). Neg preg test. HIDA neg. Mag/Phos/TSH wnl. FOBT neg  - ns IVF @ 100ml/H  - Tylenol q6H prn , IV Compazine q4H prn   - IV Protonix 40mg   - Daily CBC, CMP  - F/u GC/CT/Ucx  - F/u clinic H. Pylori stool sample   - NPO except meds pending GI c/s  - GI c/s: will appreciate recs      R/o GI bleed: POA Hgb 13.4 (nl). No hx of anemia. Endorsed mild blood at times during vomiting episodes. Likely 2/2 to retching given N/V x1.5m. Pt endorsed \"blood w/ mucus\" in stool sample. No Hx of GI scopes. FOBT neg  - IV Protonix 400mg   - SCDs for now until FOBT result  - NPO last night for GI c/s: will appreciate recs      Asymptomatic UTI: No dysuria. UA (2+ bact, LE, no nitrites). - F/u Ucx   - IV CTX 1g day 2/3       Tachycardia: POA . Likely 2/2 to N/V. Hx of SVTs per pt (prior metoprolol that was d/c'd about 1 yr ago due to pregnancy). EKG nl.  - Continue to monitor     Hepatic Lesion (R Lobe): Incidental finding on CT at [facility] ED. RUQ US 20 (likely hemangioma, 1.8cm). - F/u outpt PCP/GI: Consider further eval w/ MRI or f/u US      Hx PTSD & Anxiety: Pt on home Atarax 50mg TID prn, Velafaxine 75mg daily, Temazepam 15mg qhs   - Continue home Temazepam, Atarax  & Venlafaxine     Hx of Alcohol Abuse: UDS neg. Per , receiving Temazepam 15mg qHs (last Rx filled 1/3) for insomnia. Alcohol level neg.      FEN/GI - NPO at midnight.  Ns 100 mL/hr.  Activity - as tolerated  DVT prophylaxis - Lovenox    GI prophylaxis -  IV Protonix 40mg   Disposition - Plan to d/c to home.      CODE STATUS:  FULL  KIN: Claudell Roseau (father): [phone]    I appreciate the opportunity to participate in the care of this patient,  Faustino Smith MD  Family Medicine Resident         Subjective / Objective     Subjective : abdominal pain better today (6/10). No vomiting. XFever/chills/CP/palp. Temp (24hrs), Av °F (36.7 °C), Min:97.5 °F (36.4 °C), Max:98.4 °F (36.9 °C)     Objective  Respiratory:   O2 Device: Room air     Visit Vitals  BP 99/56 (BP 1 Location: Left arm, BP Patient Position: At rest)   Pulse 71   Temp 97.5 °F (36.4 °C)   Resp 16   Ht 5' 5\" (1.651 m)   Wt 159 lb (72.1 kg)   SpO2 98%   BMI 26.46 kg/m²       Physical Exam  General:   Alert, cooperative, no acute distress   Head:   Atraumatic   Eyes:   Conjunctivae clear   ENT:  Oral mucosa normal   Neck:  Supple, trachea midline, no adenopathy   No JVD   Back:    No CVA tenderness    Lungs:   Clear to auscultation bilaterally    Heart:   Regular rhythm, no murmur   Abdomen:    +BS. +RUQ tenderness +Shelton's, xgaurding/rigidity   No masses or organomegaly    Extremities:  No edema or DVT signs   Skin:  Warm and dry    No rashes or lesions   Neurologic:  Oriented   No focal deficits       Urinary catheter:  none         I/O:  Date 20 - 01/10/20 0659 01/10/20 0700 - 20 0659   Shift 9586-7849 8111-2266 24 Hour Total 9383-6638 8165-1483 24 Hour Total   INTAKE   I.V.(mL/kg/hr)  798.3(0.9) 798.3(0.5)        Volume (0.9% sodium chloride infusion)  798.3 798.3      Shift Total(mL/kg)  798. 3(11.1) 798. 3(11.1)      OUTPUT   Shift Total(mL/kg)         NET  798.3 798.3      Weight (kg) 72.1 72.1 72.1 72.1 72.1 72.1       Inpatient Medications  Current Facility-Administered Medications   Medication Dose Route Frequency    enoxaparin (LOVENOX) injection 40 mg  40 mg SubCUTAneous Q24H    sodium chloride (NS) flush 5-40 mL  5-40 mL IntraVENous Q8H    sodium chloride (NS) flush 5-40 mL  5-40 mL IntraVENous PRN    0.9% sodium chloride infusion  100 mL/hr IntraVENous CONTINUOUS    ketorolac (TORADOL) injection 30 mg  30 mg IntraVENous Q6H PRN    ondansetron (ZOFRAN) injection 4 mg  4 mg IntraVENous Q4H PRN    venlafaxine-SR (EFFEXOR-XR) capsule 75 mg  75 mg Oral DAILY WITH BREAKFAST    pantoprazole (PROTONIX) 40 mg in 0.9% sodium chloride 10 mL injection  40 mg IntraVENous DAILY    cefTRIAXone (ROCEPHIN) 1 g in 0.9% sodium chloride (MBP/ADV) 50 mL ADV  1 g IntraVENous Q24H         Allergies  No Known Allergies      CBC:  Recent Labs     01/10/20  0055 01/09/20  1147 01/07/20  0930   WBC 6.0 8.6 9.5   HGB 12.1 13.4 13.4   HCT 37.9 41.3 39.6    273 679       Metabolic Panel:  Recent Labs     01/10/20  0055 01/09/20  1147 01/07/20  0930    140 143   K 3.8 3.4* 4.2   * 107 105   CO2 25 26 23   BUN 5* 5* 3*   CREA 0.71 0.74 0.82   GLU 95 107* 102*   CA 7.4* 7.9* 8.8   MG  --  2.0  --    PHOS  --  3.2  --    ALB 2.9* 3.7 4.4   SGOT 14* 22 28   ALT 36 47 39*       Imaging/procedures:     HIDA 1/9 prelim: neg     EKG neg: prelim NSR, Rate 74, QTc 457       For Billing    Chief Complaint   Patient presents with    Abdominal Pain    Nausea       Hospital Problems  Date Reviewed: 11/30/2017          Codes Class Noted POA    Abdominal pain ICD-10-CM: R10.9  ICD-9-CM: 789.00  1/9/2020 Unknown

## 2020-01-10 NOTE — PROGRESS NOTES
Discharge instructions and prescriptions reviewed with the patient and all questions answered. Peripheral IV removed. Patient discharged home via wheelchair with fiance.

## 2020-01-10 NOTE — PROGRESS NOTES
Problem: Falls - Risk of  Goal: *Absence of Falls  Description  Document Jacques Guerrero Fall Risk and appropriate interventions in the flowsheet.   Outcome: Progressing Towards Goal  Note: Fall Risk Interventions:            Medication Interventions: Bed/chair exit alarm, Patient to call before getting OOB

## 2020-01-10 NOTE — DISCHARGE INSTRUCTIONS
HOME DISCHARGE INSTRUCTIONS    Kalina Deleon / [id number] : 1995    Admission date: 2020 Discharge date: 1/10/2020     Please bring this form with you to show your care provider at your follow-up appointment. Primary care provider:  Merline Mallet, DO    Discharging provider:  Javan Odom MD  - Family Medicine Resident  Dr. Alma Hawthorne - Attending, Family Medicine     You have been admitted to the hospital with the following diagnoses:    ACUTE DIAGNOSES:  Abdominal pain [R10.9]  Abdominal pain [R10.9]  . . . . . . . . . . . . . . . . . . . . . . . . . . . . . . . . . . . . . . . . . . . . . . . . . . . . . . . . . . . . . . . . . . . . . . . . MEDICATION CHANGES:  - TAKE COMPAZINE 30MG EVERY 6 HOURS FOR NAUSEA  - TAKE PROTONIX 40mg every day  - TAKE KEFLEX 500MG EVERY 12 HOURS for 1 day (starting tomorrow)  - STOP ZOFRAN  * Continue all other home medications       FOLLOW-UP CARE RECOMMENDATIONS: Follow up with GI    Appointments  Follow-up Information     Follow up With Specialties Details Why Contact Info    Vivian Steiner MD [facility] Go on 2020 For hospital follow-up on 20 at [address] [place] [address]  [phone]      Sayda Bella MD Gastroenterology Schedule an appointment as soon as possible for a visit in 1 week For follow-up with gastroenteologist [address] [place] [address]  [phone]             Follow-up tests needed: NONE    Pending test results: At the time of your discharge the following test results are still pending:  H. Pylori stool, Ucx. Please make sure you review these results with your outpatient follow-up provider(s). Specific symptoms to watch for: chest pain, shortness of breath, fever, chills, nausea, vomiting, diarrhea, change in mentation, falling, weakness, bleeding.      DIET/what to eat:  Regular Diet as tolerated     ACTIVITY:  Activity as tolerated    Wound care: none    Equipment needed:  none    What to do if new or unexpected symptoms occur? If you experience any of the above symptoms (or should other concerns or questions arise after discharge) please call your primary care physician. Return to the emergency room if you cannot get hold of your doctor. · It is very important that you keep your follow-up appointment(s). · Please bring discharge papers, medication list (and/or medication bottles) to your follow-up appointments for review by your outpatient provider(s). · Please check the list of medications and be sure it includes every medication (even non-prescription medications) that your provider wants you to take. · It is important that you take the medication exactly as they are prescribed. · Keep your medication in the bottles provided by the pharmacist and keep a list of the medication names, dosages, and times to be taken in your wallet. · Do not take other medications without consulting your doctor. · If you have any questions about your medications or other instructions, please talk to your nurse or care provider before you leave the hospital.     Information obtained by:     I understand that if any problems occur once I am at home I am to contact my physician. These instructions were explained to me and I had the opportunity to ask questions. I understand and acknowledge receipt of the instructions indicated above.                                                                                                                                                Physician's or R.N.'s Signature                                                                  Date/Time                                                                                                                                              Patient or Representative Signature                                                          Date/Time

## 2020-01-10 NOTE — DISCHARGE SUMMARY
[facility]   Discharge/Transfer/Off-Service Note      Date: January 10, 2020    Barb Mccormick  MRN: [id number]  YOB: 1995  Age: 21 y.o. Date of admission: 1/9/2020     Date of discharge/transfer: 1/10/2020    Primary Care Physician: Mariano Llanes DO     Attending physician at admission: Dr. Norris [address]     Attending physician at discharge/transfer: Dr. Jerry Buenrostro     Resident physician at discharge/transfer: Willie Stratton MD     Consultants during hospitalization:  GI c/s     Admission diagnoses:   Abdominal pain [R10.9]  Abdominal pain [R10.9]    Discharge diagnoses:  Hospital Problems  Date Reviewed: 11/30/2017          Codes Class Noted POA    Abdominal pain ICD-10-CM: R10.9  ICD-9-CM: 789.00  1/9/2020 Unknown            MEDICATION CHANGES:  - TAKE COMPAZINE 30MG EVERY 6 HOURS FOR NAUSEA  - TAKE PROTONIX 40mg every day  - TAKE KEFLEX 500MG EVERY 12 HOURS for 1 day (starting tomorrow)  - STOP ZOFRAN  * Continue all other home medications       History of Present Illness per admitting resident Dr. [place]:  Barb Mccormick is a 21 y.o. female w/ PMH PTSD, anxiety, ETOH Abuse and Chlamydia who presents to the ER complaining of worsening abdominal pain x2 days, as well as nausea and vomiting (4-8x per day) for 1.5months. Pain is 8/10, located at the RUQ, intermittent, non-radiating, not alleviated by anything in particular. Pt has not been able to keep down her normal foods and has been only able to tolerate soups and bread. She has been fatigued as a result of her constant vomiting. She also endorses some small blood in her sputum after vomiting a few times as well as \"blood w/ mucus\" in her H.pylori stool sample given at [facility] 1/8/20. Pt was at [facility] on 1/7 and today for same complaints and was subsequently sent to [facility] for further evaluation w/ GI consultation.      Pt denies new foods/traveling/fever/chills/CP/palp/SOB/dysuria.    Of note, pt had a RUQ US the day prior at clinic which was neg except for a hepatic hemangioma (also seen in a CT scan in 12/19 at [facility] [place] [facility]). Per pt, the CT scan at [address] [place] [address] also showed an ovarian cyst, for which her OBGYN recommended f/u imaging in 5 months and f/u w/ her PCP.      Also, she denies any current substance use however, she has a Hx of Alcohol Abuse (dgx in 2016) and endorses cessation at that time after going to a treatment facility.       ED:  Vitals: bp 121/84, P425-635, RR18, spO2 99% RA  Lab: CBC wnl, CMP (K. 3.4), Lipase wnl, Preg test neg, UA (2+ bact, trace LE, neg nitrites)  Imaging: CXR neg, CT Abd-Pelv neg   Tx: 1L IVF, IV Zofran 8mg, Toradol 30mg [facility] course with associated diagnosis:     Abdominal Pain: UNCLEAR ETIOLOGY. However, Likely 2/2 to MSK pain from constant N/V. POA RUQ abdom pain x1.5m, worsening x2d. +N/V (4-8x per day). RUQ US 1/7/20 was pertinent for only 1.8cm liver lesion. CT Ab-Pelv was also unremarkable and only showed \"12 mm hypoattenuating lesion in right hepatic lobe corresponding with  ultrasound finding, probable hemangioma. Liver otherwise normal. No biliary duct  dilation demonstrated\". Differentials included gastritis (2/2 NSAIDs vs H. Pylori) vs GERD vs hepatobiliary pathology vs PID (hx Chlamydia). Preg test was neg. However, all workup was negative. HIDA neg. Mag/Phos/TSH wnl. FOBT neg. Pt's was managed w/ Toradol, Tylenol, zofran & Compazine. IV Protonix 40mg. GI was consulted and they recommended f/u in oupt. Before d/c, pt endorsed symptoms have improved w/o nausea and has been tolerating a light diet. GC/CT neg.   - F/u Ucx, H. Pylori stool sample - f/u outpt PCP  - START Protonix 40mg daily - per GI recs   - START Compazine 30mg every 6 hours  - F/u outpt GI per GI recs      R/o GI bleed: RULED-OUT. POA Hgb 13.4 (nl). Likley 2/2 to severe retching and GI irritation. No hx of anemia.  Endorsed mild blood at times during vomiting episodes. Likely 2/2 to retching given N/V x1.5m. Pt endorsed \"blood w/ mucus\" in stool sample. No Hx of GI scopes. FOBT neg.   - F/u outpt PCP     Asymptomatic UTI: No dysuria. UA (2+ bact, LE, no nitrites). Pt txt w/ IV Ceftriaxone for 2 days. - F/u oupt PCP: f/u Ucx   - TAKE KEFLEX 500MG EVERY 12 HOURS for 1 day (starting tomorrow)     Tachycardia: RESOLVED. POA . Likely 2/2 to N/V. Hx of SVTs per pt (prior metoprolol that was d/c'd about 1 yr ago due to pregnancy). EKG nl.     Hepatic Lesion (R Lobe): [place] [facility] RUQ US 1/7/20 (likely hemangioma, 1.8cm). - F/u outpt PCP/GI: Per guidelines, no further f/u giving <5cm. However, may consider further eval w/ MRI or f/u US if enlarges. Hx PTSD & Anxiety: Pt on home Atarax 50mg TID prn, Velafaxine 75mg daily & Temazepam 15mg qhs.  - Continue home Temazepam, Atarax  & Venlafaxine      Hx of Alcohol Abuse: UDS neg. Per , receiving Temazepam 15mg qHs (last Rx filled 1/3) for insomnia. Alcohol level neg.   - Continue abstinence      Physical exam at discharge:   Visit Vitals  BP 95/60 (BP 1 Location: Right arm, BP Patient Position: At rest)   Pulse 70   Temp 98.1 °F (36.7 °C)   Resp 18   Ht 5' 5\" (1.651 m)   Wt 159 lb (72.1 kg)   LMP 01/01/2020   SpO2 98%   BMI 26.46 kg/m²       Gen: Awake, alert, NAD  Eye: PERRL, EOMI  Cards: RRR, no m/r/g, DP intact bilaterally  Resp: CTAB, no w/r/r  Abd: Normal BS, less tenderness at RUQ and R upper Ribs. Guarding w/ deep palpation. No rigidity/rebound tenderness. Ext: No swelling. No tenderness.     Condition at discharge: Stable    Disposition: Home     Recommended follow-up tests after discharge: none     Diet: Normal as tolerated    Activity: As tolerated    Labs:  Recent Labs     01/10/20  0055 01/09/20  1147   WBC 6.0 8.6   HGB 12.1 13.4   HCT 37.9 41.3    273     Recent Labs     01/10/20  0055 01/09/20  1147    140   K 3.8 3.4*   * 107   CO2 25 26   BUN 5* 5*   CREA 0.71 0.74   GLU 95 107*   CA 7.4* 7.9*   MG  --  2.0   PHOS  --  3.2     Recent Labs     01/10/20  0055 01/09/20  1147   SGOT 14* 22   ALT 36 47   AP 65 81   TBILI 0.2 0.4   TP 5.5* 6.9   ALB 2.9* 3.7   GLOB 2.6 3.2   LPSE  --  134     No results for input(s): INR, PTP, APTT, INREXT, INREXT in the last 72 hours. No results for input(s): FE, TIBC, PSAT, FERR in the last 72 hours. No results for input(s): PH, PCO2, PO2 in the last 72 hours. No results for input(s): CPK, CKMB in the last 72 hours. No lab exists for component: TROPONINI  Lab Results   Component Value Date/Time    Glucose  05/24/2018 09:46 AM        All Micro Results     Procedure Component Value Units Date/Time    CHLAMYDIA/GC PCR [[id number]] Collected:  01/09/20 1825    Order Status:  Completed Specimen:  Other from Genital specimen Updated:  01/10/20 1421     Sample type URINE        Source URINE        Chlamydia amplified NEGATIVE         N. gonorrhea, amplified NEGATIVE         Comment       Testing performed by the Roche Candice CT/NG method, utilizing PCR amplification to identify DNA of the pathogens. This method is not recommended as the sole method of evaluation of cases of sexual abuse nor for other medico-legal indications. Comment: Cultures are recommended in these cases. As with all laboratory tests, these results should be interpreted in conjunction with the patient's clinical presentation. CULTURE, URINE [[id number]] Collected:  01/09/20 1616    Order Status:  Completed Specimen:  Urine from Clean catch Updated:  01/09/20 2017          Diagnostic Studies and Procedures:    CXR: IMPRESSION:  No acute process     CT Abd-Pelvis:   LUNG BASES: Clear. INCIDENTALLY IMAGED HEART AND MEDIASTINUM: Unremarkable. LIVER: 12 mm hypoattenuating lesion in right hepatic lobe corresponding with  ultrasound finding, probable hemangioma. Liver otherwise normal. No biliary duct  dilation demonstrated.   GALLBLADDER: Unremarkable. SPLEEN: No mass. PANCREAS: No mass or ductal dilatation. ADRENALS: Unremarkable. KIDNEYS: No mass, calculus, or hydronephrosis. STOMACH: Unremarkable. SMALL BOWEL: No dilatation or wall thickening. COLON: No dilatation or wall thickening. APPENDIX: Unremarkable. PERITONEUM: No ascites or pneumoperitoneum. RETROPERITONEUM: No lymphadenopathy or aortic aneurysm. REPRODUCTIVE ORGANS: Unremarkable. URINARY BLADDER: No mass or calculus. BONES: Appear normal.  ADDITIONAL COMMENTS: N/A  MPRESSION:  No acute process     HIDA 1/9 prelim: neg      EKG neg: prelim NSR, Rate 74, QTc 457    Discharge/Transfer Medications:  Current Discharge Medication List      START taking these medications    Details   prochlorperazine (COMPAZINE) 5 mg tablet Take 1 Tab by mouth every six (6) hours as needed for Nausea for up to 30 days. Qty: 30 Tab, Refills: 0      cephALEXin (KEFLEX) 500 mg capsule Take 1 Cap by mouth two (2) times a day for 1 day. Qty: 2 Cap, Refills: 0         CONTINUE these medications which have CHANGED    Details   pantoprazole (PROTONIX) 40 mg tablet Take 1 Tab by mouth daily. Qty: 30 Tab, Refills: 0         CONTINUE these medications which have NOT CHANGED    Details   hydrOXYzine HCl (ATARAX) 50 mg tablet TAKE 1 TABLET BY MOUTH THREE TIMES A DAY AS NEEDED FOR ANXIETY      temazepam (RESTORIL) 15 mg capsule TAKE 1 CAPSULE BY MOUTH AT NIGHT      venlafaxine-SR (EFFEXOR-XR) 75 mg capsule TAKE 1 CAPSULE BY MOUTH EVERY DAY         STOP taking these medications       ondansetron (ZOFRAN ODT) 4 mg disintegrating tablet Comments:   Reason for Stopping: Follow up plans/appointments:   Follow-up Information     Follow up With Specialties Details Why Contact Info    Spring Chowdhury MD Family Practice Go on 1/13/2020 For hospital follow-up on 1/13/20 at [address] [place] [address]      Daquan Franco MD Gastroenterology Schedule an appointment as soon as possible for a visit in 1 week For follow-up with gastroenteologist Agata Smith [address]  [phone]             Discharge instructions to patient/family  Please seek medical attention for any new or worsening symptoms particularly fever, chest pain, shortness of breath, abdominal pain, nausea, vomiting      Ahmet Leo MD  Family Medicine Resident      Cc: Tricia Boudreaux, DO

## 2020-01-10 NOTE — PROGRESS NOTES
Spiritual Care Partner Volunteer visited patient on the [facility] Surgical Unit on 1/10/20. Documented by:  Casey Garcia.      Paging Service: [phone]

## 2020-01-10 NOTE — PROGRESS NOTES
Bedside and Verbal shift change report given to Shannan (oncoming nurse) by Elaina Richards RN  (offgoing nurse). Report included the following information SBAR, Kardex, Intake/Output, MAR and Recent Results.

## 2020-01-10 NOTE — PROGRESS NOTES
1/10/2020  9:40 AM    EMR Review, admitted: POA RUQ abdom pain x1.5m, worsening x2d. +N/V (4-8x per day). RUQ US 1/7/20 only 1.8cm liver lesion. Diff include: gastritis (2/2 NSAIDs vs H. Pylori) vs GERD vs GB/Biliary tree pathology vs PID (hx Chlamydia).     Transition of Care Plan:           1.home with family assistance  2. PCP follow up  3. GI follow up  4. CM to following for needs.     Hilton Leyden

## 2020-01-11 LAB — H PYLORI AG STL QL IA: NEGATIVE

## 2020-01-14 ENCOUNTER — PATIENT OUTREACH (OUTPATIENT)
Dept: CARDIOLOGY CLINIC | Age: 25
End: 2020-01-14

## 2020-01-14 NOTE — PROGRESS NOTES
Hospital Discharge Follow-Up      Date/Time:  2020 3:22 PM  Patricia Randhawa RN, Care Transition Nurse  [address] [place] [address] Ambulatory Care Coordination Team  Direct phone:  [phone]    Patient was admitted to [facility] on 20 and discharged on 1/10/20 for RUQ Abdominal Pain. The physician discharge summary was available at the time of outreach. Patient was contacted within 2 business days of discharge. Top Challenges reviewed with the provider   PCP- relayed that she was not aware of scheduled appt for Monday- she will call to reschedule after she secures GI speciality f/u appointment. Advance Care Planning:   Does patient have an Advance Directive:  not on file        Method of communication with provider :none    Inpatient RRAT score: not documented  Was this a readmission? no   Note- she has been seen in [facility] ED on 19- abdominal pain; 19-vertigo, confusion, vomiting and headache. 10/8/19- in-pt admission for Behavioral Health. 3/4/17 and 3/1/17 in ED at [facility] for \"tachycardia\"- was evaluated and discharged back to [facility] where she was being treat for ETOH abuse. Had scheduled repeat  done at [facility] on -18. Care Transition Nurse (CTN) contacted the patient by telephone to perform post hospital discharge assessment. Verified name and  with patient as identifiers. Provided introduction to self, and explanation of the CTN role. Patient received hospital discharge instructions. CTN reviewed discharge instructions and red flags with patient who verbalized understanding. Patient given an opportunity to ask questions and does not have any further questions or concerns at this time. The patient agrees to contact the PCP office for questions related to their healthcare. CTN provided contact information for future reference.     Disease Specific:   N/A    Patients top risk factors for readmission:  depression, functional cognitive ability, ineffective coping, lack of knowledge about disease, medical condition, multi health system providers, utilization of services    Home Health orders at discharge: none    Durable Medical Equipment ordered at discharge: none    Medication(s):   New Medications at Discharge: keflex 500 mg 1 tablet BID for 1 day- dispensed 2 tabs. Compazine 5 mg tablet- 1 tab Q6H PRN for nausea- dispensed 30 tabs   protonix 40 mg daily before breakfast   Changed Medications at Discharge: none  Discontinued Medications at Discharge: zofran 4 mg     Medication reconciliation was performed with patient, who verbalizes understanding of administration of home medications. There were barriers to obtaining medications identified at this time. She states that she did not fill the compazine- has a phone call out to her psychiatrist to see if she is able to take this medication along with other medications she is prescribed. Referral to Pharm D needed: no     Current Outpatient Medications   Medication Sig    pantoprazole (PROTONIX) 40 mg tablet Take 1 Tab by mouth daily.  hydrOXYzine HCl (ATARAX) 50 mg tablet TAKE 1 TABLET BY MOUTH THREE TIMES A DAY AS NEEDED FOR ANXIETY    temazepam (RESTORIL) 15 mg capsule TAKE 1 CAPSULE BY MOUTH AT NIGHT    venlafaxine-SR (EFFEXOR-XR) 75 mg capsule TAKE 1 CAPSULE BY MOUTH EVERY DAY    prochlorperazine (COMPAZINE) 5 mg tablet Take 1 Tab by mouth every six (6) hours as needed for Nausea for up to 30 days. No current facility-administered medications for this visit. There are no discontinued medications. [facility] follow up appointment(s): No future appointments. Relayed that she had missed a PCP appointment yesterday- she states that she was not aware of the appointment. Asked her to call after she secures GI appointment- to schedule with PCP- target seeing one doctor within 5-7 days post discharge and 10-12 for the second provider.    Non-[facility] follow up appointment(s):   GI- will check in for appt with Dr. Rufus Jiménez- she is calling. [facility]:  n/a       Goals        Post Hospitalization     Understands red flags post discharge. 1/14/20- spoke with Ms. Chakraborty- she states that she is eating smaller meals and has avoided foods that would \"upseting\" to her stomach. Has had not further emesis, feels better than she has felt for a few weeks since leaving the hospital. Asked her to reschedule missed PCP appointment. Encouraged her to schedule GI follow up appointment. She said she was calling to schedule. She is not working so she will not need excuse.   LL[employer]

## 2020-01-16 ENCOUNTER — TELEPHONE (OUTPATIENT)
Dept: HEMATOLOGY | Age: 25
End: 2020-01-16

## 2020-01-16 NOTE — TELEPHONE ENCOUNTER
LVM for patient to call office back to set up an appt to see Dr. Saul Herron on Jan. 24th for liver mass.

## 2020-01-24 ENCOUNTER — OFFICE VISIT (OUTPATIENT)
Dept: HEMATOLOGY | Age: 25
End: 2020-01-24

## 2020-01-24 VITALS
SYSTOLIC BLOOD PRESSURE: 114 MMHG | HEART RATE: 103 BPM | WEIGHT: 157.8 LBS | HEIGHT: 65 IN | TEMPERATURE: 98 F | BODY MASS INDEX: 26.29 KG/M2 | DIASTOLIC BLOOD PRESSURE: 74 MMHG | OXYGEN SATURATION: 99 %

## 2020-01-24 DIAGNOSIS — R16.0 LIVER MASS: Primary | ICD-10-CM

## 2020-01-24 RX ORDER — CLONAZEPAM 1 MG/1
TABLET ORAL 2 TIMES DAILY
COMMUNITY
End: 2021-02-16

## 2020-01-24 RX ORDER — HYOSCYAMINE SULFATE 0.12 MG/1
0.25 TABLET SUBLINGUAL
Qty: 120 TAB | Refills: 3 | Status: SHIPPED | OUTPATIENT
Start: 2020-01-24 | End: 2021-02-16

## 2020-01-24 NOTE — PROGRESS NOTES
Emma Franklin is a 21 y.o. female  No chief complaint on file. Visit Vitals  /74 (BP 1 Location: Left arm, BP Patient Position: Sitting)   Pulse (!) 103   Temp 98 °F (36.7 °C) (Tympanic)   Ht 5' 5\" (1.651 m)   Wt 157 lb 12.8 oz (71.6 kg)   SpO2 99%   BMI 26.26 kg/m²      - patient states this is normal , has HX of elevated HR has no current SX. Provider notified via exam room white board. 3 most recent PHQ Screens 1/24/2020   Little interest or pleasure in doing things Not at all   Feeling down, depressed, irritable, or hopeless Not at all   Total Score PHQ 2 0     Learning Assessment 1/24/2020   PRIMARY LEARNER Patient   HIGHEST LEVEL OF EDUCATION - PRIMARY LEARNER  SOME COLLEGE   BARRIERS PRIMARY LEARNER NONE   CO-LEARNER CAREGIVER No   PRIMARY LANGUAGE ENGLISH   LEARNER PREFERENCE PRIMARY DEMONSTRATION   ANSWERED BY patient   RELATIONSHIP SELF     Abuse Screening Questionnaire 1/24/2020   Do you ever feel afraid of your partner? N   Are you in a relationship with someone who physically or mentally threatens you? N   Is it safe for you to go home?  Karl Wilkes

## 2020-01-24 NOTE — Clinical Note
2/8/20 Patient: Emma Franklin YOB: 1995 Date of Visit: 1/24/2020 Neva Alejandro, [address] [address] VIA In Basket Dear Neva Alejandro DO, Thank you for referring Ms. Esperanza Bardales to [facility] for evaluation. My notes for this consultation are attached. If you have questions, please do not hesitate to call me. I look forward to following your patient along with you. Sincerely, Isela Cowan MD

## 2020-01-24 NOTE — PROGRESS NOTES
[address] [facility], MD, MD Omi Gaitan, MATTHEW sheppard, [facility]     Jody Kamara, [facility]   ALONZO Acosta-GATITO Agosto, [facility]       Aliza Strong [address] [place] 136    at [address], [id number] [place] [address]    [address] [facility].    [phone]    FAX: [address] [place] [address], [address], [address]    [phone]    FAX: [phone]       Patient Care Team:  Osiel Stanton DO as PCP - General (Family Practice)  Kristen Skelton MD as PCP - [facility] Empaneled Provider  None  Ale Friend RN as Care Transitions Nurse      Problem List  Date Reviewed: 11/30/2017          Codes Class Noted    Abdominal pain ICD-10-CM: R10.9  ICD-9-CM: 789.00  1/9/2020        Rh negative state in antepartum period ICD-10-CM: O26.899, Z67.91  ICD-9-CM: 646.83  3/29/2018        Chlamydia infection affecting pregnancy ICD-10-CM: O98.819, A74.9  ICD-9-CM: 647.60, 079.98  12/12/2017    Overview Signed 12/12/2017  4:04 PM by Kristen Skelton MD     In the first trimester             Bacteriuria during pregnancy ICD-10-CM: O99.89, R82.71  ICD-9-CM: 646.50  12/8/2017    Overview Signed 12/8/2017 10:35 PM by Kristen Skelton MD     UCx with E.coli, treated with Keflex             Tachycardia ICD-10-CM: R00.0  ICD-9-CM: 785.0  11/30/2017    Overview Signed 3/29/2018  1:17 PM by Kristen Skelton MD     The patient reports having Afib. I personally talked to Dr. Shy Contreras office ( cardiologist) who said that the patient had tachycardia and was put on Metoprolol. He recommended to stop Metoprolol and no follow was indicated.               Polysubstance dependence ([address] ICD-10-CM: X12.03  ICD-9-CM: 304.80 9/7/2015        PTSD (post-traumatic stress disorder) ICD-10-CM: F43.10  ICD-9-CM: 309.81  9/7/2015        Alcohol intoxication ([facility] ICD-10-CM: U33.606  ICD-9-CM: 305.00  9/7/2015        Borderline personality disorder ([facility] ICD-10-CM: F60.3  ICD-9-CM: 301.83  9/7/2015        Antisocial personality disorder ([facility] ICD-10-CM: F60.2  ICD-9-CM: 301.7  9/7/2015        Oppositional defiant disorder ICD-10-CM: F91.3  ICD-9-CM: 313.81  9/7/2015        Conduct disorder ICD-10-CM: F91.9  ICD-9-CM: 312.9  9/7/2015        Depression with suicidal ideation ICD-10-CM: F32.9, R45.851  ICD-9-CM: 688, V62.84  9/6/2015        Overdose ICD-10-CM: T50.901A  ICD-9-CM: 977.9, E980.5  9/5/2015        Migraine ICD-10-CM: B66.942  ICD-9-CM: 346.90  7/1/2015        Skin change ICD-10-CM: R23.9  ICD-9-CM: 782.9  7/1/2015        Mood disorder ([facility] ICD-10-CM: V35  ICD-9-CM: 296.90  5/19/2015                The clinicians listed above have asked me to see Ольга Calvillo in consultation regarding a liver mass. All medical records sent by the referring physicians were reviewed including imaging studies     The patient is a 21 y.o.  female without any history of previous liver disease. There is a history of IVDA but HCV is negative    The patient underwent an ultrasound and then a CT scan for evaluation of non-specific abdominal pain in 1/2018. This demonstrated a single mass measuring 1.2 cm by ultrasound and 1.3 x 1.8 cm by CT scan in the right lobe of the liver. The patient has the following symptoms which are thought to be due to the liver disease:  pain in the right side over the liver, nausea and vomiting    The patient is not currently experiencing the following symptoms of liver disease:  Constipation and diarrhea,     The patient completes all daily activities without any functional limitations. ASSESSMENT AND PLAN:  Liver mass   This is most likely to be hemangioma.   Hemangiomas are benign and in general do not increase in size over time. Will measure the following serologic cancer markers AFP. CA 19-9. CEA. All were negative    Serologic testing for causes of chronic liver disease were ordered. Results were negative for HBV, HCV    Will perform imaging of the liver with dynamic MRI. Treatment of other medical problems in patients with chronic liver disease  There are no contraindications for the patient to take most medications that are necessary for treatment of other medical issues. Counseling for alcohol in patients with chronic liver disease  The patient was counseled regarding alcohol consumption and the effect of alcohol on chronic liver disease. The patient does not consume any significant amount of alcohol. Vaccinations   The need for vaccination against viral hepatitis A and B will be assessed with serologic and instituted as appropriate. Routine vaccinations against other bacterial and viral agents can be performed as indicated. Annual flu vaccination should be administered if indicated. ALLERGIES  No Known Allergies    MEDICATIONS  Current Outpatient Medications   Medication Sig    clonazePAM (KLONOPIN) 1 mg tablet Take  by mouth two (2) times a day. 1mg at night , half extra tab during the day PRN    prochlorperazine (COMPAZINE) 5 mg tablet Take 1 Tab by mouth every six (6) hours as needed for Nausea for up to 30 days.  pantoprazole (PROTONIX) 40 mg tablet Take 1 Tab by mouth daily.  hydrOXYzine HCl (ATARAX) 50 mg tablet TAKE 1 TABLET BY MOUTH THREE TIMES A DAY AS NEEDED FOR ANXIETY    venlafaxine-SR (EFFEXOR-XR) 75 mg capsule TAKE 1 CAPSULE BY MOUTH EVERY DAY    temazepam (RESTORIL) 15 mg capsule TAKE 1 CAPSULE BY MOUTH AT NIGHT     No current facility-administered medications for this visit. SYSTEM REVIEW NOT RELATED TO LIVER DISEASE OR REVIEWED ABOVE:  Constitution systems: Negative for fever, chills, weight gain, weight loss.    Eyes: Negative for visual changes. ENT: Negative for sore throat, painful swallowing. Respiratory: Negative for cough, hemoptysis, SOB. Cardiology: Negative for chest pain, palpitations. GI:  Negative for constipation or diarrhea. : Negative for urinary frequency, dysuria, hematuria, nocturia. Skin: Negative for rash. Hematology: Negative for easy bruising, blood clots. Musculo-skelatal: Negative for back pain, muscle pain, weakness. Neurologic: Negative for headaches, dizziness, vertigo, memory problems not related to HE. Psychology: Negative for anxiety, depression. FAMILY HISTORY:  The father Has/had the following following chronic disease(s): None. The mother Has/had the following chronic disease(s): None. There is no family history of liver disease. SOCIAL HISTORY:  The patient has never been  with steady partner. The patient has 2 children,   The patient stopped using tobacco products in 2017. The patient has previously consumed alcohol socially never in excess. The patient has been abstinent from excessive alcohol since 2017. But has tried a rare alcohol drink only on holidays   The patient has a history of History IVDA. He stopped all drug use in 2016. The patient does not work outside the home. PHYSICAL EXAMINATION:  Visit Vitals  /74 (BP 1 Location: Left arm, BP Patient Position: Sitting)   Pulse (!) 103   Temp 98 °F (36.7 °C) (Tympanic)   Ht 5' 5\" (1.651 m)   Wt 157 lb 12.8 oz (71.6 kg)   LMP 01/01/2020   SpO2 99%   BMI 26.26 kg/m²     General: No acute distress. Eyes: Sclera anicteric. ENT: No oral lesions. Thyroid normal.  Nodes: No adenopathy. Skin: No spider angiomata. No jaundice. No palmar erythema. Respiratory: Lungs clear to auscultation. Cardiovascular: Regular heart rate. No murmurs. No JVD. Abdomen: Soft non-tender. Liver size normal to percussion/palpation. Spleen not palpable. No obvious ascites. Extremities: No edema.   No muscle wasting. No gross arthritic changes. Neurologic: Alert and oriented. Cranial nerves grossly intact. No asterixis. LABORATORY STUDIES:  Liver Maria Stein of [address] & Units 1/10/2020 1/9/2020   WBC 3.6 - 11.0 K/uL 6.0 8.6   ANC 1.8 - 8.0 K/UL 4.2 5.6   HGB 11.5 - 16.0 g/dL 12.1 13.4    - 400 K/uL 252 273   AST 15 - 37 U/L 14 (L) 22   ALT 12 - 78 U/L 36 47   Alk Phos 45 - 117 U/L 65 81   Bili, Total 0.2 - 1.0 MG/DL 0.2 0.4   Albumin 3.5 - 5.0 g/dL 2.9 (L) 3.7   BUN 6 - 20 MG/DL 5 (L) 5 (L)   Creat 0.55 - 1.02 MG/DL 0.71 0.74   Na 136 - 145 mmol/L 143 140   K 3.5 - 5.1 mmol/L 3.8 3.4 (L)   Cl 97 - 108 mmol/L 111 (H) 107   CO2 21 - 32 mmol/L 25 26   Glucose 65 - 100 mg/dL 95 107 (H)   Magnesium 1.6 - 2.4 mg/dL  2.0     SEROLOGIES:  Serologies Latest Ref Rng & Units 1/16/2018 5/19/2015   Hep B Surface Ag Negative Negative Negative   Hep B Surface AB QL   Non Reactive   Hep C Ab 0.0 - 0.9 s/co ratio  <0.1     LIVER HISTOLOGY:  Not available or performed    ENDOSCOPIC PROCEDURES:  Not available or performed    RADIOLOGY:  1/2020. Ultrasound of liver. Normal appearing liver. Hyperechoic mass measuring 1.8 cm   1/2020. CT scan abdomen with IV contrast.  Normal appearing liver. Liver mass consistent with hemangioma in the   right lobe measuring 1.2 cm. OTHER TESTING:  Not available or performed    FOLLOW-UP:  All of the issues listed above in the Assessment and Plan were discussed with the patient. All questions were answered. The patient expressed a clear understanding of the above. [facility] 87 in 3 months which should be 1-2 weeks after the next imaging study.         MD Reginaldo Soriano[address], [address] [place] [address] 22.  [phone]  [address] [facility]

## 2020-02-02 ENCOUNTER — OP HISTORICAL/CONVERTED ENCOUNTER (OUTPATIENT)
Dept: OTHER | Age: 25
End: 2020-02-02

## 2020-02-21 ENCOUNTER — PATIENT OUTREACH (OUTPATIENT)
Dept: CARDIOLOGY CLINIC | Age: 25
End: 2020-02-21

## 2020-02-21 NOTE — PROGRESS NOTES
Patient has graduated from the Transitions of Care Coordination  program on 2/9/20. Patient/family has the ability to self-manage at this time Care management goals have been completed. Patient was not referred to the [facility] team for further management. Liver [place] has in office NN in place for support and assistance. Goals Addressed                 This Visit's Progress       Post Hospitalization     Understands red flags post discharge. 2/21/20- review of EMR shows that she attended Liver OV with Dr. Blanka Goldberg on 1/24- has next scheduled on 4/24 in place. She has not followed up with PCP. [facility]     1/14/20- spoke with Ms. Chakraborty- she states that she is eating smaller meals and has avoided foods that would \"upseting\" to her stomach. Has had not further emesis, feels better than she has felt for a few weeks since leaving the hospital. Asked her to reschedule missed PCP appointment. Encouraged her to schedule GI follow up appointment. She said she was calling to schedule. She is not working so she will not need excuse. [employer]              Patient has Care Transition Nurse's contact information for any further questions, concerns, or needs.   Patients upcoming visits:    Future Appointments   Date Time Provider Chaz Whitfield   4/24/2020 10:00 AM Betty Foy MD [facility]

## 2020-06-09 ENCOUNTER — VIRTUAL VISIT (OUTPATIENT)
Dept: HEMATOLOGY | Age: 25
End: 2020-06-09

## 2020-06-09 NOTE — PROGRESS NOTES
Did not get labs or MRI done. No charge. Reschedule for 4 weeks.     MD Alberto Naqvi 13  [address], [address] [place] [address] 22.  [phone]  [address] [place] [address]

## 2020-10-19 ENCOUNTER — NURSE TRIAGE (OUTPATIENT)
Dept: OTHER | Facility: CLINIC | Age: 25
End: 2020-10-19

## 2020-10-19 NOTE — TELEPHONE ENCOUNTER
Reason for Disposition   [1] COVID-19 infection suspected by caller or triager AND [2] mild symptoms (cough, fever, or others) AND [2] no complications or SOB    Answer Assessment - Initial Assessment Questions  1. COVID-19 DIAGNOSIS: \"Who made your Coronavirus (COVID-19) diagnosis? \" \"Was it confirmed by a positive lab test?\" If not diagnosed by a HCP, ask \"Are there lots of cases (community spread) where you live? \" (See public health department website, if unsure)      *No Answer*  2. ONSET: \"When did the COVID-19 symptoms start? \"       3 days    3. WORST SYMPTOM: \"What is your worst symptom? \" (e.g., cough, fever, shortness of breath, muscle aches)      Cough    4. COUGH: \"Do you have a cough? \" If so, ask: \"How bad is the cough? \"        Yes and bad    5. FEVER: \"Do you have a fever? \" If so, ask: \"What is your temperature, how was it measured, and when did it start? \"      Yes, 102.0    6. RESPIRATORY STATUS: \"Describe your breathing? \" (e.g., shortness of breath, wheezing, unable to speak)       Sob laying around    7. BETTER-SAME-WORSE: Earnestine Heck you getting better, staying the same or getting worse compared to yesterday? \"  If getting worse, ask, \"In what way? \"      Same    8. HIGH RISK DISEASE: \"Do you have any chronic medical problems? \" (e.g., asthma, heart or lung disease, weak immune system, etc.)      *No Answer*  9. PREGNANCY: \"Is there any chance you are pregnant? \" \"When was your last menstrual period? \"      *No Answer*  10. OTHER SYMPTOMS: \"Do you have any other symptoms? \"  (e.g., chills, fatigue, headache, loss of smell or taste, muscle pain, sore throat)       Fatigue    Protocols used: CORONAVIRUS (COVID-19) DIAGNOSED OR SUSPECTED-ADULT-AH    Pt referred to flu clinic

## 2020-10-26 ENCOUNTER — HOSPITAL ENCOUNTER (EMERGENCY)
Age: 25
Discharge: HOME | End: 2020-10-26
Attending: EMERGENCY MEDICINE
Payer: MEDICAID

## 2020-10-26 ENCOUNTER — APPOINTMENT (OUTPATIENT)
Dept: GENERAL RADIOLOGY | Age: 25
End: 2020-10-26
Attending: EMERGENCY MEDICINE
Payer: MEDICAID

## 2020-10-26 VITALS
HEIGHT: 64 IN | SYSTOLIC BLOOD PRESSURE: 122 MMHG | WEIGHT: 160 LBS | RESPIRATION RATE: 18 BRPM | BODY MASS INDEX: 27.31 KG/M2 | DIASTOLIC BLOOD PRESSURE: 80 MMHG | TEMPERATURE: 98.1 F | HEART RATE: 71 BPM | OXYGEN SATURATION: 99 %

## 2020-10-26 DIAGNOSIS — R05.9 COUGH: ICD-10-CM

## 2020-10-26 DIAGNOSIS — J02.9 VIRAL PHARYNGITIS: ICD-10-CM

## 2020-10-26 DIAGNOSIS — U07.1 COVID-19: Primary | ICD-10-CM

## 2020-10-26 LAB
ALBUMIN SERPL-MCNC: 3.9 G/DL (ref 3.5–5)
ALBUMIN/GLOB SERPL: 1.2 {RATIO} (ref 1.1–2.2)
ALP SERPL-CCNC: 62 U/L (ref 45–117)
ALT SERPL-CCNC: 18 U/L (ref 12–78)
ANION GAP SERPL CALC-SCNC: 3 MMOL/L (ref 5–15)
APTT PPP: 28.1 SEC (ref 23–35.7)
AST SERPL W P-5'-P-CCNC: 12 U/L (ref 15–37)
ATRIAL RATE: 90 BPM
BASOPHILS # BLD: 0.1 K/UL (ref 0–0.1)
BASOPHILS NFR BLD: 0 % (ref 0–1)
BILIRUB SERPL-MCNC: 0.4 MG/DL (ref 0.2–1)
BNP SERPL-MCNC: 16 PG/ML
BUN SERPL-MCNC: 5 MG/DL (ref 6–20)
BUN/CREAT SERPL: 7 (ref 12–20)
CA-I BLD-MCNC: 9 MG/DL (ref 8.5–10.1)
CALCULATED P AXIS, ECG09: 50 DEGREES
CALCULATED R AXIS, ECG10: 47 DEGREES
CALCULATED T AXIS, ECG11: 22 DEGREES
CHLORIDE SERPL-SCNC: 108 MMOL/L (ref 97–108)
CO2 SERPL-SCNC: 27 MMOL/L (ref 21–32)
CREAT SERPL-MCNC: 0.76 MG/DL (ref 0.55–1.02)
D DIMER PPP FEU-MCNC: <0.27 UG/ML(FEU)
DIAGNOSIS, 93000: NORMAL
DIFFERENTIAL METHOD BLD: ABNORMAL
EOSINOPHIL # BLD: 0.3 K/UL (ref 0–0.4)
EOSINOPHIL NFR BLD: 3 % (ref 0–7)
ERYTHROCYTE [DISTWIDTH] IN BLOOD BY AUTOMATED COUNT: 14.3 % (ref 11.5–14.5)
GLOBULIN SER CALC-MCNC: 3.3 G/DL (ref 2–4)
GLUCOSE SERPL-MCNC: 97 MG/DL (ref 65–100)
HCT VFR BLD AUTO: 42.3 % (ref 35–47)
HGB BLD-MCNC: 14.1 G/DL (ref 11.5–16)
IMM GRANULOCYTES # BLD AUTO: 0.1 K/UL (ref 0–0.04)
IMM GRANULOCYTES NFR BLD AUTO: 0 % (ref 0–0.5)
INR PPP: 1.1 (ref 0.9–1.1)
LYMPHOCYTES # BLD: 1.9 K/UL (ref 0.8–3.5)
LYMPHOCYTES NFR BLD: 17 % (ref 12–49)
MCH RBC QN AUTO: 29.9 PG (ref 26–34)
MCHC RBC AUTO-ENTMCNC: 33.3 G/DL (ref 30–36.5)
MCV RBC AUTO: 89.8 FL (ref 80–99)
MONOCYTES # BLD: 0.8 K/UL (ref 0–1)
MONOCYTES NFR BLD: 7 % (ref 5–13)
NEUTS SEG # BLD: 8.3 K/UL (ref 1.8–8)
NEUTS SEG NFR BLD: 73 % (ref 32–75)
P-R INTERVAL, ECG05: 130 MS
PLATELET # BLD AUTO: 282 K/UL (ref 150–400)
PMV BLD AUTO: 12.1 FL (ref 8.9–12.9)
POTASSIUM SERPL-SCNC: 3.9 MMOL/L (ref 3.5–5.1)
PROT SERPL-MCNC: 7.2 G/DL (ref 6.4–8.2)
PROTHROMBIN TIME: 14.3 SEC (ref 11.9–14.7)
Q-T INTERVAL, ECG07: 360 MS
QRS DURATION, ECG06: 88 MS
QTC CALCULATION (BEZET), ECG08: 440 MS
RBC # BLD AUTO: 4.71 M/UL (ref 3.8–5.2)
SODIUM SERPL-SCNC: 138 MMOL/L (ref 136–145)
THERAPEUTIC RANGE,PTTT: NORMAL SEC (ref 68–109)
TROPONIN I SERPL-MCNC: <0.05 NG/ML
VENTRICULAR RATE, ECG03: 90 BPM
WBC # BLD AUTO: 11.4 K/UL (ref 3.6–11)

## 2020-10-26 PROCEDURE — 85610 PROTHROMBIN TIME: CPT

## 2020-10-26 PROCEDURE — 85025 COMPLETE CBC W/AUTO DIFF WBC: CPT

## 2020-10-26 PROCEDURE — 36415 COLL VENOUS BLD VENIPUNCTURE: CPT

## 2020-10-26 PROCEDURE — 85730 THROMBOPLASTIN TIME PARTIAL: CPT

## 2020-10-26 PROCEDURE — 93005 ELECTROCARDIOGRAM TRACING: CPT

## 2020-10-26 PROCEDURE — 96374 THER/PROPH/DIAG INJ IV PUSH: CPT

## 2020-10-26 PROCEDURE — 84484 ASSAY OF TROPONIN QUANT: CPT

## 2020-10-26 PROCEDURE — 99283 EMERGENCY DEPT VISIT LOW MDM: CPT

## 2020-10-26 PROCEDURE — 83880 ASSAY OF NATRIURETIC PEPTIDE: CPT

## 2020-10-26 PROCEDURE — 85379 FIBRIN DEGRADATION QUANT: CPT

## 2020-10-26 PROCEDURE — 74011250636 HC RX REV CODE- 250/636: Performed by: EMERGENCY MEDICINE

## 2020-10-26 PROCEDURE — 80053 COMPREHEN METABOLIC PANEL: CPT

## 2020-10-26 PROCEDURE — 71045 X-RAY EXAM CHEST 1 VIEW: CPT

## 2020-10-26 RX ORDER — AZITHROMYCIN 250 MG/1
TABLET, FILM COATED ORAL
Qty: 6 TAB | Refills: 0 | Status: SHIPPED | OUTPATIENT
Start: 2020-10-26 | End: 2021-02-16

## 2020-10-26 RX ORDER — DEXAMETHASONE SODIUM PHOSPHATE 4 MG/ML
10 INJECTION, SOLUTION INTRA-ARTICULAR; INTRALESIONAL; INTRAMUSCULAR; INTRAVENOUS; SOFT TISSUE ONCE
Status: COMPLETED | OUTPATIENT
Start: 2020-10-26 | End: 2020-10-26

## 2020-10-26 RX ADMIN — DEXAMETHASONE SODIUM PHOSPHATE 10 MG: 4 INJECTION, SOLUTION INTRAMUSCULAR; INTRAVENOUS at 16:21

## 2020-10-26 NOTE — ED TRIAGE NOTES
Reports dx with [id number] on Monday, initially was fine but has started with hoarse voice, sore throat, and is now becoming SOB especially when talking. Reports difficulty getting air out. No fevers for 4 days. Denies body aches/chills.

## 2020-10-26 NOTE — ED NOTES
PT diagnosed with covid on Monday. Stats increasingly worsening sore throat with SOB. Denies fevers. Hemodynamically stable. Labs sent prior to bed placement. IV in place.

## 2020-10-26 NOTE — ED PROVIDER NOTES
EMERGENCY DEPARTMENT HISTORY AND PHYSICAL EXAM      Date: 10/26/2020  Patient Name: Sandra Alegria    History of Presenting Illness     Chief Complaint   Patient presents with    Cough    Sore Throat    Shortness of Breath       History Provided By: Patient    HPI: Sandra Alegria, 21 y.o. female presents to the ED with cc of   Chief Complaint   Patient presents with    Cough    Sore Throat    Shortness of Breath   Reports dx with covid19 on Monday, initially was fine but has started with hoarse voice, sore throat, and is now becoming SOB especially when talking. Reports difficulty getting air out. No fevers for 4 days. Denies body aches/chills. There are no other complaints, changes, or physical findings at this time. PCP: Javid Rivera, DO    No current facility-administered medications on file prior to encounter. Current Outpatient Medications on File Prior to Encounter   Medication Sig Dispense Refill    clonazePAM (KLONOPIN) 1 mg tablet Take  by mouth two (2) times a day. 1mg at night , half extra tab during the day PRN      hyoscyamine SL (LEVSIN/SL) 0.125 mg SL tablet 2 Tabs by SubLINGual route every six (6) hours as needed for Cramping. 120 Tab 3    pantoprazole (PROTONIX) 40 mg tablet Take 1 Tab by mouth daily.  30 Tab 0    hydrOXYzine HCl (ATARAX) 50 mg tablet TAKE 1 TABLET BY MOUTH THREE TIMES A DAY AS NEEDED FOR ANXIETY      temazepam (RESTORIL) 15 mg capsule TAKE 1 CAPSULE BY MOUTH AT NIGHT      venlafaxine-SR (EFFEXOR-XR) 75 mg capsule TAKE 1 CAPSULE BY MOUTH EVERY DAY         Past History     Past Medical History:  Past Medical History:   Diagnosis Date    Atrial fibrillation ([facility] 11/30/2017    Borderline personality disorder ([facility]     vs. traits thereof    Chlamydia infection affecting pregnancy 12/12/2017    Chronic tonsillitis     s/p tonsillectomy 2011    Conduct disorder     History of inadequate prenatal care 3/13/2014    Migraine     Mood disorder ([facility] 5/19/2015    Oppositional defiant disorder     Substance abuse ([facility])     mj, alcohol, heroin       Past Surgical History:  Past Surgical History:   Procedure Laterality Date    HX OTHER SURGICAL      HX TONSILLECTOMY  2011       Family History:  Family History   Problem Relation Age of Onset    Anxiety Mother    [facility] Depression Mother     Alcohol abuse Mother         still using    Anxiety Father     Lupus Maternal Grandmother     Depression Sister     Depression Sister        Social History:  Social History     Tobacco Use    Smoking status: Former Smoker     Packs/day: 0.25     Last attempt to quit: 11/29/2016     Years since quitting: 3.9    Smokeless tobacco: Never Used    Tobacco comment: PATIENT VAPES   Substance Use Topics    Alcohol use: Not Currently    Drug use: Not Currently     Types: Marijuana       Allergies:  No Known Allergies      Review of Systems   Review of Systems   Constitutional: Negative. HENT: Positive for sore throat. Negative for congestion, facial swelling and rhinorrhea. Eyes: Negative. Negative for photophobia and pain. Respiratory: Positive for cough and choking. Negative for shortness of breath and wheezing. Cardiovascular: Negative. Negative for chest pain. Gastrointestinal: Negative for abdominal distention and abdominal pain. Genitourinary: Negative. Musculoskeletal: Negative. Allergic/Immunologic: Positive for immunocompromised state. Neurological: Negative. Negative for syncope and weakness. Hematological: Negative. Psychiatric/Behavioral: Negative. Physical Exam   Physical Exam  Vitals signs and nursing note reviewed. Constitutional:       Appearance: Normal appearance. She is not ill-appearing, toxic-appearing or diaphoretic. HENT:      Head: Normocephalic and atraumatic. Right Ear: Tympanic membrane and external ear normal.      Left Ear: Tympanic membrane and external ear normal.      Nose: Congestion present. Mouth/Throat:      Mouth: Mucous membranes are moist.      Pharynx: Oropharynx is clear. Posterior oropharyngeal erythema present. No oropharyngeal exudate or uvula swelling. Eyes:      Extraocular Movements: Extraocular movements intact. Pupils: Pupils are equal, round, and reactive to light. Cardiovascular:      Rate and Rhythm: Normal rate and regular rhythm. Pulmonary:      Effort: Pulmonary effort is normal.      Breath sounds: Wheezing (mild) present. Abdominal:      General: Abdomen is flat. There is no distension. Palpations: Abdomen is soft. Tenderness: There is no abdominal tenderness. Musculoskeletal: Normal range of motion. Skin:     General: Skin is warm and dry. Findings: No rash. Neurological:      General: No focal deficit present. Mental Status: She is alert and oriented to person, place, and time. Psychiatric:         Mood and Affect: Mood normal.         Diagnostic Study Results     Labs -     Recent Results (from the past 12 hour(s))   CBC WITH AUTOMATED DIFF    Collection Time: 10/26/20  2:00 PM   Result Value Ref Range    WBC 11.4 (H) 3.6 - 11.0 K/uL    RBC 4.71 3.80 - 5.20 M/uL    HGB 14.1 11.5 - 16.0 g/dL    HCT 42.3 35.0 - 47.0 %    MCV 89.8 80.0 - 99.0 FL    MCH 29.9 26.0 - 34.0 PG    MCHC 33.3 30.0 - 36.5 g/dL    RDW 14.3 11.5 - 14.5 %    PLATELET 254 112 - 098 K/uL    MPV 12.1 8.9 - 12.9 FL    NEUTROPHILS 73 32 - 75 %    LYMPHOCYTES 17 12 - 49 %    MONOCYTES 7 5 - 13 %    EOSINOPHILS 3 0 - 7 %    BASOPHILS 0 0 - 1 %    IMMATURE GRANULOCYTES 0 0.0 - 0.5 %    ABS. NEUTROPHILS 8.3 (H) 1.8 - 8.0 K/UL    ABS. LYMPHOCYTES 1.9 0.8 - 3.5 K/UL    ABS. MONOCYTES 0.8 0.0 - 1.0 K/UL    ABS. EOSINOPHILS 0.3 0.0 - 0.4 K/UL    ABS. BASOPHILS 0.1 0.0 - 0.1 K/UL    ABS. IMM.  GRANS. 0.1 (H) 0.00 - 0.04 K/UL    DF AUTOMATED     METABOLIC PANEL, COMPREHENSIVE    Collection Time: 10/26/20  2:00 PM   Result Value Ref Range    Sodium 138 136 - 145 mmol/L Potassium 3.9 3.5 - 5.1 mmol/L    Chloride 108 97 - 108 mmol/L    CO2 27 21 - 32 mmol/L    Anion gap 3 (L) 5 - 15 mmol/L    Glucose 97 65 - 100 mg/dL    BUN 5 (L) 6 - 20 mg/dL    Creatinine 0.76 0.55 - 1.02 mg/dL    BUN/Creatinine ratio 7 (L) 12 - 20      GFR est AA >60 >60 ml/min/1.73m2    GFR est non-AA >60 >60 ml/min/1.73m2    Calcium 9.0 8.5 - 10.1 mg/dL    Bilirubin, total 0.4 0.2 - 1.0 mg/dL    AST (SGOT) 12 (L) 15 - 37 U/L    ALT (SGPT) 18 12 - 78 U/L    Alk. phosphatase 62 45 - 117 U/L    Protein, total 7.2 6.4 - 8.2 g/dL    Albumin 3.9 3.5 - 5.0 g/dL    Globulin 3.3 2.0 - 4.0 g/dL    A-G Ratio 1.2 1.1 - 2.2     TROPONIN I    Collection Time: 10/26/20  2:00 PM   Result Value Ref Range    Troponin-I, Qt. <0.05 <0.05 ng/mL   BNP    Collection Time: 10/26/20  2:00 PM   Result Value Ref Range    NT pro-BNP 16 <125 pg/mL   PROTHROMBIN TIME + INR    Collection Time: 10/26/20  2:00 PM   Result Value Ref Range    Prothrombin time 14.3 11.9 - 14.7 sec    INR 1.1 0.9 - 1.1     PTT    Collection Time: 10/26/20  2:00 PM   Result Value Ref Range    aPTT 28.1 23.0 - 35.7 sec    aPTT, therapeutic range   68 - 109 sec   D DIMER    Collection Time: 10/26/20  2:00 PM   Result Value Ref Range    D DIMER <0.27 <0.50 ug/ml(FEU)       Labs reviewed by me    Radiologic Studies -   XR CHEST PORT   Final Result   Impression: No acute pulmonary process. CT Results  (Last 48 hours)    None        CXR Results  (Last 48 hours)               10/26/20 1439  XR CHEST PORT Final result    Impression:  Impression: No acute pulmonary process. Narrative:  Chest, frontal view, 10/26/2020       History: Shortness of breath. Covid-19 positive. Comparison: Including chest 2/2/2020. Findings: The cardiac silhouette is within normal limits. The lungs are   adequately expanded. No hydrostatic edema is present. No focal consolidation,   pleural effusions or pneumothorax is identified.    No acute osseous findings are   definitively seen. Medical Decision Making     I am the first provider for this patient. I reviewed the vital signs, available nursing notes, past medical history, past surgical history, family history and social history. RADIOLOGY report and LABS reviewed by me    Vital Signs-Reviewed the patient's vital signs. Patient Vitals for the past 12 hrs:   Temp Pulse Resp BP SpO2   10/26/20 1541     99 %   10/26/20 1353 98.3 °F (36.8 °C) 74 18 118/86 99 %       EKG interpretation: (Preliminary)      Records Reviewed: Nurse's note. Provider Notes (Medical Decision Making):    Patient presents with        ED Course:   Initial assessment performed. The patients presenting problems have been discussed, and they are in agreement with the care plan formulated and outlined with them. I have encouraged them to ask questions as they arise throughout their visit. TREATMENT RESPONSE -Stable          Fernando Jaramillo MD      Disposition:  Discharged   Diagnostic tests were reviewed and questions answered. Diagnosis, care plan and treatment options were discussed. The patient understand instructions and will follow up as directed. Condition stable    Admitting Provider:  No admitting provider for patient encounter. Consulting Provider:  No ref. provider found       DISCHARGE PLAN:  1. Current Discharge Medication List      START taking these medications    Details   azithromycin (Zithromax Z-Dony) 250 mg tablet Take as directed on the package  Qty: 6 Tab, Refills: 0           2. Follow-up Information    None       3. Return to ED if worse     Diagnosis     Clinical Impression:     ICD-10-CM ICD-9-CM    1. COVID-19  U07.1 079.89    2. Cough  R05 786.2    3. Viral pharyngitis  J02.9 462         Attestations:    Fernando Jaramillo MD    Please note that this dictation was completed with [employer] the [employer] voice recognition software.   Quite often unanticipated grammatical, syntax, homophones, and other interpretive errors are inadvertently transcribed by the computer software. Please disregard these errors. Please excuse any errors that have escaped final proofreading. Thank you.

## 2020-11-22 NOTE — PROGRESS NOTES
Chlamydia positive. The pt is pregnant. Will treat with Azithromycin 1g. Will need BHAVIK in 4 weeks. Will call the pt. alert and awake

## 2021-01-19 ENCOUNTER — TELEPHONE (OUTPATIENT)
Dept: FAMILY MEDICINE CLINIC | Age: 26
End: 2021-01-19

## 2021-01-19 NOTE — TELEPHONE ENCOUNTER
----- Message from Scooby Vitale sent at 1/19/2021 10:04 AM EST -----  Regarding: Dr. Roberth Torres telephone  Level 1/Escalated Issue      Caller's first and last name and relationship (if not the patient): pt       Best contact number(s): [phone]      What are the symptoms: vomiting and sweating (pt last period was 12/1 pt states she recently took a pregnancy test recently and it was negative)        Transfer successful - yes/no (include outcome): no       Transfer declined - yes/no (include reason): no       Was caller advised to seek appropriate level of care - yes/no: yes       Details to clarify the request: mal Vitale

## 2021-01-20 ENCOUNTER — TRANSCRIBE ORDER (OUTPATIENT)
Dept: FAMILY MEDICINE CLINIC | Age: 26
End: 2021-01-20

## 2021-01-20 ENCOUNTER — VIRTUAL VISIT (OUTPATIENT)
Dept: FAMILY MEDICINE CLINIC | Age: 26
End: 2021-01-20
Payer: MEDICAID

## 2021-01-20 ENCOUNTER — TELEPHONE (OUTPATIENT)
Dept: FAMILY MEDICINE CLINIC | Age: 26
End: 2021-01-20

## 2021-01-20 DIAGNOSIS — M25.561 CHRONIC PAIN OF BOTH KNEES: ICD-10-CM

## 2021-01-20 DIAGNOSIS — F12.90 MARIJUANA USER: ICD-10-CM

## 2021-01-20 DIAGNOSIS — M25.542 JOINT PAIN IN BOTH HANDS: ICD-10-CM

## 2021-01-20 DIAGNOSIS — M25.562 CHRONIC PAIN OF BOTH KNEES: ICD-10-CM

## 2021-01-20 DIAGNOSIS — M25.541 JOINT PAIN IN BOTH HANDS: ICD-10-CM

## 2021-01-20 DIAGNOSIS — L74.511 FACIAL SWEATING: ICD-10-CM

## 2021-01-20 DIAGNOSIS — G89.29 CHRONIC PAIN OF BOTH KNEES: ICD-10-CM

## 2021-01-20 DIAGNOSIS — R53.82 CHRONIC FATIGUE: ICD-10-CM

## 2021-01-20 DIAGNOSIS — R21 RASH OF FACE: Primary | ICD-10-CM

## 2021-01-20 DIAGNOSIS — R11.2 VOMITING WITH NAUSEA, NOT INTRACTABLE: ICD-10-CM

## 2021-01-20 PROCEDURE — 81025 URINE PREGNANCY TEST: CPT | Performed by: FAMILY MEDICINE

## 2021-01-20 PROCEDURE — 99214 OFFICE O/P EST MOD 30 MIN: CPT | Performed by: FAMILY MEDICINE

## 2021-01-20 RX ORDER — ONDANSETRON 4 MG/1
4 TABLET, ORALLY DISINTEGRATING ORAL
Qty: 30 TAB | Refills: 1 | Status: SHIPPED | OUTPATIENT
Start: 2021-01-20

## 2021-01-20 NOTE — TELEPHONE ENCOUNTER
----- Message from Esme Arellano MD sent at 1/20/2021 11:50 AM EST -----  Regarding: LAB appointment needed  Hello,   Please call the patient and schedule LAB appointment.   Thank you,  Esme Arellano MD

## 2021-01-20 NOTE — TELEPHONE ENCOUNTER
Patient scheduled for:  Appointment Information   Name: Roland Stinson MRN: [id number]    Date: 1/20/2021 Status: Arrived    Time: 11:30 AM Length: 27 [phone]   Visit Type: VV SPECIAL USE CASE [[id number]] Copay: $0.00    Provider: Ben Bhat MD Department: Formerly named [facility]    Referral #:   Referral Status:      Referring Provider: Janna Haney Patient Type:      Enc Form Number: [id number]        Notes: vomiting, sweating (pt last period was 12/1(negative pregnancy test) [facility] 1/19/21      Close enc
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
## 2021-01-20 NOTE — TELEPHONE ENCOUNTER
Called and scheduled pt for her lab work:    Appointment Information   Name: Roland Stinson MRN: [id number]    Date: 1/22/2021 Status: [facility]    Time: 9:30 AM Length: 15 [phone]   Visit Type: LAB [[id number]] Copay: $0.00    Provider: LAB [facility] Department: [facility]    Referral #:   Referral Status:      Referring Provider:   Patient Type:      Notes: LABS      Closed enc

## 2021-01-22 ENCOUNTER — LAB ONLY (OUTPATIENT)
Dept: FAMILY MEDICINE CLINIC | Age: 26
End: 2021-01-22

## 2021-01-22 DIAGNOSIS — M25.541 JOINT PAIN IN BOTH HANDS: ICD-10-CM

## 2021-01-22 DIAGNOSIS — R53.82 CHRONIC FATIGUE: ICD-10-CM

## 2021-01-22 DIAGNOSIS — L74.511 FACIAL SWEATING: ICD-10-CM

## 2021-01-22 DIAGNOSIS — R21 RASH OF FACE: ICD-10-CM

## 2021-01-22 DIAGNOSIS — M25.542 JOINT PAIN IN BOTH HANDS: ICD-10-CM

## 2021-01-22 LAB
HCG URINE, QL. (POC): NEGATIVE
VALID INTERNAL CONTROL?: YES

## 2021-01-23 LAB
C TRACH RRNA SPEC QL NAA+PROBE: NEGATIVE
N GONORRHOEA RRNA SPEC QL NAA+PROBE: NEGATIVE

## 2021-01-25 LAB
ANA SER QL: NEGATIVE
B BURGDOR IGM SER IA-ACNC: <0.8 INDEX (ref 0–0.79)
CRP SERPL HS-MCNC: 7.05 MG/L (ref 0–3)
DSDNA AB SER-ACNC: 2 IU/ML (ref 0–9)
ENA RNP AB SER-ACNC: <0.2 AI (ref 0–0.9)
ENA SM AB SER-ACNC: <0.2 AI (ref 0–0.9)
ERYTHROCYTE [DISTWIDTH] IN BLOOD BY AUTOMATED COUNT: 12.9 % (ref 11.7–15.4)
ERYTHROCYTE [SEDIMENTATION RATE] IN BLOOD BY WESTERGREN METHOD: 8 MM/HR (ref 0–32)
HCT VFR BLD AUTO: 41.7 % (ref 34–46.6)
HGB BLD-MCNC: 14.3 G/DL (ref 11.1–15.9)
MCH RBC QN AUTO: 30 PG (ref 26.6–33)
MCHC RBC AUTO-ENTMCNC: 34.3 G/DL (ref 31.5–35.7)
MCV RBC AUTO: 88 FL (ref 79–97)
PLATELET # BLD AUTO: 266 X10E3/UL (ref 150–450)
RBC # BLD AUTO: 4.76 X10E6/UL (ref 3.77–5.28)
RHEUMATOID FACT SERPL-ACNC: <10 IU/ML (ref 0–13.9)
TSH SERPL DL<=0.005 MIU/L-ACNC: 0.31 UIU/ML (ref 0.45–4.5)
VIT B12 SERPL-MCNC: 456 PG/ML (ref 232–1245)
WBC # BLD AUTO: 10 X10E3/UL (ref 3.4–10.8)

## 2021-01-27 ENCOUNTER — TELEPHONE (OUTPATIENT)
Dept: FAMILY MEDICINE CLINIC | Age: 26
End: 2021-01-27

## 2021-01-27 DIAGNOSIS — R79.89 ABNORMAL TSH: Primary | ICD-10-CM

## 2021-01-27 NOTE — PROGRESS NOTES
CBC WNL  RA negative  Lupus profile negative  Lyme disease IgM negative. Vit B12 normal.   Gc/Chlamydia negative. Vit B 12 normal   ESR WNL  CRP elevated. TSH is low, will repeat together with Free T3 and T4. Will call the patient.

## 2021-01-27 NOTE — TELEPHONE ENCOUNTER
I called the patient at [phone] and discussed the results. Will repeat TSH together with Free T3 and T4.    The patient will schedule appointment with Rheumatologist.   4:55 PM  1/27/2021  Carmen Blackman MD

## 2021-01-28 ENCOUNTER — TELEPHONE (OUTPATIENT)
Dept: FAMILY MEDICINE CLINIC | Age: 26
End: 2021-01-28

## 2021-01-28 NOTE — TELEPHONE ENCOUNTER
----- Message from Vicki Abrams MD sent at 1/27/2021  5:12 PM EST -----  Hel,   Please call the patient and schedule LAB appointment. The patient is aware.   Thank you,  Vicki Abrams MD

## 2021-02-05 ENCOUNTER — LAB ONLY (OUTPATIENT)
Dept: FAMILY MEDICINE CLINIC | Age: 26
End: 2021-02-05

## 2021-02-05 DIAGNOSIS — R79.89 ABNORMAL TSH: ICD-10-CM

## 2021-02-06 LAB
T3FREE SERPL-MCNC: 4 PG/ML (ref 2–4.4)
T4 FREE SERPL-MCNC: 1.19 NG/DL (ref 0.82–1.77)
TSH SERPL DL<=0.005 MIU/L-ACNC: 0.57 UIU/ML (ref 0.45–4.5)

## 2021-02-11 ENCOUNTER — TELEPHONE (OUTPATIENT)
Dept: FAMILY MEDICINE CLINIC | Age: 26
End: 2021-02-11

## 2021-02-11 DIAGNOSIS — E05.90 SUBCLINICAL HYPERTHYROIDISM: Primary | ICD-10-CM

## 2021-02-11 NOTE — TELEPHONE ENCOUNTER
I called the patient at  to discuss the test results. The patient was identified by 2 identifiers. Discussed results. Awaiting recs from rheumatologist for further recommendations. Please disregard the last message about the Thyroid studies. TSH/FreeT3/T4 came back normal, no additional testing is needed.      2:25 PM  2/11/2021  Cheryl Kay MD

## 2021-02-16 ENCOUNTER — VIRTUAL VISIT (OUTPATIENT)
Dept: FAMILY MEDICINE CLINIC | Age: 26
End: 2021-02-16
Payer: MEDICAID

## 2021-02-16 DIAGNOSIS — R11.2 VOMITING WITH NAUSEA, NOT INTRACTABLE: Primary | ICD-10-CM

## 2021-02-16 DIAGNOSIS — F12.10 MARIJUANA ABUSE: ICD-10-CM

## 2021-02-16 DIAGNOSIS — K21.9 GASTROESOPHAGEAL REFLUX DISEASE WITHOUT ESOPHAGITIS: ICD-10-CM

## 2021-02-16 DIAGNOSIS — R63.4 WEIGHT LOSS: ICD-10-CM

## 2021-02-16 PROBLEM — A74.9 CHLAMYDIA INFECTION AFFECTING PREGNANCY: Status: RESOLVED | Noted: 2017-12-12 | Resolved: 2021-02-16

## 2021-02-16 PROBLEM — O99.891 BACTERIURIA DURING PREGNANCY: Status: RESOLVED | Noted: 2017-12-08 | Resolved: 2021-02-16

## 2021-02-16 PROBLEM — O98.819 CHLAMYDIA INFECTION AFFECTING PREGNANCY: Status: RESOLVED | Noted: 2017-12-12 | Resolved: 2021-02-16

## 2021-02-16 PROBLEM — Z67.91 RH NEGATIVE STATE IN ANTEPARTUM PERIOD: Status: RESOLVED | Noted: 2018-03-29 | Resolved: 2021-02-16

## 2021-02-16 PROBLEM — R82.71 BACTERIURIA DURING PREGNANCY: Status: RESOLVED | Noted: 2017-12-08 | Resolved: 2021-02-16

## 2021-02-16 PROBLEM — O26.899 RH NEGATIVE STATE IN ANTEPARTUM PERIOD: Status: RESOLVED | Noted: 2018-03-29 | Resolved: 2021-02-16

## 2021-02-16 PROCEDURE — 99214 OFFICE O/P EST MOD 30 MIN: CPT | Performed by: STUDENT IN AN ORGANIZED HEALTH CARE EDUCATION/TRAINING PROGRAM

## 2021-02-16 RX ORDER — PANTOPRAZOLE SODIUM 40 MG/1
40 TABLET, DELAYED RELEASE ORAL DAILY
Qty: 30 TAB | Refills: 1 | Status: SHIPPED | OUTPATIENT
Start: 2021-02-16

## 2021-02-16 NOTE — PROGRESS NOTES
[address] Medicine Residency Attending Addendum:  Dr. Glendy Knott, DO,  the patient and I were not physically present during this encounter. The resident and I are concurrently monitoring the patient care through appropriate telecommunication technology. I discussed the findings, assessment and plan with the resident and agree with the resident's findings and plan as documented in the resident's note.       Madelyn Snider MD

## 2021-02-16 NOTE — PROGRESS NOTES
Lito Scales  31 y.o. female  1995  [address] [address]  [address]:    Telemedicine Progress Note  Ashley Pop       Encounter Date and Time: February 16, 2021 at 8:03 AM    Consent: Lito Scales, who was seen by synchronous (real-time) audio-video technology, and/or her healthcare decision maker, is aware that this patient-initiated, Telehealth encounter on 2/16/2021 is a billable service, with coverage as determined by her insurance carrier. She is aware that she may receive a bill and has provided verbal consent to proceed: Yes. Chief Complaint   Patient presents with    Nausea    GERD     History of Present Illness   Lito Scales is a 31 y.o. female was evaluated by synchronous (real-time) audio-video technology from Pt home, through a secure patient portal.      Nausea/Vomiting     Lito Scales is a 31 y.o. female with past h/o IV drug abuse (several years ago), chronic daily marijuana use, former alcohol use, 1.3x1.8cm liver mass likley hemangioma (followed by GI, Dr. Anum Valenzuela) depression, and GERD presents with several weeks of nausea, worse in the AM and after eating certain foods. Since 2/12/21 the symptoms have seemed worse and she has been spitting up frothy, clear vomit 1-2x.day. States when these symptoms initially onset it felt more like heartburn or GERD. Denies hematemesis, fever, chills, abdominal pain. No sick contacts at home. Denies h/o Gastric ulcer. States she has lost 10 lb in the past 1.5 months since cutting out caffiene. Smokes Marijuana multiple times per day- states the other day when she didn't smoke as much she felt slightly better. Has not consumed EtOH in several weeks and states she only drinks once a month if that. LMP-does not remember. She is sexually active with her  and does not use protect or OCP.       Also endorses having intermittent stool with mucus in it and occasionally green in color. Had a normal BM today. Stools are formed and denies any diarrhea. She was taking Protonix but has not been on them in several months. Of note:  She follows with GI, Dr. Shiela Suarez, but has not seen them for this problem  Last office with was 1/24/2020 where she was told she had a liver hemangioma and negative workup for HBV, HCV,  and CEA. She is supposed to follow up with liver MRI. COVID screening: NEGATIVE. Pt denies sick contacts or recent travel. Denies recent illness, fever, chills, sore throat, vision changes, HA, dizziness, cough , SOB, chest pain, dysuria, n/v/d, abdominal pain or extremity edema. Review of Systems   Review of Systems   Constitutional: Positive for weight loss. Negative for chills and fever. HENT: Negative for congestion and sinus pain. Eyes: Negative for double vision. Respiratory: Negative for cough and shortness of breath. Cardiovascular: Negative for chest pain and palpitations. Gastrointestinal: Positive for nausea and vomiting. Negative for abdominal pain, blood in stool, constipation, diarrhea and melena. Genitourinary: Negative for dysuria and hematuria. Musculoskeletal: Negative for myalgias. Skin: Negative for rash. Neurological: Negative for dizziness and headaches. Vitals/Objective:     General: alert, cooperative, no distress   Mental  status: mental status: alert, oriented to person, place, and time, normal mood, behavior, speech, dress, motor activity, and thought processes   Resp: resp: normal effort and no respiratory distress   Neuro: neuro: no gross deficits   Skin: skin: no discoloration or lesions of concern on visible areas   Due to this being a TeleHealth evaluation, many elements of the physical examination are unable to be assessed.       Assessment and Plan:   Time-based coding, delete if not needed: I spent at least 25 minutes with this established patient, and >50% of the time was spent counseling and/or coordinating care regarding n/v, weight loss     1. Vomiting with nausea, not intractable  Chronic issue that is gradually worsening. NBNB vomiting. Worse with certain foods and in the AM.  Off protonix for several months. Last f/u with GI was 1/24/20 (Dr. Criss Gipson)- noted to have hepatic hemangioma which could be causing some of her issues. Supposed to follow up with MRI. Had neg HBV, HCV and cancer markers. Unsure of LMP. Recent labs from 1/22 with normal TSH, B12, CBC, ESR, RF.  CRP slightly elevated but no overt signs of infection or inflammation. DDx: cyclic vomiting syndrome 2/2 daily chronic marijuana use, vs GERD, vs pregnancy vs known hepatic hemangioma. - Restart pantoprazole (PROTONIX) 40 mg tablet; Take 1 Tab by mouth daily. Dispense: 30 Tab; Refill: 1  - HEMOGLOBIN A1C WITH EAG; Future- Pt requested and is concerned for DM   - METABOLIC PANEL, COMPREHENSIVE; Future  - AMB POC URINE PREGNANCY TEST, VISUAL COLOR COMPARISON  - Follow up with GI     2. Gastroesophageal reflux disease without esophagitis  - Restart pantoprazole (PROTONIX) 40 mg tablet; Take 1 Tab by mouth daily. Dispense: 30 Tab; Refill: 1  - if no relief in 10-14 days then stop    3. Weight loss  10lb in 1.5 months. Could be due to underlying liver etiology and changes in diet 2/2 n/v.    - HEMOGLOBIN A1C WITH EAG; Future  - METABOLIC PANEL, COMPREHENSIVE; Future    4. Chronic Marijuana use   Counseled on signs and symptoms of cyclic vomiting syndrome and avoidance of marijuana use as this will only exacerbate her sx's. Time spent in direct conversation with the patient to include medical condition(s) discussed, assessment and treatment plan:       We discussed the expected course, resolution and complications of the diagnosis(es) in detail. Medication risks, benefits, costs, interactions, and alternatives were discussed as indicated.   I advised her to contact the office if her condition worsens, changes or fails to improve as anticipated. She expressed understanding with the diagnosis(es) and plan. Patient understands that this encounter was a temporary measure, and the importance of further follow up and examination was emphasized. Patient verbalized understanding. Patient informed to follow up: Shandra Márquez Follow-up and Dispositions  ·   Return in about 2 weeks (around 3/2/2021) for follow up of n/v.   Routing History          Electronically Signed: DO Funmilayo Child is a 31 y.o. female who was evaluated by an audio-video encounter for concerns as above. Patient identification was verified prior to start of the visit. A caregiver was present when appropriate. Due to this being a TeleHealth encounter (During [facility] public health emergency), evaluation of the following organ systems was limited: Vitals/Constitutional/EENT/Resp/CV/GI//MS/Neuro/Skin/Heme-Lymph-Imm. Pursuant to the emergency declaration under the [facility], [facility]5 waiver authority and the [employer] Act, this Virtual Visit was conducted, with patient's (and/or legal guardian's) consent, to reduce the patient's risk of exposure to COVID-19 and provide necessary medical care. Services were provided through a synchronous discussion virtually to substitute for in-person clinic visit. I was at home. The patient was at home. History   Patients past medical, surgical and family histories were reviewed and updated.       Past Medical History:   Diagnosis Date    Atrial fibrillation ([facility] 11/30/2017    Borderline personality disorder ([facility]     vs. traits thereof    Chlamydia infection affecting pregnancy 12/12/2017    Chronic tonsillitis     s/p tonsillectomy 2011    Conduct disorder     History of inadequate prenatal care 3/13/2014    Migraine     Mood disorder ([address] 5/19/2015    Oppositional defiant disorder  Substance abuse (HCC)     mj, alcohol, heroin     Past Surgical History:   Procedure Laterality Date    HX OTHER SURGICAL      HX TONSILLECTOMY  2011     Family History   Problem Relation Age of Onset    Anxiety Mother     Depression Mother     Alcohol abuse Mother         still using    Anxiety Father     Lupus Maternal Grandmother     Depression Sister     Depression Sister      Social History     Tobacco Use    Smoking status: Former Smoker     Packs/day: 0.25     Quit date: 2016     Years since quittin.2    Smokeless tobacco: Never Used    Tobacco comment: PATIENT VAPES   Substance Use Topics    Alcohol use: Yes     Alcohol/week: 1.0 standard drinks     Types: 1 Glasses of wine per week     Frequency: Monthly or less     Drinks per session: 1 or 2    Drug use: Not Currently     Types: Marijuana     Patient Active Problem List   Diagnosis Code    Mood disorder ([facility] F39    Migraine G43.909    Skin change R23.9    Overdose T50.901A    Depression with suicidal ideation F32.9, R45.851    Polysubstance dependence ([facility] F19.20    PTSD (post-traumatic stress disorder) F43.10    Alcohol intoxication ([facility] F10.929    Borderline personality disorder ([facility] F60.3    Antisocial personality disorder ([facility] F60.2    Oppositional defiant disorder F91.3    Conduct disorder F91.9    Tachycardia R00.0    Bacteriuria during pregnancy O99.891, R82.71    Chlamydia infection affecting pregnancy O98.819, A74.9    Rh negative state in antepartum period O26.899, Z67.91    Abdominal pain R10.9          Current Medications/Allergies   Medications and Allergies reviewed:    Current Outpatient Medications   Medication Sig Dispense Refill    pantoprazole (PROTONIX) 40 mg tablet Take 1 Tab by mouth daily. 30 Tab 1    ondansetron (ZOFRAN ODT) 4 mg disintegrating tablet Take 1 Tab by mouth every eight (8) hours as needed for Nausea or Vomiting.  30 Tab 1     No Known Allergies

## 2022-03-19 PROBLEM — R00.0 TACHYCARDIA: Status: ACTIVE | Noted: 2017-11-30

## 2022-03-19 PROBLEM — R10.9 ABDOMINAL PAIN: Status: ACTIVE | Noted: 2020-01-09

## 2023-05-10 RX ORDER — PANTOPRAZOLE SODIUM 40 MG/1
TABLET, DELAYED RELEASE ORAL DAILY
COMMUNITY
Start: 2021-02-16

## 2023-05-10 RX ORDER — ONDANSETRON 4 MG/1
TABLET, ORALLY DISINTEGRATING ORAL EVERY 8 HOURS PRN
COMMUNITY
Start: 2021-01-20